# Patient Record
Sex: MALE | Race: WHITE | Employment: OTHER | URBAN - METROPOLITAN AREA
[De-identification: names, ages, dates, MRNs, and addresses within clinical notes are randomized per-mention and may not be internally consistent; named-entity substitution may affect disease eponyms.]

---

## 2017-01-10 ENCOUNTER — TELEPHONE (OUTPATIENT)
Dept: FAMILY MEDICINE CLINIC | Age: 52
End: 2017-01-10

## 2017-01-19 ENCOUNTER — OFFICE VISIT (OUTPATIENT)
Dept: FAMILY MEDICINE CLINIC | Age: 52
End: 2017-01-19

## 2017-01-19 VITALS
DIASTOLIC BLOOD PRESSURE: 66 MMHG | WEIGHT: 290 LBS | SYSTOLIC BLOOD PRESSURE: 118 MMHG | HEART RATE: 104 BPM | BODY MASS INDEX: 40.45 KG/M2

## 2017-01-19 DIAGNOSIS — N13.5 IDIOPATHIC RETROPERITONEAL FIBROSIS: Primary | ICD-10-CM

## 2017-01-19 DIAGNOSIS — F41.9 ANXIETY: ICD-10-CM

## 2017-01-19 DIAGNOSIS — R10.32 LEFT GROIN PAIN: ICD-10-CM

## 2017-01-19 DIAGNOSIS — H92.02 OTALGIA, LEFT: ICD-10-CM

## 2017-01-19 DIAGNOSIS — Z23 NEEDS FLU SHOT: ICD-10-CM

## 2017-01-19 PROCEDURE — G0008 ADMIN INFLUENZA VIRUS VAC: HCPCS | Performed by: FAMILY MEDICINE

## 2017-01-19 PROCEDURE — 99213 OFFICE O/P EST LOW 20 MIN: CPT | Performed by: FAMILY MEDICINE

## 2017-01-19 PROCEDURE — 90686 IIV4 VACC NO PRSV 0.5 ML IM: CPT | Performed by: FAMILY MEDICINE

## 2017-01-19 RX ORDER — OXYCODONE HCL 20 MG/1
40 TABLET, FILM COATED, EXTENDED RELEASE ORAL EVERY 12 HOURS
Qty: 120 TABLET | Refills: 0 | Status: SHIPPED | OUTPATIENT
Start: 2017-01-19 | End: 2017-02-17 | Stop reason: SDUPTHER

## 2017-01-19 RX ORDER — CLONAZEPAM 2 MG/1
1-2 TABLET ORAL 2 TIMES DAILY PRN
Qty: 60 TABLET | Refills: 0 | Status: SHIPPED | OUTPATIENT
Start: 2017-01-19 | End: 2017-02-17 | Stop reason: SDUPTHER

## 2017-01-19 RX ORDER — TRAMADOL HYDROCHLORIDE 50 MG/1
100 TABLET ORAL EVERY 6 HOURS PRN
Qty: 240 TABLET | Refills: 0 | Status: SHIPPED | OUTPATIENT
Start: 2017-01-19 | End: 2017-02-17 | Stop reason: SDUPTHER

## 2017-01-19 RX ORDER — OXYCODONE AND ACETAMINOPHEN 10; 325 MG/1; MG/1
1 TABLET ORAL EVERY 4 HOURS PRN
Qty: 30 TABLET | Refills: 0 | Status: SHIPPED | OUTPATIENT
Start: 2017-01-19 | End: 2017-04-17 | Stop reason: SDUPTHER

## 2017-01-19 RX ORDER — OXYCODONE HCL 40 MG/1
40 TABLET, FILM COATED, EXTENDED RELEASE ORAL EVERY 12 HOURS
Status: CANCELLED | OUTPATIENT
Start: 2017-01-19

## 2017-01-19 RX ORDER — OXYCODONE HCL 40 MG/1
40 TABLET, FILM COATED, EXTENDED RELEASE ORAL EVERY 12 HOURS
COMMUNITY
End: 2017-03-17 | Stop reason: DRUGHIGH

## 2017-01-19 RX ORDER — AMOXICILLIN 500 MG/1
500 CAPSULE ORAL 3 TIMES DAILY
Qty: 30 CAPSULE | Refills: 0 | Status: SHIPPED | OUTPATIENT
Start: 2017-01-19 | End: 2017-03-17 | Stop reason: SDUPTHER

## 2017-01-20 ENCOUNTER — TELEPHONE (OUTPATIENT)
Dept: FAMILY MEDICINE CLINIC | Age: 52
End: 2017-01-20

## 2017-01-20 ASSESSMENT — ENCOUNTER SYMPTOMS
ABDOMINAL PAIN: 1
CONSTIPATION: 1
SHORTNESS OF BREATH: 0

## 2017-02-17 ENCOUNTER — OFFICE VISIT (OUTPATIENT)
Dept: FAMILY MEDICINE CLINIC | Age: 52
End: 2017-02-17

## 2017-02-17 VITALS
BODY MASS INDEX: 40.6 KG/M2 | HEIGHT: 71 IN | WEIGHT: 290 LBS | SYSTOLIC BLOOD PRESSURE: 130 MMHG | DIASTOLIC BLOOD PRESSURE: 74 MMHG

## 2017-02-17 DIAGNOSIS — R10.32 LEFT GROIN PAIN: ICD-10-CM

## 2017-02-17 DIAGNOSIS — F41.9 ANXIETY: ICD-10-CM

## 2017-02-17 DIAGNOSIS — N13.5 IDIOPATHIC RETROPERITONEAL FIBROSIS: ICD-10-CM

## 2017-02-17 PROCEDURE — 99213 OFFICE O/P EST LOW 20 MIN: CPT | Performed by: FAMILY MEDICINE

## 2017-02-17 RX ORDER — CLONAZEPAM 2 MG/1
1-2 TABLET ORAL 2 TIMES DAILY PRN
Qty: 60 TABLET | Refills: 0 | Status: SHIPPED | OUTPATIENT
Start: 2017-02-17 | End: 2017-03-17 | Stop reason: SDUPTHER

## 2017-02-17 RX ORDER — TRAMADOL HYDROCHLORIDE 50 MG/1
100 TABLET ORAL EVERY 6 HOURS PRN
Qty: 240 TABLET | Refills: 0 | Status: SHIPPED | OUTPATIENT
Start: 2017-02-17 | End: 2017-03-17 | Stop reason: SDUPTHER

## 2017-02-17 RX ORDER — OXYCODONE HCL 20 MG/1
40 TABLET, FILM COATED, EXTENDED RELEASE ORAL EVERY 12 HOURS
Qty: 120 TABLET | Refills: 0 | Status: SHIPPED | OUTPATIENT
Start: 2017-02-17 | End: 2017-03-17 | Stop reason: SDUPTHER

## 2017-02-19 ASSESSMENT — ENCOUNTER SYMPTOMS
VOMITING: 0
CONSTIPATION: 1
ABDOMINAL PAIN: 1
SHORTNESS OF BREATH: 0

## 2017-03-17 ENCOUNTER — OFFICE VISIT (OUTPATIENT)
Dept: FAMILY MEDICINE CLINIC | Age: 52
End: 2017-03-17

## 2017-03-17 VITALS
SYSTOLIC BLOOD PRESSURE: 120 MMHG | DIASTOLIC BLOOD PRESSURE: 82 MMHG | WEIGHT: 277 LBS | HEART RATE: 92 BPM | BODY MASS INDEX: 38.63 KG/M2 | TEMPERATURE: 97.6 F

## 2017-03-17 DIAGNOSIS — R10.32 LEFT GROIN PAIN: Primary | ICD-10-CM

## 2017-03-17 DIAGNOSIS — N13.5 IDIOPATHIC RETROPERITONEAL FIBROSIS: ICD-10-CM

## 2017-03-17 DIAGNOSIS — H92.02 OTALGIA, LEFT: ICD-10-CM

## 2017-03-17 DIAGNOSIS — R10.33 PERIUMBILICAL PAIN: ICD-10-CM

## 2017-03-17 DIAGNOSIS — R11.0 NAUSEA: ICD-10-CM

## 2017-03-17 DIAGNOSIS — F41.9 ANXIETY: ICD-10-CM

## 2017-03-17 PROCEDURE — 99213 OFFICE O/P EST LOW 20 MIN: CPT | Performed by: FAMILY MEDICINE

## 2017-03-17 RX ORDER — AMOXICILLIN 500 MG/1
500 CAPSULE ORAL 3 TIMES DAILY
Qty: 30 CAPSULE | Refills: 0 | Status: SHIPPED | OUTPATIENT
Start: 2017-03-17 | End: 2017-03-27

## 2017-03-17 RX ORDER — CLONAZEPAM 2 MG/1
1-2 TABLET ORAL 2 TIMES DAILY PRN
Qty: 60 TABLET | Refills: 0 | Status: SHIPPED | OUTPATIENT
Start: 2017-03-17 | End: 2017-04-17 | Stop reason: SDUPTHER

## 2017-03-17 RX ORDER — TRAMADOL HYDROCHLORIDE 50 MG/1
100 TABLET ORAL EVERY 6 HOURS PRN
Qty: 240 TABLET | Refills: 0 | Status: SHIPPED | OUTPATIENT
Start: 2017-03-17 | End: 2017-04-17 | Stop reason: SDUPTHER

## 2017-03-17 RX ORDER — TIZANIDINE 4 MG/1
TABLET ORAL
Qty: 270 TABLET | Refills: 5 | Status: SHIPPED | OUTPATIENT
Start: 2017-03-17 | End: 2017-08-22 | Stop reason: SDUPTHER

## 2017-03-17 RX ORDER — OXYCODONE HCL 20 MG/1
40 TABLET, FILM COATED, EXTENDED RELEASE ORAL EVERY 12 HOURS
Qty: 120 TABLET | Refills: 0 | Status: SHIPPED | OUTPATIENT
Start: 2017-03-17 | End: 2017-04-17 | Stop reason: SDUPTHER

## 2017-03-18 ASSESSMENT — ENCOUNTER SYMPTOMS
DIARRHEA: 0
ABDOMINAL PAIN: 0
NAUSEA: 0
SHORTNESS OF BREATH: 0

## 2017-04-17 ENCOUNTER — OFFICE VISIT (OUTPATIENT)
Dept: FAMILY MEDICINE CLINIC | Age: 52
End: 2017-04-17

## 2017-04-17 VITALS
BODY MASS INDEX: 39.75 KG/M2 | DIASTOLIC BLOOD PRESSURE: 70 MMHG | HEART RATE: 100 BPM | SYSTOLIC BLOOD PRESSURE: 134 MMHG | WEIGHT: 285 LBS

## 2017-04-17 DIAGNOSIS — F17.200 TOBACCO USE DISORDER: ICD-10-CM

## 2017-04-17 DIAGNOSIS — R10.32 LEFT GROIN PAIN: Primary | ICD-10-CM

## 2017-04-17 DIAGNOSIS — N13.5 IDIOPATHIC RETROPERITONEAL FIBROSIS: ICD-10-CM

## 2017-04-17 DIAGNOSIS — F41.9 ANXIETY: ICD-10-CM

## 2017-04-17 PROCEDURE — 99213 OFFICE O/P EST LOW 20 MIN: CPT | Performed by: FAMILY MEDICINE

## 2017-04-17 RX ORDER — TRAMADOL HYDROCHLORIDE 50 MG/1
100 TABLET ORAL EVERY 6 HOURS PRN
Qty: 240 TABLET | Refills: 0 | Status: SHIPPED | OUTPATIENT
Start: 2017-04-17 | End: 2017-06-13 | Stop reason: SDUPTHER

## 2017-04-17 RX ORDER — OXYCODONE HCL 20 MG/1
40 TABLET, FILM COATED, EXTENDED RELEASE ORAL EVERY 12 HOURS
Qty: 120 TABLET | Refills: 0 | Status: SHIPPED | OUTPATIENT
Start: 2017-04-17 | End: 2017-08-10 | Stop reason: SDUPTHER

## 2017-04-17 RX ORDER — CLONAZEPAM 2 MG/1
1-2 TABLET ORAL 2 TIMES DAILY PRN
Qty: 60 TABLET | Refills: 0 | Status: SHIPPED | OUTPATIENT
Start: 2017-04-17 | End: 2017-05-16 | Stop reason: SDUPTHER

## 2017-04-18 ASSESSMENT — ENCOUNTER SYMPTOMS
ABDOMINAL PAIN: 1
SHORTNESS OF BREATH: 0

## 2017-05-16 ENCOUNTER — OFFICE VISIT (OUTPATIENT)
Dept: FAMILY MEDICINE CLINIC | Age: 52
End: 2017-05-16

## 2017-05-16 VITALS
BODY MASS INDEX: 39.33 KG/M2 | SYSTOLIC BLOOD PRESSURE: 134 MMHG | HEART RATE: 100 BPM | DIASTOLIC BLOOD PRESSURE: 78 MMHG | WEIGHT: 282 LBS

## 2017-05-16 DIAGNOSIS — F41.9 ANXIETY: ICD-10-CM

## 2017-05-16 DIAGNOSIS — F33.41 DEPRESSION, MAJOR, RECURRENT, IN PARTIAL REMISSION (HCC): ICD-10-CM

## 2017-05-16 DIAGNOSIS — N13.5 IDIOPATHIC RETROPERITONEAL FIBROSIS: Primary | ICD-10-CM

## 2017-05-16 DIAGNOSIS — R10.32 LEFT GROIN PAIN: ICD-10-CM

## 2017-05-16 PROCEDURE — 99213 OFFICE O/P EST LOW 20 MIN: CPT | Performed by: FAMILY MEDICINE

## 2017-05-16 RX ORDER — CLONAZEPAM 2 MG/1
1-2 TABLET ORAL 2 TIMES DAILY PRN
Qty: 60 TABLET | Refills: 0 | Status: SHIPPED | OUTPATIENT
Start: 2017-05-16 | End: 2017-06-13 | Stop reason: SDUPTHER

## 2017-05-16 RX ORDER — OXYCODONE AND ACETAMINOPHEN 10; 325 MG/1; MG/1
1 TABLET ORAL EVERY 4 HOURS PRN
Qty: 180 TABLET | Refills: 0 | Status: SHIPPED | OUTPATIENT
Start: 2017-05-16 | End: 2017-06-13 | Stop reason: SDUPTHER

## 2017-05-16 RX ORDER — TRAMADOL HYDROCHLORIDE 50 MG/1
100 TABLET ORAL EVERY 6 HOURS PRN
Qty: 240 TABLET | Refills: 0 | Status: CANCELLED | OUTPATIENT
Start: 2017-05-16

## 2017-06-12 ASSESSMENT — ENCOUNTER SYMPTOMS
SHORTNESS OF BREATH: 0
CONSTIPATION: 1
ABDOMINAL PAIN: 1

## 2017-06-13 ENCOUNTER — OFFICE VISIT (OUTPATIENT)
Dept: FAMILY MEDICINE CLINIC | Age: 52
End: 2017-06-13

## 2017-06-13 VITALS
HEIGHT: 71 IN | BODY MASS INDEX: 40.1 KG/M2 | WEIGHT: 286.4 LBS | SYSTOLIC BLOOD PRESSURE: 134 MMHG | HEART RATE: 80 BPM | DIASTOLIC BLOOD PRESSURE: 80 MMHG

## 2017-06-13 DIAGNOSIS — R10.32 LEFT GROIN PAIN: Primary | ICD-10-CM

## 2017-06-13 DIAGNOSIS — E03.9 HYPOTHYROIDISM, UNSPECIFIED TYPE: ICD-10-CM

## 2017-06-13 DIAGNOSIS — N13.5 IDIOPATHIC RETROPERITONEAL FIBROSIS: ICD-10-CM

## 2017-06-13 DIAGNOSIS — F41.9 ANXIETY: ICD-10-CM

## 2017-06-13 PROCEDURE — 99213 OFFICE O/P EST LOW 20 MIN: CPT | Performed by: FAMILY MEDICINE

## 2017-06-13 PROCEDURE — G0444 DEPRESSION SCREEN ANNUAL: HCPCS | Performed by: FAMILY MEDICINE

## 2017-06-13 RX ORDER — TRAMADOL HYDROCHLORIDE 50 MG/1
100 TABLET ORAL EVERY 6 HOURS PRN
Qty: 240 TABLET | Refills: 0 | Status: SHIPPED | OUTPATIENT
Start: 2017-06-13 | End: 2017-08-10 | Stop reason: SDUPTHER

## 2017-06-13 RX ORDER — OXYCODONE AND ACETAMINOPHEN 10; 325 MG/1; MG/1
1 TABLET ORAL EVERY 4 HOURS PRN
Qty: 180 TABLET | Refills: 0 | Status: SHIPPED | OUTPATIENT
Start: 2017-06-13 | End: 2017-09-21

## 2017-06-13 RX ORDER — LEVOTHYROXINE SODIUM 0.07 MG/1
TABLET ORAL
Qty: 30 TABLET | Refills: 5 | Status: SHIPPED | OUTPATIENT
Start: 2017-06-13 | End: 2017-09-21

## 2017-06-13 RX ORDER — CLONAZEPAM 2 MG/1
1-2 TABLET ORAL 2 TIMES DAILY PRN
Qty: 60 TABLET | Refills: 0 | Status: SHIPPED | OUTPATIENT
Start: 2017-06-13 | End: 2017-08-02 | Stop reason: SDUPTHER

## 2017-06-13 ASSESSMENT — PATIENT HEALTH QUESTIONNAIRE - PHQ9
1. LITTLE INTEREST OR PLEASURE IN DOING THINGS: 0
10. IF YOU CHECKED OFF ANY PROBLEMS, HOW DIFFICULT HAVE THESE PROBLEMS MADE IT FOR YOU TO DO YOUR WORK, TAKE CARE OF THINGS AT HOME, OR GET ALONG WITH OTHER PEOPLE: 0
9. THOUGHTS THAT YOU WOULD BE BETTER OFF DEAD, OR OF HURTING YOURSELF: 0
SUM OF ALL RESPONSES TO PHQ9 QUESTIONS 1 & 2: 3
SUM OF ALL RESPONSES TO PHQ QUESTIONS 1-9: 6
2. FEELING DOWN, DEPRESSED OR HOPELESS: 3
5. POOR APPETITE OR OVEREATING: 0
6. FEELING BAD ABOUT YOURSELF - OR THAT YOU ARE A FAILURE OR HAVE LET YOURSELF OR YOUR FAMILY DOWN: 0
8. MOVING OR SPEAKING SO SLOWLY THAT OTHER PEOPLE COULD HAVE NOTICED. OR THE OPPOSITE, BEING SO FIGETY OR RESTLESS THAT YOU HAVE BEEN MOVING AROUND A LOT MORE THAN USUAL: 0
7. TROUBLE CONCENTRATING ON THINGS, SUCH AS READING THE NEWSPAPER OR WATCHING TELEVISION: 0
4. FEELING TIRED OR HAVING LITTLE ENERGY: 3
3. TROUBLE FALLING OR STAYING ASLEEP: 0

## 2017-07-09 ASSESSMENT — ENCOUNTER SYMPTOMS
SHORTNESS OF BREATH: 0
ABDOMINAL PAIN: 1

## 2017-07-13 ENCOUNTER — OFFICE VISIT (OUTPATIENT)
Dept: FAMILY MEDICINE CLINIC | Age: 52
End: 2017-07-13

## 2017-07-13 DIAGNOSIS — F17.200 TOBACCO USE DISORDER: ICD-10-CM

## 2017-07-13 DIAGNOSIS — N13.5 IDIOPATHIC RETROPERITONEAL FIBROSIS: Primary | ICD-10-CM

## 2017-07-13 DIAGNOSIS — R10.32 LEFT GROIN PAIN: ICD-10-CM

## 2017-07-13 PROCEDURE — 99213 OFFICE O/P EST LOW 20 MIN: CPT | Performed by: FAMILY MEDICINE

## 2017-07-14 VITALS
HEART RATE: 90 BPM | SYSTOLIC BLOOD PRESSURE: 128 MMHG | BODY MASS INDEX: 40.03 KG/M2 | DIASTOLIC BLOOD PRESSURE: 78 MMHG | WEIGHT: 287 LBS

## 2017-07-14 ASSESSMENT — ENCOUNTER SYMPTOMS
SHORTNESS OF BREATH: 0
ABDOMINAL PAIN: 1

## 2017-08-02 ENCOUNTER — TELEPHONE (OUTPATIENT)
Dept: FAMILY MEDICINE CLINIC | Age: 52
End: 2017-08-02

## 2017-08-02 DIAGNOSIS — R10.32 LEFT GROIN PAIN: ICD-10-CM

## 2017-08-02 DIAGNOSIS — F41.9 ANXIETY: ICD-10-CM

## 2017-08-02 DIAGNOSIS — N13.5 IDIOPATHIC RETROPERITONEAL FIBROSIS: ICD-10-CM

## 2017-08-02 RX ORDER — OXYCODONE AND ACETAMINOPHEN 10; 325 MG/1; MG/1
1 TABLET ORAL EVERY 4 HOURS PRN
Qty: 180 TABLET | Refills: 0 | Status: CANCELLED | OUTPATIENT
Start: 2017-08-02

## 2017-08-02 NOTE — TELEPHONE ENCOUNTER
Edwardo Self is requesting refill(s) Percocet  Last OV 07-13-17 (pertaining to medication)  LR 06-13-17 (per medication requested)  Next office visit scheduled or attempted Yes   If no, reason:  08-22-17    Edwardo Self is requesting refill(s) clonazepam  Last OV 07-13-17 (pertaining to medication)  LR 06-13-17 (per medication requested)  Next office visit scheduled or attempted Yes   If no, reason:  08-22-17    Please call when ready.

## 2017-08-10 DIAGNOSIS — R10.32 LEFT GROIN PAIN: ICD-10-CM

## 2017-08-10 DIAGNOSIS — N13.5 IDIOPATHIC RETROPERITONEAL FIBROSIS: ICD-10-CM

## 2017-08-10 RX ORDER — TRAMADOL HYDROCHLORIDE 50 MG/1
100 TABLET ORAL EVERY 6 HOURS PRN
Qty: 240 TABLET | Refills: 0 | Status: SHIPPED | OUTPATIENT
Start: 2017-08-10 | End: 2017-09-21 | Stop reason: SDUPTHER

## 2017-08-10 RX ORDER — OXYCODONE HCL 20 MG/1
40 TABLET, FILM COATED, EXTENDED RELEASE ORAL EVERY 12 HOURS
Qty: 120 TABLET | Refills: 0 | Status: SHIPPED | OUTPATIENT
Start: 2017-08-10 | End: 2017-08-22 | Stop reason: SDUPTHER

## 2017-08-10 RX ORDER — TRAMADOL HYDROCHLORIDE 50 MG/1
100 TABLET ORAL EVERY 6 HOURS PRN
Qty: 120 TABLET | Refills: 0 | Status: SHIPPED | OUTPATIENT
Start: 2017-08-10 | End: 2017-08-10 | Stop reason: SDUPTHER

## 2017-08-10 RX ORDER — CLONAZEPAM 2 MG/1
1-2 TABLET ORAL 2 TIMES DAILY PRN
Qty: 60 TABLET | Refills: 0 | Status: SHIPPED | OUTPATIENT
Start: 2017-08-10 | End: 2017-08-22 | Stop reason: SDUPTHER

## 2017-08-22 ENCOUNTER — OFFICE VISIT (OUTPATIENT)
Dept: FAMILY MEDICINE CLINIC | Age: 52
End: 2017-08-22

## 2017-08-22 VITALS
HEART RATE: 100 BPM | DIASTOLIC BLOOD PRESSURE: 60 MMHG | WEIGHT: 295 LBS | SYSTOLIC BLOOD PRESSURE: 118 MMHG | BODY MASS INDEX: 41.14 KG/M2

## 2017-08-22 DIAGNOSIS — H92.12 EAR DRAINAGE, LEFT: ICD-10-CM

## 2017-08-22 DIAGNOSIS — F17.200 TOBACCO USE DISORDER: ICD-10-CM

## 2017-08-22 DIAGNOSIS — N13.5 IDIOPATHIC RETROPERITONEAL FIBROSIS: ICD-10-CM

## 2017-08-22 DIAGNOSIS — F41.9 ANXIETY: ICD-10-CM

## 2017-08-22 DIAGNOSIS — R10.32 LEFT GROIN PAIN: Primary | ICD-10-CM

## 2017-08-22 PROCEDURE — 99213 OFFICE O/P EST LOW 20 MIN: CPT | Performed by: FAMILY MEDICINE

## 2017-08-22 RX ORDER — OXYCODONE HCL 20 MG/1
40 TABLET, FILM COATED, EXTENDED RELEASE ORAL EVERY 12 HOURS
Qty: 120 TABLET | Refills: 0 | Status: SHIPPED | OUTPATIENT
Start: 2017-08-22 | End: 2017-09-21 | Stop reason: SDUPTHER

## 2017-08-22 RX ORDER — TIZANIDINE 4 MG/1
TABLET ORAL
Qty: 270 TABLET | Refills: 5 | Status: SHIPPED | OUTPATIENT
Start: 2017-08-22 | End: 2018-01-25 | Stop reason: SDUPTHER

## 2017-08-22 RX ORDER — OFLOXACIN 3 MG/ML
4 SOLUTION AURICULAR (OTIC) 3 TIMES DAILY
Qty: 10 ML | Refills: 0 | Status: SHIPPED | OUTPATIENT
Start: 2017-08-22 | End: 2018-04-27 | Stop reason: SDUPTHER

## 2017-08-22 RX ORDER — CLONAZEPAM 2 MG/1
1-2 TABLET ORAL 2 TIMES DAILY PRN
Qty: 60 TABLET | Refills: 0 | Status: SHIPPED | OUTPATIENT
Start: 2017-08-22 | End: 2017-09-21 | Stop reason: SDUPTHER

## 2017-08-24 ASSESSMENT — ENCOUNTER SYMPTOMS
SHORTNESS OF BREATH: 0
ABDOMINAL PAIN: 1
CONSTIPATION: 1

## 2017-09-21 ENCOUNTER — OFFICE VISIT (OUTPATIENT)
Dept: FAMILY MEDICINE CLINIC | Age: 52
End: 2017-09-21

## 2017-09-21 VITALS
SYSTOLIC BLOOD PRESSURE: 128 MMHG | HEART RATE: 96 BPM | WEIGHT: 292 LBS | DIASTOLIC BLOOD PRESSURE: 70 MMHG | BODY MASS INDEX: 40.73 KG/M2

## 2017-09-21 DIAGNOSIS — F41.9 ANXIETY: ICD-10-CM

## 2017-09-21 DIAGNOSIS — R79.89 ELEVATED TSH: ICD-10-CM

## 2017-09-21 DIAGNOSIS — F33.41 DEPRESSION, MAJOR, RECURRENT, IN PARTIAL REMISSION (HCC): ICD-10-CM

## 2017-09-21 DIAGNOSIS — Z13.220 LIPID SCREENING: ICD-10-CM

## 2017-09-21 DIAGNOSIS — R10.32 LEFT GROIN PAIN: ICD-10-CM

## 2017-09-21 DIAGNOSIS — Z51.81 MEDICATION MONITORING ENCOUNTER: ICD-10-CM

## 2017-09-21 DIAGNOSIS — Z23 NEED FOR INFLUENZA VACCINATION: ICD-10-CM

## 2017-09-21 DIAGNOSIS — N13.5 IDIOPATHIC RETROPERITONEAL FIBROSIS: Primary | ICD-10-CM

## 2017-09-21 LAB
ANION GAP SERPL CALCULATED.3IONS-SCNC: 18 MMOL/L (ref 3–16)
BUN BLDV-MCNC: 11 MG/DL (ref 7–20)
CALCIUM SERPL-MCNC: 9.6 MG/DL (ref 8.3–10.6)
CHLORIDE BLD-SCNC: 96 MMOL/L (ref 99–110)
CHOLESTEROL, TOTAL: 197 MG/DL (ref 0–199)
CO2: 27 MMOL/L (ref 21–32)
CREAT SERPL-MCNC: 1.3 MG/DL (ref 0.9–1.3)
GFR AFRICAN AMERICAN: >60
GFR NON-AFRICAN AMERICAN: 58
GLUCOSE BLD-MCNC: 124 MG/DL (ref 70–99)
HDLC SERPL-MCNC: 35 MG/DL (ref 40–60)
LDL CHOLESTEROL CALCULATED: 128 MG/DL
POTASSIUM SERPL-SCNC: 4.2 MMOL/L (ref 3.5–5.1)
SODIUM BLD-SCNC: 141 MMOL/L (ref 136–145)
TRIGL SERPL-MCNC: 171 MG/DL (ref 0–150)
VLDLC SERPL CALC-MCNC: 34 MG/DL

## 2017-09-21 PROCEDURE — 90686 IIV4 VACC NO PRSV 0.5 ML IM: CPT | Performed by: FAMILY MEDICINE

## 2017-09-21 PROCEDURE — G0008 ADMIN INFLUENZA VIRUS VAC: HCPCS | Performed by: FAMILY MEDICINE

## 2017-09-21 PROCEDURE — 99214 OFFICE O/P EST MOD 30 MIN: CPT | Performed by: FAMILY MEDICINE

## 2017-09-21 PROCEDURE — 36415 COLL VENOUS BLD VENIPUNCTURE: CPT | Performed by: FAMILY MEDICINE

## 2017-09-21 RX ORDER — OXYCODONE HCL 20 MG/1
TABLET, FILM COATED, EXTENDED RELEASE ORAL
Qty: 150 TABLET | Refills: 0 | Status: SHIPPED | OUTPATIENT
Start: 2017-09-21 | End: 2018-01-25 | Stop reason: SDUPTHER

## 2017-09-21 RX ORDER — TRAMADOL HYDROCHLORIDE 50 MG/1
100 TABLET ORAL EVERY 6 HOURS PRN
Qty: 240 TABLET | Refills: 0 | Status: SHIPPED | OUTPATIENT
Start: 2017-09-21 | End: 2017-11-28 | Stop reason: SDUPTHER

## 2017-09-21 RX ORDER — QUETIAPINE FUMARATE 400 MG/1
400 TABLET, FILM COATED ORAL 2 TIMES DAILY
Qty: 60 TABLET | Refills: 5 | Status: SHIPPED | OUTPATIENT
Start: 2017-09-21 | End: 2018-04-27 | Stop reason: SDUPTHER

## 2017-09-21 RX ORDER — CLONAZEPAM 2 MG/1
1-2 TABLET ORAL 2 TIMES DAILY PRN
Qty: 60 TABLET | Refills: 0 | Status: SHIPPED | OUTPATIENT
Start: 2017-09-21 | End: 2017-10-26 | Stop reason: SDUPTHER

## 2017-09-21 NOTE — PROGRESS NOTES
Subjective:      Patient ID: Joseph Jordan is a 47 y.o. male. HPI   FU for IRF/left groin pain. Found out 2 days ago house is being sold, so has to move out within the next month, plans to go to an apt (?). No issues with his son, no pills stolen. Is ok to go forward with blood tests (regarding kidney filtering/thyroid tests/repeat CT scan). No other concerns today. Is wanting to go up with the oxycodone dose - he's wanting to go up to 6/day (and told him I am ok for 5/day for one month only, then back to 4/day). Still smoking (cigarettes), no plans to quit in the near future. Based on a Morphine Equivalent Dose of 80 or higher, the following issues were addressed:    Treatment Goal:  Minimize pain    Progress Toward Goal: no    Patient Satisfaction with Analgesia:  fair  Medication Side Effects: constipation    Functional Status:       Overall: Moderately impaired     ADLs:   - Eating:  Independent    - Bathing:  Independent   - Grooming:  Independent   - Dressing:  Independent   - Using the toilet:  Independent   - Walking:  Independent       Potential Aberrant Drug-Related Behavior:  None     Controlled Substances Monitoring:   OARRS     Date Narcotic Agreement Signed:  2013   Date of Last Urine Drug Screenin16     Interpretation:  Acceptable        Review of Systems   Constitutional: Positive for fatigue. Negative for chills. Gastrointestinal: Positive for abdominal pain (left groin primarily, no recent major changes). Psychiatric/Behavioral: Positive for dysphoric mood (mild). Negative for self-injury and suicidal ideas. The patient is nervous/anxious (mild). Objective:   Physical Exam   Constitutional: He appears well-developed and well-nourished. Cardiovascular: Normal rate, regular rhythm and normal heart sounds. Pulmonary/Chest: Effort normal and breath sounds normal. No respiratory distress.    Abdominal: There is tenderness (definite TTP at/near left mid groin area).   Neurological: He is alert. Psychiatric: His speech is normal and behavior is normal. His mood appears anxious (slight). He exhibits a depressed mood (mild). Nursing note and vitals reviewed. Assessment:      Encounter Diagnoses   Name Primary?  Idiopathic retroperitoneal fibrosis Yes    Left groin pain     Elevated TSH     Anxiety     Depression, major, recurrent, in partial remission (HCC)     Lipid screening     Need for influenza vaccination     Medication monitoring encounter          Plan:      Per orders - await results. Idiopathic retroperitoneal fibrosis and associated pain appears overall stable, but with increased activity, some apparent increased pain lately. As above, I agreed to ONE-TIME Rx of [id number] up to 5/24 hours, #150 (instead of two twice daily, #120), due to moving/additional activity this month, then to go back to two twice daily, #120/month. Ordered CT scan as above, to r.o progression of the IRF, especially since involving left ureter so some extent. If all stable, then followup office visit in 4 weeks, sooner prn. Length of visit over 25 minutes, and >50% of time spent counseling and coordinating care.

## 2017-09-21 NOTE — PROGRESS NOTES
Vaccine Information Sheet, \"Influenza - Inactivated\"  given to Evins Pro, or parent/legal guardian of  Evins Pro and verbalized understanding. Patient responses:    Have you ever had a reaction to a flu vaccine? No  Are you able to eat eggs without adverse effects? Yes  Do you have any current illness? No  Have you ever had Guillian [place] Syndrome? No    Flu vaccine given per order. Please see immunization tab.

## 2017-09-22 LAB
T4 FREE: 0.8 NG/DL (ref 0.9–1.8)
TSH SERPL DL<=0.05 MIU/L-ACNC: 7.08 UIU/ML (ref 0.27–4.2)

## 2017-10-02 ASSESSMENT — ENCOUNTER SYMPTOMS: ABDOMINAL PAIN: 1

## 2017-10-02 NOTE — PATIENT INSTRUCTIONS
Continue present medications, though I am agreeable to one month of high-dose oxycodone as prescribed/as discussed. Following month will be back to quantity 120 tablets instead of the quantity 150. Get abdominal/pelvic CAT scan as ordered/as requested, to help ensure stability of the idiopathic retroperitoneal fibrosis. If all stable, then follow-up office visit in 4 weeks, sooner as needed.

## 2017-10-11 ENCOUNTER — TELEPHONE (OUTPATIENT)
Dept: FAMILY MEDICINE CLINIC | Age: 52
End: 2017-10-11

## 2017-10-11 NOTE — TELEPHONE ENCOUNTER
Patient is calling to see if someone can call [facility] to clarify the amount of refills he should have on his tiZANidine (ZANAFLEX) 4 MG tablet. They are saying he has no refills and it shows that we called in 5 refills in August 2017.   Please call the patient with questions at [phone]    Thank you

## 2017-10-26 ENCOUNTER — OFFICE VISIT (OUTPATIENT)
Dept: FAMILY MEDICINE CLINIC | Age: 52
End: 2017-10-26

## 2017-10-26 VITALS
WEIGHT: 292 LBS | DIASTOLIC BLOOD PRESSURE: 74 MMHG | HEART RATE: 84 BPM | SYSTOLIC BLOOD PRESSURE: 130 MMHG | BODY MASS INDEX: 40.73 KG/M2

## 2017-10-26 DIAGNOSIS — R10.32 LEFT GROIN PAIN: ICD-10-CM

## 2017-10-26 DIAGNOSIS — F41.9 ANXIETY: ICD-10-CM

## 2017-10-26 DIAGNOSIS — N13.5 IDIOPATHIC RETROPERITONEAL FIBROSIS: Primary | ICD-10-CM

## 2017-10-26 DIAGNOSIS — F33.41 DEPRESSION, MAJOR, RECURRENT, IN PARTIAL REMISSION (HCC): ICD-10-CM

## 2017-10-26 PROCEDURE — 99213 OFFICE O/P EST LOW 20 MIN: CPT | Performed by: FAMILY MEDICINE

## 2017-10-26 RX ORDER — OXYCODONE AND ACETAMINOPHEN 10; 325 MG/1; MG/1
1 TABLET ORAL EVERY 4 HOURS PRN
Qty: 180 TABLET | Refills: 0 | Status: SHIPPED | OUTPATIENT
Start: 2017-10-26 | End: 2018-02-22 | Stop reason: ALTCHOICE

## 2017-10-26 RX ORDER — CLONAZEPAM 2 MG/1
1-2 TABLET ORAL 2 TIMES DAILY PRN
Qty: 60 TABLET | Refills: 0 | Status: SHIPPED | OUTPATIENT
Start: 2017-10-26 | End: 2017-11-28 | Stop reason: SDUPTHER

## 2017-10-26 RX ORDER — TRAMADOL HYDROCHLORIDE 50 MG/1
100 TABLET ORAL EVERY 6 HOURS PRN
Qty: 120 TABLET | Refills: 0 | Status: SHIPPED | OUTPATIENT
Start: 2017-10-26 | End: 2018-01-25 | Stop reason: SDUPTHER

## 2017-10-26 RX ORDER — OXYCODONE AND ACETAMINOPHEN 10; 325 MG/1; MG/1
1 TABLET ORAL EVERY 4 HOURS PRN
Status: CANCELLED | OUTPATIENT
Start: 2017-10-26 | End: 2017-11-02

## 2017-10-26 NOTE — PROGRESS NOTES
partial remission ([facility] [address] Yes    Anxiety     Idiopathic retroperitoneal fibrosis     Left groin pain            Plan:       Idiopathic retroperitoneal fibrosis probably stable, and depression/anxiety overall appears stable also. Due to cost, patient requested to be switched back to Percocet, and told him I and not wanting to go above 6 Percocet 10/325 spring day. Also mention to patient that I advised he follow up with [facility] pain doctor, partially see if they can provide better pain control with less narcotics. Will discuss again within the next month or 2. Repeat office visit in one month, sooner as needed.

## 2017-10-27 ASSESSMENT — ENCOUNTER SYMPTOMS
BACK PAIN: 0
COUGH: 0
CONSTIPATION: 1
ABDOMINAL PAIN: 1
SHORTNESS OF BREATH: 0

## 2017-11-28 ENCOUNTER — OFFICE VISIT (OUTPATIENT)
Dept: FAMILY MEDICINE CLINIC | Age: 52
End: 2017-11-28

## 2017-11-28 VITALS
BODY MASS INDEX: 41 KG/M2 | HEART RATE: 88 BPM | SYSTOLIC BLOOD PRESSURE: 116 MMHG | WEIGHT: 294 LBS | DIASTOLIC BLOOD PRESSURE: 74 MMHG

## 2017-11-28 DIAGNOSIS — F41.9 ANXIETY: Primary | ICD-10-CM

## 2017-11-28 DIAGNOSIS — R10.32 LEFT GROIN PAIN: ICD-10-CM

## 2017-11-28 DIAGNOSIS — N13.5 IDIOPATHIC RETROPERITONEAL FIBROSIS: ICD-10-CM

## 2017-11-28 DIAGNOSIS — Z79.899 LONG TERM USE OF DRUG: ICD-10-CM

## 2017-11-28 PROCEDURE — 99213 OFFICE O/P EST LOW 20 MIN: CPT | Performed by: FAMILY MEDICINE

## 2017-11-28 RX ORDER — CLONAZEPAM 2 MG/1
1-2 TABLET ORAL 2 TIMES DAILY PRN
Qty: 60 TABLET | Refills: 0 | Status: SHIPPED | OUTPATIENT
Start: 2017-11-28 | End: 2017-12-28 | Stop reason: SDUPTHER

## 2017-11-28 RX ORDER — OXYCODONE HCL 20 MG/1
TABLET, FILM COATED, EXTENDED RELEASE ORAL
Qty: 150 TABLET | Refills: 0 | Status: CANCELLED | OUTPATIENT
Start: 2017-11-28

## 2017-11-28 RX ORDER — OXYCODONE HYDROCHLORIDE 20 MG/1
20 TABLET ORAL EVERY 6 HOURS PRN
Qty: 120 TABLET | Refills: 0 | Status: SHIPPED | OUTPATIENT
Start: 2017-11-28 | End: 2017-12-28 | Stop reason: SDUPTHER

## 2017-11-28 NOTE — PROGRESS NOTES
Subjective:      Patient ID: Devan Zuniga is a 47 y.o. male. HPI     Fu for chronic left groin pain/anxiety/depression. Limited money . Wants to switch from OxyContin to IM oxycodon. Son still not working, not sure why not. Still 1 PPD, work ok presently. No other concerns. Based on a Morphine Equivalent Dose of 80 or higher, the following issues were addressed:     Treatment Goal:  Minimize pain    Progress Toward Goal: no    Patient Satisfaction with Analgesia:  fair  Medication Side Effects: constipation    Functional Status:       Overall: Moderately impaired     ADLs:   - Eating:  Independent    - Bathing:  Independent   - Grooming:  Independent   - Dressing:  Independent   - Using the toilet:  Independent   - Walking:  Independent       Potential Aberrant Drug-Related Behavior: None     Controlled Substances Monitoring:   OARRS     Date Narcotic Agreement Signed:  2013   Date of Last Urine Drug Screenin2016     Interpretation:  Acceptable        Review of Systems   Constitutional: Positive for fatigue. Negative for chills and fever. Respiratory: Negative for shortness of breath. Cardiovascular: Negative for chest pain. Gastrointestinal: Positive for abdominal pain (L groin area, unchanged). Objective:   Physical Exam   Constitutional: He appears well-developed and well-nourished. Cardiovascular: Normal rate, regular rhythm and normal heart sounds. Pulmonary/Chest: Effort normal and breath sounds normal. No respiratory distress. Neurological: He is alert. Nursing note and vitals reviewed. Assessment:      Encounter Diagnoses   Name Primary?  Anxiety     Left groin pain     Idiopathic retroperitoneal fibrosis     Left groin pain     Long term use of drug Yes           Plan:      Per orders - await results. Consider followup appt with pain doc/clinic, Dr. Gudelia Yo, especially since seems like pain is worsening. Get urine drug screen.   FU 4 weeks,

## 2017-12-02 LAB
6-ACETYLMORPHINE: NOT DETECTED
7-AMINOCLONAZEPAM: PRESENT
ALPHA-OH-ALPRAZOLAM: NOT DETECTED
ALPRAZOLAM: NOT DETECTED
AMPHETAMINE: NOT DETECTED
BARBITURATES: NOT DETECTED
BENZOYLECGONINE: NOT DETECTED
BUPRENORPHINE: NOT DETECTED
CARISOPRODOL: NOT DETECTED
CLONAZEPAM: PRESENT
CODEINE: NOT DETECTED
CREATININE URINE: 137 MG/DL (ref 20–400)
DIAZEPAM: NOT DETECTED
DRUGS EXPECTED: NORMAL
EER PAIN MGT DRUG PANEL, HIGH RES/EMIT U: NORMAL
ETHYL GLUCURONIDE: NOT DETECTED
FENTANYL: NOT DETECTED
HYDROCODONE: NOT DETECTED
HYDROMORPHONE: NOT DETECTED
LORAZEPAM: NOT DETECTED
MARIJUANA METABOLITE: NOT DETECTED
MDA: NOT DETECTED
MDEA: NOT DETECTED
MDMA URINE: NOT DETECTED
MEPERIDINE: NOT DETECTED
METHADONE: NOT DETECTED
METHAMPHETAMINE: NOT DETECTED
METHYLPHENIDATE: NOT DETECTED
MIDAZOLAM: NOT DETECTED
MORPHINE: NOT DETECTED
NORBUPRENORPHINE, FREE: NOT DETECTED
NORDIAZEPAM: NOT DETECTED
NORFENTANYL: NOT DETECTED
NORHYDROCODONE, URINE: NOT DETECTED
NOROXYCODONE: PRESENT
NOROXYMORPHONE, URINE: PRESENT
OXAZEPAM: NOT DETECTED
OXYCODONE: PRESENT
OXYMORPHONE: PRESENT
PAIN MANAGEMENT DRUG PANEL: NORMAL
PAIN MANAGEMENT DRUG PANEL: NORMAL
PCP: NOT DETECTED
PHENTERMINE: NOT DETECTED
PROPOXYPHENE: NOT DETECTED
TAPENTADOL, URINE: NOT DETECTED
TAPENTADOL-O-SULFATE, URINE: NOT DETECTED
TEMAZEPAM: NOT DETECTED
TRAMADOL: NOT DETECTED
ZOLPIDEM: NOT DETECTED

## 2017-12-18 ASSESSMENT — ENCOUNTER SYMPTOMS
ABDOMINAL PAIN: 1
SHORTNESS OF BREATH: 0

## 2017-12-18 NOTE — PATIENT INSTRUCTIONS
Continue present medications, overall appears stable. Consider followup with pain doctor, Dr. Alan Blake, as discussed/as referred. Repeat office visit in 4 weeks, sooner as needed.

## 2017-12-28 ENCOUNTER — OFFICE VISIT (OUTPATIENT)
Dept: FAMILY MEDICINE CLINIC | Age: 52
End: 2017-12-28

## 2017-12-28 VITALS
DIASTOLIC BLOOD PRESSURE: 80 MMHG | BODY MASS INDEX: 41.16 KG/M2 | HEIGHT: 71 IN | WEIGHT: 294 LBS | SYSTOLIC BLOOD PRESSURE: 112 MMHG

## 2017-12-28 DIAGNOSIS — F41.9 ANXIETY: ICD-10-CM

## 2017-12-28 DIAGNOSIS — D72.829 LEUKOCYTOSIS, UNSPECIFIED TYPE: ICD-10-CM

## 2017-12-28 DIAGNOSIS — N13.5 IDIOPATHIC RETROPERITONEAL FIBROSIS: Primary | ICD-10-CM

## 2017-12-28 DIAGNOSIS — R10.32 LEFT GROIN PAIN: ICD-10-CM

## 2017-12-28 DIAGNOSIS — R79.89 ELEVATED SERUM CREATININE: ICD-10-CM

## 2017-12-28 DIAGNOSIS — E55.9 VITAMIN D DEFICIENCY: ICD-10-CM

## 2017-12-28 DIAGNOSIS — R79.89 ABNORMAL LFTS: ICD-10-CM

## 2017-12-28 DIAGNOSIS — R79.89 ELEVATED TSH: ICD-10-CM

## 2017-12-28 PROCEDURE — 99213 OFFICE O/P EST LOW 20 MIN: CPT | Performed by: FAMILY MEDICINE

## 2017-12-28 RX ORDER — CLONAZEPAM 2 MG/1
1-2 TABLET ORAL 2 TIMES DAILY PRN
Qty: 60 TABLET | Refills: 0 | Status: SHIPPED | OUTPATIENT
Start: 2017-12-28 | End: 2018-01-25 | Stop reason: SDUPTHER

## 2017-12-28 RX ORDER — OXYCODONE HYDROCHLORIDE 20 MG/1
20 TABLET ORAL EVERY 6 HOURS PRN
Qty: 120 TABLET | Refills: 0 | Status: SHIPPED | OUTPATIENT
Start: 2017-12-28 | End: 2018-03-23 | Stop reason: SDUPTHER

## 2017-12-28 NOTE — PROGRESS NOTES
Subjective:      Patient ID: Freddie Joseph is a 47 y.o. male. HPI   FU for anxiety/IPF/L groin pain. Son in rehab through [facility], inpatient, for 3-6 months, and then might have to go to [facility] for child support (failing to pay). Pain control not as good as when on Oxycontin at same dose. ANxiety fair - states feels lost, like has no life - contracts for safety. Still 1 PPD. Still wanting to get dentures, not in near future. (OARRS was run and reviewed personally by me, Yesenia Hayward, on 17, and no signs or suspicion of diversion or other abuse.)  Pt asking to increase dose of the oxycodone immediate release, since states doesn't have as much pain relief as the same number of mg's/day as the Oxycontin (and told him I do NOT feel comfortable to do this). Some pain at R groin this past month too, present maybe 40% of time - monitor. [OK with patient to draw blood next month!]      The 10-year ASCVD risk score (Francisca Prado, et al., 2013) is: 10.1%    Values used to calculate the score:      Age: 47 years      Sex: Male      Is Non- : No      Diabetic: No      Tobacco smoker: Yes      Systolic Blood Pressure: 227 mmHg      Is BP treated: No      HDL Cholesterol: 35 mg/dL      Total Cholesterol: 197 mg/dL    Based on a Morphine Equivalent Dose of 80 or higher, the following issues were addressed:    Treatment Goal:  Minimize pain    Progress Toward Goal: no    Patient Satisfaction with Analgesia:  fair  Medication Side Effects: none    Functional Status:       Overall:   Moderately impaired     ADLs:   - Eating:  Independent    - Bathing:  Independent   - Grooming:  Independent   - Dressing:  Independent   - Using the toilet:  Independent   - Walking:  Independent       Potential Aberrant Drug-Related Behavior:  None     Controlled Substances Monitoring:   OARRS (last check today, 17)     Date Narcotic Agreement Signed:  2013   Date of Last Urine Drug Screenin2016

## 2017-12-29 ASSESSMENT — ENCOUNTER SYMPTOMS
ABDOMINAL PAIN: 1
SHORTNESS OF BREATH: 0
CONSTIPATION: 1

## 2017-12-29 NOTE — PATIENT INSTRUCTIONS
Idiopathic retroperitoneal fibrosis/left groin pain essentially stable, little change. Continue present medications. Labs ordered for repeat office visit in approximate 4 weeks, sooner as needed. Stop smoking when ready.

## 2018-01-25 ENCOUNTER — OFFICE VISIT (OUTPATIENT)
Dept: FAMILY MEDICINE CLINIC | Age: 53
End: 2018-01-25

## 2018-01-25 VITALS
HEART RATE: 100 BPM | WEIGHT: 297.6 LBS | DIASTOLIC BLOOD PRESSURE: 70 MMHG | BODY MASS INDEX: 41.51 KG/M2 | SYSTOLIC BLOOD PRESSURE: 136 MMHG

## 2018-01-25 DIAGNOSIS — E55.9 VITAMIN D DEFICIENCY: ICD-10-CM

## 2018-01-25 DIAGNOSIS — R73.09 ELEVATED GLUCOSE: ICD-10-CM

## 2018-01-25 DIAGNOSIS — R79.89 ELEVATED TSH: ICD-10-CM

## 2018-01-25 DIAGNOSIS — N13.5 IDIOPATHIC RETROPERITONEAL FIBROSIS: Primary | ICD-10-CM

## 2018-01-25 DIAGNOSIS — E78.2 MIXED HYPERLIPIDEMIA: ICD-10-CM

## 2018-01-25 DIAGNOSIS — F41.9 ANXIETY: ICD-10-CM

## 2018-01-25 DIAGNOSIS — R94.4 DECREASED GLOMERULAR FILTRATION RATE (GFR): ICD-10-CM

## 2018-01-25 DIAGNOSIS — D72.829 LEUKOCYTOSIS, UNSPECIFIED TYPE: ICD-10-CM

## 2018-01-25 DIAGNOSIS — R10.32 LEFT GROIN PAIN: ICD-10-CM

## 2018-01-25 LAB
A/G RATIO: 1.2 (ref 1.1–2.2)
ALBUMIN SERPL-MCNC: 4.4 G/DL (ref 3.4–5)
ALP BLD-CCNC: 115 U/L (ref 40–129)
ALT SERPL-CCNC: 20 U/L (ref 10–40)
ANION GAP SERPL CALCULATED.3IONS-SCNC: 13 MMOL/L (ref 3–16)
AST SERPL-CCNC: 18 U/L (ref 15–37)
BASOPHILS ABSOLUTE: 0.1 K/UL (ref 0–0.2)
BASOPHILS RELATIVE PERCENT: 1.1 %
BILIRUB SERPL-MCNC: <0.2 MG/DL (ref 0–1)
BUN BLDV-MCNC: 13 MG/DL (ref 7–20)
CALCIUM SERPL-MCNC: 9.7 MG/DL (ref 8.3–10.6)
CHLORIDE BLD-SCNC: 98 MMOL/L (ref 99–110)
CHOLESTEROL, TOTAL: 190 MG/DL (ref 0–199)
CO2: 29 MMOL/L (ref 21–32)
CREAT SERPL-MCNC: 1 MG/DL (ref 0.9–1.3)
EOSINOPHILS ABSOLUTE: 0.2 K/UL (ref 0–0.6)
EOSINOPHILS RELATIVE PERCENT: 2.5 %
GFR AFRICAN AMERICAN: >60
GFR NON-AFRICAN AMERICAN: >60
GLOBULIN: 3.6 G/DL
GLUCOSE BLD-MCNC: 106 MG/DL (ref 70–99)
HCT VFR BLD CALC: 48.3 % (ref 40.5–52.5)
HDLC SERPL-MCNC: 33 MG/DL (ref 40–60)
HEMOGLOBIN: 16 G/DL (ref 13.5–17.5)
LDL CHOLESTEROL CALCULATED: ABNORMAL MG/DL
LDL CHOLESTEROL DIRECT: 101 MG/DL
LYMPHOCYTES ABSOLUTE: 1.7 K/UL (ref 1–5.1)
LYMPHOCYTES RELATIVE PERCENT: 17.8 %
MCH RBC QN AUTO: 28.9 PG (ref 26–34)
MCHC RBC AUTO-ENTMCNC: 33 G/DL (ref 31–36)
MCV RBC AUTO: 87.6 FL (ref 80–100)
MONOCYTES ABSOLUTE: 0.5 K/UL (ref 0–1.3)
MONOCYTES RELATIVE PERCENT: 5.3 %
NEUTROPHILS ABSOLUTE: 6.9 K/UL (ref 1.7–7.7)
NEUTROPHILS RELATIVE PERCENT: 73.3 %
PDW BLD-RTO: 16 % (ref 12.4–15.4)
PLATELET # BLD: 238 K/UL (ref 135–450)
PMV BLD AUTO: 8.9 FL (ref 5–10.5)
POTASSIUM SERPL-SCNC: 4.5 MMOL/L (ref 3.5–5.1)
RBC # BLD: 5.51 M/UL (ref 4.2–5.9)
SODIUM BLD-SCNC: 140 MMOL/L (ref 136–145)
TOTAL PROTEIN: 8 G/DL (ref 6.4–8.2)
TRIGL SERPL-MCNC: 365 MG/DL (ref 0–150)
VLDLC SERPL CALC-MCNC: ABNORMAL MG/DL
WBC # BLD: 9.4 K/UL (ref 4–11)

## 2018-01-25 PROCEDURE — 99213 OFFICE O/P EST LOW 20 MIN: CPT | Performed by: FAMILY MEDICINE

## 2018-01-25 PROCEDURE — 36415 COLL VENOUS BLD VENIPUNCTURE: CPT | Performed by: FAMILY MEDICINE

## 2018-01-25 RX ORDER — OXYCODONE HCL 20 MG/1
TABLET, FILM COATED, EXTENDED RELEASE ORAL
Qty: 120 TABLET | Refills: 0 | Status: SHIPPED | OUTPATIENT
Start: 2018-01-25 | End: 2018-02-22 | Stop reason: SDUPTHER

## 2018-01-25 RX ORDER — TRAMADOL HYDROCHLORIDE 50 MG/1
100 TABLET ORAL EVERY 6 HOURS PRN
Qty: 120 TABLET | Refills: 0 | Status: SHIPPED | OUTPATIENT
Start: 2018-01-25 | End: 2018-01-25 | Stop reason: CLARIF

## 2018-01-25 RX ORDER — TIZANIDINE 4 MG/1
TABLET ORAL
Qty: 270 TABLET | Refills: 5 | Status: SHIPPED | OUTPATIENT
Start: 2018-01-25 | End: 2018-08-23 | Stop reason: SDUPTHER

## 2018-01-25 RX ORDER — CLONAZEPAM 2 MG/1
1-2 TABLET ORAL 2 TIMES DAILY PRN
Qty: 60 TABLET | Refills: 0 | Status: SHIPPED | OUTPATIENT
Start: 2018-01-25 | End: 2018-02-22 | Stop reason: SDUPTHER

## 2018-01-25 RX ORDER — OXYCODONE HCL 20 MG/1
TABLET, FILM COATED, EXTENDED RELEASE ORAL
Qty: 150 TABLET | Refills: 0 | Status: SHIPPED | OUTPATIENT
Start: 2018-01-25 | End: 2018-01-25 | Stop reason: CLARIF

## 2018-01-25 RX ORDER — TRAMADOL HYDROCHLORIDE 50 MG/1
100 TABLET ORAL EVERY 6 HOURS PRN
Qty: 240 TABLET | Refills: 0 | Status: SHIPPED | OUTPATIENT
Start: 2018-01-25 | End: 2018-02-22 | Stop reason: SDUPTHER

## 2018-01-26 LAB
ESTIMATED AVERAGE GLUCOSE: 131.2 MG/DL
HBA1C MFR BLD: 6.2 %
T4 FREE: 0.7 NG/DL (ref 0.9–1.8)
TSH SERPL DL<=0.05 MIU/L-ACNC: 3.92 UIU/ML (ref 0.27–4.2)
VITAMIN D 25-HYDROXY: 13.1 NG/ML

## 2018-01-29 ASSESSMENT — ENCOUNTER SYMPTOMS
SHORTNESS OF BREATH: 0
VOMITING: 0
CONSTIPATION: 1
ABDOMINAL PAIN: 1
COUGH: 0

## 2018-01-29 NOTE — PATIENT INSTRUCTIONS
Pain overall stable, continue present medications. We'll advise follow-up with pain clinic/pain physician - call if you change your mind. Stop smoking when ready, though realize present stress is not conducive to stopping at this point. Call or return as needed. If doing okay overall, advise repeat office visit in 28-30 days, sooner as needed.

## 2018-02-22 ENCOUNTER — OFFICE VISIT (OUTPATIENT)
Dept: FAMILY MEDICINE CLINIC | Age: 53
End: 2018-02-22

## 2018-02-22 VITALS
HEART RATE: 96 BPM | BODY MASS INDEX: 41.28 KG/M2 | DIASTOLIC BLOOD PRESSURE: 70 MMHG | WEIGHT: 296 LBS | SYSTOLIC BLOOD PRESSURE: 120 MMHG

## 2018-02-22 DIAGNOSIS — N13.5 IDIOPATHIC RETROPERITONEAL FIBROSIS: Primary | ICD-10-CM

## 2018-02-22 DIAGNOSIS — H92.02 OTALGIA, LEFT: ICD-10-CM

## 2018-02-22 DIAGNOSIS — R10.32 LEFT GROIN PAIN: ICD-10-CM

## 2018-02-22 DIAGNOSIS — F41.9 ANXIETY: ICD-10-CM

## 2018-02-22 DIAGNOSIS — F33.41 DEPRESSION, MAJOR, RECURRENT, IN PARTIAL REMISSION (HCC): ICD-10-CM

## 2018-02-22 PROCEDURE — 99213 OFFICE O/P EST LOW 20 MIN: CPT | Performed by: FAMILY MEDICINE

## 2018-02-22 RX ORDER — AMOXICILLIN 500 MG/1
500 CAPSULE ORAL 3 TIMES DAILY
Qty: 30 CAPSULE | Refills: 0 | Status: SHIPPED | OUTPATIENT
Start: 2018-02-22 | End: 2018-03-04

## 2018-02-22 RX ORDER — CLONAZEPAM 2 MG/1
1-2 TABLET ORAL 2 TIMES DAILY PRN
Qty: 60 TABLET | Refills: 0 | Status: SHIPPED | OUTPATIENT
Start: 2018-02-22 | End: 2018-03-27 | Stop reason: SDUPTHER

## 2018-02-22 RX ORDER — TRAMADOL HYDROCHLORIDE 50 MG/1
100 TABLET ORAL EVERY 6 HOURS PRN
Qty: 240 TABLET | Refills: 0 | Status: SHIPPED | OUTPATIENT
Start: 2018-02-22 | End: 2018-04-27 | Stop reason: SDUPTHER

## 2018-02-22 RX ORDER — OXYCODONE HCL 20 MG/1
TABLET, FILM COATED, EXTENDED RELEASE ORAL
Qty: 120 TABLET | Refills: 0 | Status: SHIPPED | OUTPATIENT
Start: 2018-02-22 | End: 2018-03-23 | Stop reason: SDUPTHER

## 2018-02-22 NOTE — PROGRESS NOTES
Subjective:      Patient ID: Fidelia Trevino is a 47 y.o. male. HPI   FU for IFP/left groin pain. States presently decent pain control. Son to be at [facility] for 6 more months, smiling more, not in pain (since teeth pulled). Otherwise, mother ok - may go to rehab due to worsening rehab. Won't like it, tried to talk to her about it - supposedly wondering what the best situation is. Patient afraid what might happen. Pain eased up some this months!!  Stress level is pretty high. Klonopin doesn't do much. Marybel Brown son Daylin Brown) - hasn't seem him for 2 years (!!)  Still getting black stuff out, still smells bad (left ear). Based on a Morphine Equivalent Dose of 80 or higher, the following issues were addressed:    Treatment Goal:  Minimize pain    Progress Toward Goal: yes - mild per patient    Patient Satisfaction with Analgesia:  fair  Medication Side Effects: constipation    Functional Status:       Overall: Moderately impaired     ADLs:   - Eating:  Independent    - Bathing:  Independent   - Grooming:  Independent   - Dressing:  Independent   - Using the toilet:  Independent   - Walking:  Independent       Potential Aberrant Drug-Related Behavior:  None     Controlled Substances Monitoring:   OARRS     Date Narcotic Agreement Signed:  2013   Date of Last Urine Drug Screenin17     Interpretation:  Acceptable      Review of Systems   Constitutional: Positive for fatigue. Negative for chills and fever. Respiratory: Negative for cough. Cardiovascular: Negative for chest pain. Gastrointestinal: Positive for abdominal pain (L groin, chronic). Negative for abdominal distention. Psychiatric/Behavioral: Positive for dysphoric mood. Negative for self-injury and suicidal ideas. The patient is nervous/anxious. Objective:   Physical Exam   Constitutional: He appears well-developed and well-nourished.    HENT:   L EAC with mild erythema, slight swelling, chronic perforation with distorted

## 2018-02-23 ASSESSMENT — ENCOUNTER SYMPTOMS
ABDOMINAL DISTENTION: 0
ABDOMINAL PAIN: 1
COUGH: 0

## 2018-03-23 ENCOUNTER — TELEPHONE (OUTPATIENT)
Dept: FAMILY MEDICINE CLINIC | Age: 53
End: 2018-03-23

## 2018-03-23 DIAGNOSIS — R10.32 LEFT GROIN PAIN: ICD-10-CM

## 2018-03-23 DIAGNOSIS — N13.5 IDIOPATHIC RETROPERITONEAL FIBROSIS: ICD-10-CM

## 2018-03-23 RX ORDER — OXYCODONE HCL 20 MG/1
TABLET, FILM COATED, EXTENDED RELEASE ORAL
Qty: 120 TABLET | Refills: 0 | Status: SHIPPED | OUTPATIENT
Start: 2018-03-23 | End: 2018-03-23 | Stop reason: CLARIF

## 2018-03-23 RX ORDER — OXYCODONE HYDROCHLORIDE 20 MG/1
20 TABLET ORAL EVERY 6 HOURS PRN
Qty: 120 TABLET | Refills: 0 | Status: SHIPPED | OUTPATIENT
Start: 2018-03-23 | End: 2018-03-23 | Stop reason: CLARIF

## 2018-03-23 RX ORDER — OXYCODONE HCL 20 MG/1
TABLET, FILM COATED, EXTENDED RELEASE ORAL
Qty: 120 TABLET | Refills: 0 | Status: SHIPPED | OUTPATIENT
Start: 2018-03-23 | End: 2018-12-18 | Stop reason: SDUPTHER

## 2018-03-23 NOTE — TELEPHONE ENCOUNTER
Rx(s) written and taken up front. (Farnaz Boone destroyed the previous oxycodone Rx already printed out, and I destroyed first OxyContin Rx I printed out, my mistake, reprinted now.)    Nurse - Please let patient know the OxyContin Rx is ready for pickup (at ).            (OARRS was run and reviewed personally by me, Armando Hayawrd, on 03/23/18, and no signs or suspicion of diversion or other abuse.)

## 2018-03-26 ENCOUNTER — TELEPHONE (OUTPATIENT)
Dept: FAMILY MEDICINE CLINIC | Age: 53
End: 2018-03-26

## 2018-03-26 NOTE — TELEPHONE ENCOUNTER
OK with me to fill the tramadol now, or we can just give them verbal for new Rx, same Qty (and they destroy the written Rx), whichever is easier for them.

## 2018-03-26 NOTE — TELEPHONE ENCOUNTER
[facility] called to verify that its okay with the physician to fill the patients tramadol prescription. The prescription was written on 2/22/18, however the patient is just now asking to have it filled. Please verify with [facility] if the physician is okay with refilling this medication.

## 2018-03-27 ENCOUNTER — OFFICE VISIT (OUTPATIENT)
Dept: FAMILY MEDICINE CLINIC | Age: 53
End: 2018-03-27

## 2018-03-27 VITALS
WEIGHT: 301 LBS | HEART RATE: 104 BPM | BODY MASS INDEX: 41.98 KG/M2 | DIASTOLIC BLOOD PRESSURE: 66 MMHG | SYSTOLIC BLOOD PRESSURE: 134 MMHG

## 2018-03-27 DIAGNOSIS — F41.9 ANXIETY: ICD-10-CM

## 2018-03-27 DIAGNOSIS — R10.32 LEFT GROIN PAIN: ICD-10-CM

## 2018-03-27 DIAGNOSIS — N13.5 IDIOPATHIC RETROPERITONEAL FIBROSIS: Primary | ICD-10-CM

## 2018-03-27 PROCEDURE — 99213 OFFICE O/P EST LOW 20 MIN: CPT | Performed by: FAMILY MEDICINE

## 2018-03-27 RX ORDER — CLONAZEPAM 2 MG/1
1-2 TABLET ORAL 2 TIMES DAILY PRN
Qty: 60 TABLET | Refills: 0 | Status: SHIPPED | OUTPATIENT
Start: 2018-03-27 | End: 2018-04-27 | Stop reason: SDUPTHER

## 2018-03-29 ASSESSMENT — ENCOUNTER SYMPTOMS
ABDOMINAL PAIN: 1
CONSTIPATION: 1
SHORTNESS OF BREATH: 0

## 2018-03-29 NOTE — PATIENT INSTRUCTIONS
Advise increase physical activity/exercise as able. Stop smoking when ready. If overall stable, then repeat office visit in 4 weeks, sooner as needed.

## 2018-04-27 ENCOUNTER — OFFICE VISIT (OUTPATIENT)
Dept: FAMILY MEDICINE CLINIC | Age: 53
End: 2018-04-27

## 2018-04-27 VITALS
WEIGHT: 294 LBS | OXYGEN SATURATION: 98 % | HEART RATE: 88 BPM | BODY MASS INDEX: 41 KG/M2 | SYSTOLIC BLOOD PRESSURE: 128 MMHG | DIASTOLIC BLOOD PRESSURE: 84 MMHG | TEMPERATURE: 98.7 F

## 2018-04-27 DIAGNOSIS — F33.41 DEPRESSION, MAJOR, RECURRENT, IN PARTIAL REMISSION (HCC): ICD-10-CM

## 2018-04-27 DIAGNOSIS — R10.32 LEFT GROIN PAIN: Primary | ICD-10-CM

## 2018-04-27 DIAGNOSIS — F41.9 ANXIETY: ICD-10-CM

## 2018-04-27 DIAGNOSIS — N13.5 IDIOPATHIC RETROPERITONEAL FIBROSIS: ICD-10-CM

## 2018-04-27 DIAGNOSIS — H92.02 ACUTE OTALGIA, LEFT: ICD-10-CM

## 2018-04-27 PROCEDURE — 99214 OFFICE O/P EST MOD 30 MIN: CPT | Performed by: FAMILY MEDICINE

## 2018-04-27 RX ORDER — OFLOXACIN 3 MG/ML
4 SOLUTION AURICULAR (OTIC) 3 TIMES DAILY
Qty: 10 ML | Refills: 0 | Status: SHIPPED | OUTPATIENT
Start: 2018-04-27 | End: 2018-12-18 | Stop reason: SDUPTHER

## 2018-04-27 RX ORDER — CLONAZEPAM 2 MG/1
1-2 TABLET ORAL 2 TIMES DAILY PRN
Qty: 60 TABLET | Refills: 0 | Status: SHIPPED | OUTPATIENT
Start: 2018-04-27 | End: 2018-05-04 | Stop reason: SDUPTHER

## 2018-04-27 RX ORDER — TRAMADOL HYDROCHLORIDE 50 MG/1
100 TABLET ORAL EVERY 6 HOURS PRN
Qty: 240 TABLET | Refills: 0 | Status: SHIPPED | OUTPATIENT
Start: 2018-04-27 | End: 2018-05-25 | Stop reason: SDUPTHER

## 2018-04-27 RX ORDER — OXYCODONE HYDROCHLORIDE 20 MG/1
20 TABLET ORAL EVERY 6 HOURS PRN
Qty: 120 TABLET | Refills: 0 | Status: SHIPPED | OUTPATIENT
Start: 2018-04-27 | End: 2018-05-25 | Stop reason: SDUPTHER

## 2018-04-27 RX ORDER — TIZANIDINE 4 MG/1
TABLET ORAL
Qty: 270 TABLET | Refills: 5 | Status: CANCELLED | OUTPATIENT
Start: 2018-04-27

## 2018-04-27 RX ORDER — QUETIAPINE FUMARATE 400 MG/1
400 TABLET, FILM COATED ORAL 2 TIMES DAILY
Qty: 60 TABLET | Refills: 5 | Status: SHIPPED | OUTPATIENT
Start: 2018-04-27 | End: 2018-11-06 | Stop reason: SDUPTHER

## 2018-04-29 ASSESSMENT — ENCOUNTER SYMPTOMS
VOMITING: 0
SHORTNESS OF BREATH: 0
EYE PAIN: 0
NAUSEA: 0
CONSTIPATION: 1
ABDOMINAL PAIN: 0
COUGH: 0

## 2018-05-01 ENCOUNTER — TELEPHONE (OUTPATIENT)
Dept: PAIN MANAGEMENT | Age: 53
End: 2018-05-01

## 2018-05-02 ENCOUNTER — TELEPHONE (OUTPATIENT)
Dept: FAMILY MEDICINE CLINIC | Age: 53
End: 2018-05-02

## 2018-05-02 DIAGNOSIS — F41.9 ANXIETY: ICD-10-CM

## 2018-05-04 RX ORDER — CLONAZEPAM 2 MG/1
1-2 TABLET ORAL 2 TIMES DAILY PRN
Qty: 60 TABLET | Refills: 0 | OUTPATIENT
Start: 2018-05-04 | End: 2018-05-25 | Stop reason: SDUPTHER

## 2018-05-25 ENCOUNTER — OFFICE VISIT (OUTPATIENT)
Dept: FAMILY MEDICINE CLINIC | Age: 53
End: 2018-05-25

## 2018-05-25 VITALS
WEIGHT: 300 LBS | BODY MASS INDEX: 42 KG/M2 | SYSTOLIC BLOOD PRESSURE: 130 MMHG | HEIGHT: 71 IN | HEART RATE: 98 BPM | DIASTOLIC BLOOD PRESSURE: 68 MMHG | OXYGEN SATURATION: 100 %

## 2018-05-25 DIAGNOSIS — F41.9 ANXIETY: ICD-10-CM

## 2018-05-25 DIAGNOSIS — N13.5 IDIOPATHIC RETROPERITONEAL FIBROSIS: Primary | ICD-10-CM

## 2018-05-25 DIAGNOSIS — R10.32 LEFT GROIN PAIN: ICD-10-CM

## 2018-05-25 DIAGNOSIS — H91.93 DECREASED HEARING OF BOTH EARS: ICD-10-CM

## 2018-05-25 DIAGNOSIS — F17.200 TOBACCO USE DISORDER: ICD-10-CM

## 2018-05-25 PROCEDURE — 99213 OFFICE O/P EST LOW 20 MIN: CPT | Performed by: FAMILY MEDICINE

## 2018-05-25 RX ORDER — TRAMADOL HYDROCHLORIDE 50 MG/1
100 TABLET ORAL EVERY 6 HOURS PRN
Qty: 240 TABLET | Refills: 0 | Status: SHIPPED | OUTPATIENT
Start: 2018-05-25 | End: 2018-06-25 | Stop reason: SDUPTHER

## 2018-05-25 RX ORDER — OXYCODONE HYDROCHLORIDE 20 MG/1
20 TABLET ORAL EVERY 6 HOURS PRN
Qty: 120 TABLET | Refills: 0 | Status: SHIPPED | OUTPATIENT
Start: 2018-05-25 | End: 2018-06-24

## 2018-05-25 RX ORDER — CLONAZEPAM 2 MG/1
1-2 TABLET ORAL 2 TIMES DAILY PRN
Qty: 60 TABLET | Refills: 0 | Status: SHIPPED | OUTPATIENT
Start: 2018-05-25 | End: 2018-06-25 | Stop reason: SDUPTHER

## 2018-05-28 ASSESSMENT — ENCOUNTER SYMPTOMS
ABDOMINAL PAIN: 1
CONSTIPATION: 1

## 2018-06-25 ENCOUNTER — OFFICE VISIT (OUTPATIENT)
Dept: FAMILY MEDICINE CLINIC | Age: 53
End: 2018-06-25

## 2018-06-25 VITALS
BODY MASS INDEX: 41.7 KG/M2 | WEIGHT: 299 LBS | HEART RATE: 92 BPM | DIASTOLIC BLOOD PRESSURE: 74 MMHG | SYSTOLIC BLOOD PRESSURE: 118 MMHG

## 2018-06-25 DIAGNOSIS — F17.200 TOBACCO USE DISORDER: ICD-10-CM

## 2018-06-25 DIAGNOSIS — F41.9 ANXIETY: ICD-10-CM

## 2018-06-25 DIAGNOSIS — R10.32 LEFT GROIN PAIN: ICD-10-CM

## 2018-06-25 DIAGNOSIS — N13.5 IDIOPATHIC RETROPERITONEAL FIBROSIS: Primary | ICD-10-CM

## 2018-06-25 PROCEDURE — 99213 OFFICE O/P EST LOW 20 MIN: CPT | Performed by: FAMILY MEDICINE

## 2018-06-25 RX ORDER — TRAMADOL HYDROCHLORIDE 50 MG/1
100 TABLET ORAL EVERY 6 HOURS PRN
Qty: 240 TABLET | Refills: 0 | Status: SHIPPED | OUTPATIENT
Start: 2018-06-25 | End: 2018-07-23 | Stop reason: SDUPTHER

## 2018-06-25 RX ORDER — OXYCODONE HYDROCHLORIDE 20 MG/1
20 TABLET ORAL EVERY 6 HOURS PRN
Qty: 120 TABLET | Refills: 0 | Status: SHIPPED | OUTPATIENT
Start: 2018-06-25 | End: 2018-07-23 | Stop reason: SDUPTHER

## 2018-06-25 RX ORDER — CLONAZEPAM 2 MG/1
1-2 TABLET ORAL 2 TIMES DAILY PRN
Qty: 60 TABLET | Refills: 0 | Status: SHIPPED | OUTPATIENT
Start: 2018-06-25 | End: 2018-07-23 | Stop reason: SDUPTHER

## 2018-06-25 RX ORDER — OXYCODONE HYDROCHLORIDE 20 MG/1
20 TABLET ORAL EVERY 6 HOURS PRN
COMMUNITY
End: 2018-06-25 | Stop reason: SDUPTHER

## 2018-06-25 NOTE — PROGRESS NOTES
Effort normal and breath sounds normal. No respiratory distress. Abdominal: Tenderness: moderate TTP at left groin. Neurological: He is alert. Nursing note and vitals reviewed. Assessment:      Encounter Diagnoses   Name Primary?  Idiopathic retroperitoneal fibrosis Yes    Left groin pain     Anxiety     Tobacco use disorder          Plan:      Per orders - await results. Idiopathic retroperitoneal fibrosis essentially stable, at least per overall pain level. Stop smoking when able, though realize no plans to stop an immediate future. Try to gradually increase activity, also try to slowly her gradually lose some weight. If overall feeling well, then repeat office visit in 4 weeks, sooner as needed.

## 2018-07-23 ENCOUNTER — OFFICE VISIT (OUTPATIENT)
Dept: FAMILY MEDICINE CLINIC | Age: 53
End: 2018-07-23

## 2018-07-23 VITALS
SYSTOLIC BLOOD PRESSURE: 142 MMHG | RESPIRATION RATE: 20 BRPM | HEIGHT: 71 IN | OXYGEN SATURATION: 98 % | HEART RATE: 90 BPM | DIASTOLIC BLOOD PRESSURE: 84 MMHG | BODY MASS INDEX: 42.56 KG/M2 | WEIGHT: 304 LBS

## 2018-07-23 DIAGNOSIS — Z51.81 MEDICATION MONITORING ENCOUNTER: ICD-10-CM

## 2018-07-23 DIAGNOSIS — F17.200 TOBACCO USE DISORDER: ICD-10-CM

## 2018-07-23 DIAGNOSIS — E55.9 VITAMIN D DEFICIENCY: ICD-10-CM

## 2018-07-23 DIAGNOSIS — N13.5 IDIOPATHIC RETROPERITONEAL FIBROSIS: Primary | ICD-10-CM

## 2018-07-23 DIAGNOSIS — E03.9 HYPOTHYROIDISM, UNSPECIFIED TYPE: ICD-10-CM

## 2018-07-23 DIAGNOSIS — R10.32 LEFT GROIN PAIN: ICD-10-CM

## 2018-07-23 DIAGNOSIS — F41.9 ANXIETY: ICD-10-CM

## 2018-07-23 DIAGNOSIS — N13.30 HYDRONEPHROSIS OF RIGHT KIDNEY: ICD-10-CM

## 2018-07-23 DIAGNOSIS — H91.93 DECREASED HEARING OF BOTH EARS: ICD-10-CM

## 2018-07-23 DIAGNOSIS — R73.09 ELEVATED GLUCOSE: ICD-10-CM

## 2018-07-23 DIAGNOSIS — E78.5 HYPERLIPIDEMIA, UNSPECIFIED HYPERLIPIDEMIA TYPE: ICD-10-CM

## 2018-07-23 LAB
BASOPHILS ABSOLUTE: 0.1 K/UL (ref 0–0.2)
BASOPHILS RELATIVE PERCENT: 0.8 %
EOSINOPHILS ABSOLUTE: 0.2 K/UL (ref 0–0.6)
EOSINOPHILS RELATIVE PERCENT: 2.2 %
HCT VFR BLD CALC: 44.7 % (ref 40.5–52.5)
HEMOGLOBIN: 15 G/DL (ref 13.5–17.5)
LYMPHOCYTES ABSOLUTE: 2.1 K/UL (ref 1–5.1)
LYMPHOCYTES RELATIVE PERCENT: 27.1 %
MCH RBC QN AUTO: 30.1 PG (ref 26–34)
MCHC RBC AUTO-ENTMCNC: 33.6 G/DL (ref 31–36)
MCV RBC AUTO: 89.5 FL (ref 80–100)
MONOCYTES ABSOLUTE: 0.5 K/UL (ref 0–1.3)
MONOCYTES RELATIVE PERCENT: 6.6 %
NEUTROPHILS ABSOLUTE: 4.9 K/UL (ref 1.7–7.7)
NEUTROPHILS RELATIVE PERCENT: 63.3 %
PDW BLD-RTO: 16.8 % (ref 12.4–15.4)
PLATELET # BLD: 256 K/UL (ref 135–450)
PMV BLD AUTO: 8.3 FL (ref 5–10.5)
RBC # BLD: 5 M/UL (ref 4.2–5.9)
WBC # BLD: 7.8 K/UL (ref 4–11)

## 2018-07-23 PROCEDURE — 36415 COLL VENOUS BLD VENIPUNCTURE: CPT | Performed by: FAMILY MEDICINE

## 2018-07-23 PROCEDURE — 99214 OFFICE O/P EST MOD 30 MIN: CPT | Performed by: FAMILY MEDICINE

## 2018-07-23 RX ORDER — TRAMADOL HYDROCHLORIDE 50 MG/1
100 TABLET ORAL EVERY 6 HOURS PRN
Qty: 240 TABLET | Refills: 0 | Status: SHIPPED | OUTPATIENT
Start: 2018-07-23 | End: 2018-08-23 | Stop reason: SDUPTHER

## 2018-07-23 RX ORDER — CLONAZEPAM 2 MG/1
1-2 TABLET ORAL 2 TIMES DAILY PRN
Qty: 60 TABLET | Refills: 0 | Status: SHIPPED | OUTPATIENT
Start: 2018-07-23 | End: 2018-08-23 | Stop reason: SDUPTHER

## 2018-07-23 RX ORDER — OXYCODONE HYDROCHLORIDE 20 MG/1
20 TABLET ORAL EVERY 6 HOURS PRN
Qty: 120 TABLET | Refills: 0 | Status: SHIPPED | OUTPATIENT
Start: 2018-07-23 | End: 2018-08-23 | Stop reason: SDUPTHER

## 2018-07-23 NOTE — PROGRESS NOTES
Subjective:      Patient ID: Jose Rocha is a 46 y.o. male. HPI  FU for chronic L groin pain (with know IFP) and anxiety. Edouard Beltran (son) - tried one of his, seems to work better. Last month better than previous month, not sure why. Tries to stay in middle (regarding temps) - mother moved out, he moved some stuff in for her. She got her own apt, on [address], wanted to do that for a while. Son - doing better, states just needs to get a job. States he put in application in at [facility] - is staying 1/2 time with patient's mother, and 1/2 time with patient. Only thing he admitted to is taking sleeping pills. Protein shakes today. Last ate 2 hrs ago, couple ham sandwiches. Vit D - not taking any. No thyroid medication - white pills, round, small, tiny. 126/70,R, 4:19PM.3 MD/day, diet up and down. Diet - mainly frozen - ham and egg criossants, premade, or ham sandwich, PB&J, cookies, , potato chips. Based on a Morphine Equivalent Dose of 80 or higher, the following issues were addressed:    Treatment Goal:  Minimize pain    Progress Toward Goal: yes - some    Patient Satisfaction with Analgesia:  fair  Medication Side Effects: constipation    Functional Status:       Overall:  Severely impaired     ADLs:   - Eating:  Independent    - Bathing:  Independent   - Grooming:  Independent   - Dressing:  Independent   - Using the toilet:  Independent   - Walking:  Independent       Potential Aberrant Drug-Related Behavior:  None    Controlled Substances Monitoring:   OARRS (()      Date Narcotic Agreement Signed:  1973  Date of Last Urine Drug Screenin17     Interpretation:  Acceptable          Review of Systems   Constitutional: Negative for chills and fever. HENT: Negative for ear pain. Eyes: Negative for pain. Respiratory: Negative for cough. Cardiovascular: Negative for chest pain. Gastrointestinal: Positive for abdominal pain (see HPI).    Genitourinary: Negative for difficulty urinating and dysuria. Psychiatric/Behavioral: Positive for dysphoric mood (slight). The patient is nervous/anxious (mild). Objective:   Physical Exam   Constitutional: He appears well-developed and well-nourished. HENT:   Head: Normocephalic. Cardiovascular: Normal rate, regular rhythm, normal heart sounds and intact distal pulses. Pulmonary/Chest: Effort normal and breath sounds normal. No respiratory distress. Neurological: He is alert. Nursing note and vitals reviewed. Assessment:      Encounter Diagnoses   Name Primary?  Idiopathic retroperitoneal fibrosis Yes    Left groin pain     Anxiety     Decreased hearing of both ears     Hydronephrosis of right kidney     Hypothyroidism, unspecified type     Tobacco use disorder     Vitamin D deficiency     Hyperlipidemia, unspecified hyperlipidemia type     Elevated glucose     Medication monitoring encounter          Plan:      Per orders - await results. Blood draw done today as planned, as patient wants to get repeat CAT scan done within the next few weeks. Stop tobacco when ready, though realize no plans to quit in the immediate future. If labs stable, and overall doing well, then repeat office visit in 28-30 days,, sooner as needed. Length of visit over 25 minutes, and >50% of time spent counseling and coordinating care.

## 2018-07-24 LAB
A/G RATIO: 1.3 (ref 1.1–2.2)
ALBUMIN SERPL-MCNC: 4.4 G/DL (ref 3.4–5)
ALP BLD-CCNC: 109 U/L (ref 40–129)
ALT SERPL-CCNC: 20 U/L (ref 10–40)
ANION GAP SERPL CALCULATED.3IONS-SCNC: 17 MMOL/L (ref 3–16)
AST SERPL-CCNC: 20 U/L (ref 15–37)
BILIRUB SERPL-MCNC: 0.3 MG/DL (ref 0–1)
BUN BLDV-MCNC: 14 MG/DL (ref 7–20)
CALCIUM SERPL-MCNC: 10.1 MG/DL (ref 8.3–10.6)
CHLORIDE BLD-SCNC: 100 MMOL/L (ref 99–110)
CO2: 26 MMOL/L (ref 21–32)
CREAT SERPL-MCNC: 1 MG/DL (ref 0.9–1.3)
ESTIMATED AVERAGE GLUCOSE: 128.4 MG/DL
GFR AFRICAN AMERICAN: >60
GFR NON-AFRICAN AMERICAN: >60
GLOBULIN: 3.3 G/DL
GLUCOSE BLD-MCNC: 118 MG/DL (ref 70–99)
HBA1C MFR BLD: 6.1 %
POTASSIUM SERPL-SCNC: 4.5 MMOL/L (ref 3.5–5.1)
SODIUM BLD-SCNC: 143 MMOL/L (ref 136–145)
T4 FREE: 0.6 NG/DL (ref 0.9–1.8)
TOTAL PROTEIN: 7.7 G/DL (ref 6.4–8.2)
TSH SERPL DL<=0.05 MIU/L-ACNC: 4.35 UIU/ML (ref 0.27–4.2)
VITAMIN D 25-HYDROXY: 12.1 NG/ML

## 2018-07-24 ASSESSMENT — ENCOUNTER SYMPTOMS
ABDOMINAL PAIN: 1
EYE PAIN: 0
COUGH: 0

## 2018-07-25 ASSESSMENT — ENCOUNTER SYMPTOMS
SHORTNESS OF BREATH: 0
ABDOMINAL PAIN: 1
CONSTIPATION: 1

## 2018-07-31 DIAGNOSIS — E03.9 HYPOTHYROIDISM, UNSPECIFIED TYPE: ICD-10-CM

## 2018-07-31 RX ORDER — LEVOTHYROXINE SODIUM 0.07 MG/1
TABLET ORAL
Qty: 30 TABLET | Refills: 12 | Status: SHIPPED | OUTPATIENT
Start: 2018-07-31 | End: 2019-08-21 | Stop reason: SDUPTHER

## 2018-08-23 ENCOUNTER — OFFICE VISIT (OUTPATIENT)
Dept: FAMILY MEDICINE CLINIC | Age: 53
End: 2018-08-23

## 2018-08-23 VITALS
WEIGHT: 299 LBS | SYSTOLIC BLOOD PRESSURE: 130 MMHG | DIASTOLIC BLOOD PRESSURE: 66 MMHG | HEART RATE: 84 BPM | BODY MASS INDEX: 41.7 KG/M2

## 2018-08-23 DIAGNOSIS — N13.5 IDIOPATHIC RETROPERITONEAL FIBROSIS: Primary | ICD-10-CM

## 2018-08-23 DIAGNOSIS — R10.32 LEFT GROIN PAIN: ICD-10-CM

## 2018-08-23 DIAGNOSIS — F41.9 ANXIETY: ICD-10-CM

## 2018-08-23 PROCEDURE — 99213 OFFICE O/P EST LOW 20 MIN: CPT | Performed by: FAMILY MEDICINE

## 2018-08-23 RX ORDER — TIZANIDINE 4 MG/1
TABLET ORAL
Qty: 270 TABLET | Refills: 5 | Status: SHIPPED | OUTPATIENT
Start: 2018-08-23 | End: 2018-10-19 | Stop reason: SDUPTHER

## 2018-08-23 RX ORDER — TRAMADOL HYDROCHLORIDE 50 MG/1
100 TABLET ORAL EVERY 6 HOURS PRN
Qty: 240 TABLET | Refills: 0 | Status: SHIPPED | OUTPATIENT
Start: 2018-08-23 | End: 2018-09-21 | Stop reason: SDUPTHER

## 2018-08-23 RX ORDER — CLONAZEPAM 2 MG/1
1-2 TABLET ORAL 2 TIMES DAILY PRN
Qty: 60 TABLET | Refills: 0 | Status: SHIPPED | OUTPATIENT
Start: 2018-08-23 | End: 2018-09-21 | Stop reason: SDUPTHER

## 2018-08-23 RX ORDER — OXYCODONE HYDROCHLORIDE 20 MG/1
20 TABLET ORAL EVERY 6 HOURS PRN
Qty: 120 TABLET | Refills: 0 | Status: SHIPPED | OUTPATIENT
Start: 2018-08-23 | End: 2018-09-21 | Stop reason: SDUPTHER

## 2018-08-23 NOTE — PROGRESS NOTES
Subjective:      Patient ID: Jose Rocha is a 46 y.o. male. HPI   FU for retroperitoneal fibrosis. Still 1 PPD, no plans to stop smoking. Will try to move in with mother (her new apt) next month, Oct ast the latest.  His lease up in Oct.  Done to 2-3 cans MD per day, regular. Restarted smoking this past early , early spring - quit 3-4 yrs, previously up to  Yrs. Based on a Morphine Equivalent Dose of 80 or higher, the following issues were addressed:    Treatment Goal:  Minimize pain    Progress Toward Goal: no    Patient Satisfaction with Analgesia:  fair  Medication Side Effects: constipation    Functional Status:       Overall: Moderately impaired     ADLs:   - Eating:  Independent    - Bathing:  Independent   - Grooming:  Independent   - Dressing:  Independent   - Using the toilet:  Independent   - Walking:  Independent       Potential Aberrant Drug-Related Behavior: None      Controlled Substances Monitoring:   OARRS     Date Narcotic Agreement Signed:  1973   Date of Last Urine Drug Screenin1973     Interpretation:  Acceptable       Review of Systems   Constitutional: Negative for chills and fatigue. Respiratory: Negative for shortness of breath. Cardiovascular: Negative for chest pain. Gastrointestinal: Positive for abdominal pain (L groin, ongoing). Objective:   Physical Exam   Constitutional: He appears well-developed and well-nourished. Cardiovascular: Normal rate, regular rhythm and normal heart sounds. Pulmonary/Chest: Effort normal and breath sounds normal. No respiratory distress. Abdominal: There is tenderness (significant TTP at left groin, unchanged). Neurological: He is alert. Nursing note and vitals reviewed. Assessment:      Encounter Diagnoses   Name Primary?     Idiopathic retroperitoneal fibrosis Yes    Left groin pain     Anxiety          Plan:      Per orders - CAT scan has not yet been ordered, patient to check with the front desk to find out why. Overall pain from the idiopathic retroperitoneal fibrosis is essentially unchanged. Continue taking daily thyroid medicine and daily vitamin D - plan to recheck thyroid tests and vitamin D within the next 2 months. Follow-up office visit in 4 weeks, sooner as needed. Advise low-fat and low sweets diet, also try to stay as active physically as possible.           Howard Walls MD

## 2018-08-24 ASSESSMENT — ENCOUNTER SYMPTOMS
ABDOMINAL PAIN: 1
SHORTNESS OF BREATH: 0

## 2018-09-21 DIAGNOSIS — N13.5 IDIOPATHIC RETROPERITONEAL FIBROSIS: ICD-10-CM

## 2018-09-21 DIAGNOSIS — R10.32 LEFT GROIN PAIN: ICD-10-CM

## 2018-09-21 DIAGNOSIS — F41.9 ANXIETY: ICD-10-CM

## 2018-09-21 RX ORDER — OXYCODONE HYDROCHLORIDE 20 MG/1
20 TABLET ORAL EVERY 6 HOURS PRN
Qty: 120 TABLET | Refills: 0 | Status: SHIPPED | OUTPATIENT
Start: 2018-09-21 | End: 2018-10-19 | Stop reason: SDUPTHER

## 2018-09-21 RX ORDER — TRAMADOL HYDROCHLORIDE 50 MG/1
100 TABLET ORAL EVERY 6 HOURS PRN
Qty: 240 TABLET | Refills: 0 | Status: SHIPPED | OUTPATIENT
Start: 2018-09-21 | End: 2018-10-19 | Stop reason: SDUPTHER

## 2018-09-21 RX ORDER — CLONAZEPAM 2 MG/1
1-2 TABLET ORAL 2 TIMES DAILY PRN
Qty: 60 TABLET | Refills: 0 | Status: SHIPPED | OUTPATIENT
Start: 2018-09-21 | End: 2018-10-19 | Stop reason: SDUPTHER

## 2018-09-21 NOTE — TELEPHONE ENCOUNTER
Dolph Cooks [phone] (home)    is requesting refill(s) of medication oxycodone to preferred pharmacy [place] [phone] 08-23-18 (pertaining to medication)   Last refill 08-23-18 (per medication requested)  Next office visit scheduled or attempted Yes  Date 09-24-18  If No, reason MADE    Dolph Cooks [phone] (home)    is requesting refill(s) of medication clonazepam to preferred pharmacy [place] [phone] 08-23-18 (pertaining to medication)   Last refill 08-23-18 (per medication requested)  Next office visit scheduled or attempted Yes  Date 09-24-18  If No, reason MADE      Dolph Cooks [phone] (home)    is requesting refill(s) of medication TRAMADOL to preferred pharmacy [place] [phone] 08-23-18 (pertaining to medication)   Last refill 08-23-18 (per medication requested)  Next office visit scheduled or attempted Yes  Date 09-24-18  If No, reason MADE

## 2018-09-24 ENCOUNTER — OFFICE VISIT (OUTPATIENT)
Dept: FAMILY MEDICINE CLINIC | Age: 53
End: 2018-09-24
Payer: MEDICARE

## 2018-09-24 VITALS
WEIGHT: 292 LBS | DIASTOLIC BLOOD PRESSURE: 66 MMHG | HEART RATE: 84 BPM | SYSTOLIC BLOOD PRESSURE: 130 MMHG | BODY MASS INDEX: 40.73 KG/M2

## 2018-09-24 DIAGNOSIS — Z23 NEED FOR INFLUENZA VACCINATION: ICD-10-CM

## 2018-09-24 DIAGNOSIS — F33.41 DEPRESSION, MAJOR, RECURRENT, IN PARTIAL REMISSION (HCC): ICD-10-CM

## 2018-09-24 DIAGNOSIS — N13.5 IDIOPATHIC RETROPERITONEAL FIBROSIS: Primary | ICD-10-CM

## 2018-09-24 PROCEDURE — G0008 ADMIN INFLUENZA VIRUS VAC: HCPCS | Performed by: FAMILY MEDICINE

## 2018-09-24 PROCEDURE — 90686 IIV4 VACC NO PRSV 0.5 ML IM: CPT | Performed by: FAMILY MEDICINE

## 2018-09-24 PROCEDURE — 99213 OFFICE O/P EST LOW 20 MIN: CPT | Performed by: FAMILY MEDICINE

## 2018-09-24 NOTE — PROGRESS NOTES
Subjective:      Patient ID: Ashutosh Yancey is a 46 y.o. male. HPI   FU for L groin pain. Was vomiting for nearly 1.5 weeks, off and on, no diarrhea, and the CT scan was scheduled right in the middle. Did lose some weight. Some liquids would stay down (water and some [facility]), solids did not stay down - almost went to ER, but didn't. Finally did stop, and no F/C, thinks was maybe stomach virus, no unusual food, no known exposures. He wants me to start an appeal - he is to call [phone]. Brown Liquid - almost clear, never green or yellow. CT was scheduled for 18, at [facility]. Last year, had some vomiting and diarrhea, lasted only about 3 days, Oct/2017. Still some ear drops, takes on occasion. Based on a Morphine Equivalent Dose of 80 or higher, the following issues were addressed:    Treatment Goal:  Minimize pain    Progress Toward Goal: no    Patient Satisfaction with Analgesia:  fair  Medication Side Effects: constipation    Functional Status:       Overall:  Severely impaired     ADLs:   - Eating:  Independent    - Bathing:  Independent   - Grooming:  Independent   - Dressing:  Independent   - Using the toilet:  Independent   - Walking:  Independent       Potential Aberrant Drug-Related Behavior:  None. Controlled Substances Monitoring:   OARRS     Date Narcotic Agreement Signed:  2013  Date of Last Urine Drug Screenin17     Interpretation:  Acceptable      Review of Systems   Constitutional: Negative for chills and fever. Gastrointestinal: Positive for abdominal pain. Objective:   Physical Exam   Constitutional: He appears well-developed and well-nourished. Cardiovascular: Normal rate, regular rhythm and normal heart sounds. Pulmonary/Chest: Effort normal and breath sounds normal. No respiratory distress. Abdominal: There is tenderness (mild to moderate tenderness to palpation left groin, chronic). Neurological: He is alert.    Nursing note and

## 2018-09-24 NOTE — PROGRESS NOTES
Vaccine Information Sheet, \"Influenza - Inactivated\"  given to Jersey Delatorre, or parent/legal guardian of  Jersey Delatorre and verbalized understanding. Patient responses:    Have you ever had a reaction to a flu vaccine? No  Are you able to eat eggs without adverse effects? Yes  Do you have any current illness? No  Have you ever had Guillian [place] Syndrome? No    Flu vaccine given per order. Please see immunization tab.

## 2018-09-25 ASSESSMENT — ENCOUNTER SYMPTOMS: ABDOMINAL PAIN: 1

## 2018-10-19 ENCOUNTER — OFFICE VISIT (OUTPATIENT)
Dept: FAMILY MEDICINE CLINIC | Age: 53
End: 2018-10-19
Payer: MEDICARE

## 2018-10-19 VITALS
HEART RATE: 93 BPM | WEIGHT: 298 LBS | OXYGEN SATURATION: 99 % | DIASTOLIC BLOOD PRESSURE: 62 MMHG | HEIGHT: 71 IN | BODY MASS INDEX: 41.72 KG/M2 | SYSTOLIC BLOOD PRESSURE: 127 MMHG

## 2018-10-19 DIAGNOSIS — F33.41 DEPRESSION, MAJOR, RECURRENT, IN PARTIAL REMISSION (HCC): ICD-10-CM

## 2018-10-19 DIAGNOSIS — F17.200 TOBACCO USE DISORDER: ICD-10-CM

## 2018-10-19 DIAGNOSIS — N13.5 IDIOPATHIC RETROPERITONEAL FIBROSIS: Primary | ICD-10-CM

## 2018-10-19 DIAGNOSIS — F41.9 ANXIETY: ICD-10-CM

## 2018-10-19 DIAGNOSIS — R10.32 LEFT GROIN PAIN: ICD-10-CM

## 2018-10-19 PROCEDURE — 99213 OFFICE O/P EST LOW 20 MIN: CPT | Performed by: FAMILY MEDICINE

## 2018-10-19 RX ORDER — TIZANIDINE 4 MG/1
TABLET ORAL
Qty: 270 TABLET | Refills: 5 | Status: SHIPPED | OUTPATIENT
Start: 2018-10-19 | End: 2019-01-23 | Stop reason: SDUPTHER

## 2018-10-19 RX ORDER — TRAMADOL HYDROCHLORIDE 50 MG/1
100 TABLET ORAL EVERY 6 HOURS PRN
Qty: 240 TABLET | Refills: 0 | Status: SHIPPED | OUTPATIENT
Start: 2018-10-19 | End: 2018-11-19 | Stop reason: SDUPTHER

## 2018-10-19 RX ORDER — CLONAZEPAM 2 MG/1
1-2 TABLET ORAL 2 TIMES DAILY PRN
Qty: 60 TABLET | Refills: 0 | Status: SHIPPED | OUTPATIENT
Start: 2018-10-19 | End: 2018-11-19 | Stop reason: SDUPTHER

## 2018-10-19 RX ORDER — OXYCODONE HYDROCHLORIDE 20 MG/1
20 TABLET ORAL EVERY 6 HOURS PRN
Qty: 120 TABLET | Refills: 0 | Status: SHIPPED | OUTPATIENT
Start: 2018-10-19 | End: 2018-11-18

## 2018-10-19 NOTE — PROGRESS NOTES
Normal rate, regular rhythm and normal heart sounds. Pulmonary/Chest: Effort normal and breath sounds normal. No respiratory distress. Abdominal: There is tenderness (still definite tenderness to palpation left groin, essentially unchanged). Neurological: He is alert. Psychiatric: His speech is normal and behavior is normal. His mood appears anxious (mild). He exhibits a depressed mood (mild). Nursing note and vitals reviewed. Assessment:      Encounter Diagnoses   Name Primary?  Idiopathic retroperitoneal fibrosis Yes    Left groin pain     Anxiety     Depression, major, recurrent, in partial remission ([facility] [address]     Tobacco use disorder          Plan:      Per orders - await results. Left groin pain essentially unchanged, anxiety and depression essentially stable, continue present medications. Stop smoking when ready, though no plans to stop an immediate future. If doing decent, then repeat office visit in 4 weeks, sooner as needed.           Holley Macario MD

## 2018-10-22 ASSESSMENT — ENCOUNTER SYMPTOMS
SHORTNESS OF BREATH: 0
CONSTIPATION: 1
ABDOMINAL PAIN: 1

## 2018-11-19 ENCOUNTER — OFFICE VISIT (OUTPATIENT)
Dept: FAMILY MEDICINE CLINIC | Age: 53
End: 2018-11-19
Payer: MEDICARE

## 2018-11-19 VITALS
OXYGEN SATURATION: 98 % | BODY MASS INDEX: 41.22 KG/M2 | WEIGHT: 294.4 LBS | DIASTOLIC BLOOD PRESSURE: 82 MMHG | HEART RATE: 105 BPM | SYSTOLIC BLOOD PRESSURE: 140 MMHG | HEIGHT: 71 IN

## 2018-11-19 DIAGNOSIS — F41.9 ANXIETY: ICD-10-CM

## 2018-11-19 DIAGNOSIS — R10.32 LEFT GROIN PAIN: ICD-10-CM

## 2018-11-19 DIAGNOSIS — N13.5 IDIOPATHIC RETROPERITONEAL FIBROSIS: ICD-10-CM

## 2018-11-19 PROCEDURE — 99213 OFFICE O/P EST LOW 20 MIN: CPT | Performed by: PHYSICIAN ASSISTANT

## 2018-11-19 RX ORDER — TRAMADOL HYDROCHLORIDE 50 MG/1
100 TABLET ORAL EVERY 6 HOURS PRN
Qty: 240 TABLET | Refills: 0 | Status: SHIPPED | OUTPATIENT
Start: 2018-11-19 | End: 2019-01-04 | Stop reason: SDUPTHER

## 2018-11-19 RX ORDER — CLONAZEPAM 2 MG/1
1-2 TABLET ORAL 2 TIMES DAILY PRN
Qty: 60 TABLET | Refills: 0 | Status: SHIPPED | OUTPATIENT
Start: 2018-11-19 | End: 2019-01-04 | Stop reason: SDUPTHER

## 2018-11-19 RX ORDER — OXYCODONE HYDROCHLORIDE 20 MG/1
20 TABLET ORAL EVERY 6 HOURS PRN
Qty: 120 TABLET | Refills: 0 | Status: SHIPPED | OUTPATIENT
Start: 2018-11-19 | End: 2019-01-15 | Stop reason: SDUPTHER

## 2018-11-19 ASSESSMENT — ENCOUNTER SYMPTOMS
CONSTIPATION: 1
ABDOMINAL PAIN: 1

## 2018-12-18 ENCOUNTER — OFFICE VISIT (OUTPATIENT)
Dept: FAMILY MEDICINE CLINIC | Age: 53
End: 2018-12-18
Payer: MEDICARE

## 2018-12-18 VITALS
HEART RATE: 96 BPM | BODY MASS INDEX: 41.41 KG/M2 | SYSTOLIC BLOOD PRESSURE: 126 MMHG | WEIGHT: 295.8 LBS | OXYGEN SATURATION: 97 % | HEIGHT: 71 IN | DIASTOLIC BLOOD PRESSURE: 78 MMHG

## 2018-12-18 DIAGNOSIS — H92.03 CHRONIC PAIN OF BOTH EARS: ICD-10-CM

## 2018-12-18 DIAGNOSIS — R10.32 LEFT GROIN PAIN: ICD-10-CM

## 2018-12-18 DIAGNOSIS — G89.29 CHRONIC PAIN OF BOTH EARS: ICD-10-CM

## 2018-12-18 DIAGNOSIS — N13.5 IDIOPATHIC RETROPERITONEAL FIBROSIS: Primary | ICD-10-CM

## 2018-12-18 PROCEDURE — 99213 OFFICE O/P EST LOW 20 MIN: CPT | Performed by: PHYSICIAN ASSISTANT

## 2018-12-18 RX ORDER — OFLOXACIN 3 MG/ML
4 SOLUTION AURICULAR (OTIC) 3 TIMES DAILY
Qty: 10 ML | Refills: 0 | Status: SHIPPED | OUTPATIENT
Start: 2018-12-18 | End: 2019-01-15 | Stop reason: ALTCHOICE

## 2018-12-18 RX ORDER — OXYCODONE HCL 20 MG/1
40 TABLET, FILM COATED, EXTENDED RELEASE ORAL EVERY 12 HOURS
Qty: 120 TABLET | Refills: 0 | Status: SHIPPED | OUTPATIENT
Start: 2018-12-18 | End: 2019-01-17

## 2019-01-04 ENCOUNTER — TELEPHONE (OUTPATIENT)
Dept: FAMILY MEDICINE CLINIC | Age: 54
End: 2019-01-04

## 2019-01-04 DIAGNOSIS — F41.9 ANXIETY: ICD-10-CM

## 2019-01-04 DIAGNOSIS — R10.32 LEFT GROIN PAIN: ICD-10-CM

## 2019-01-04 DIAGNOSIS — N13.5 IDIOPATHIC RETROPERITONEAL FIBROSIS: ICD-10-CM

## 2019-01-04 RX ORDER — CLONAZEPAM 2 MG/1
1-2 TABLET ORAL 2 TIMES DAILY PRN
Qty: 60 TABLET | Refills: 0 | OUTPATIENT
Start: 2019-01-04 | End: 2019-02-04 | Stop reason: SDUPTHER

## 2019-01-04 RX ORDER — TRAMADOL HYDROCHLORIDE 50 MG/1
100 TABLET ORAL EVERY 6 HOURS PRN
Qty: 240 TABLET | Refills: 0 | OUTPATIENT
Start: 2019-01-04 | End: 2019-02-04 | Stop reason: SDUPTHER

## 2019-01-15 ENCOUNTER — OFFICE VISIT (OUTPATIENT)
Dept: FAMILY MEDICINE CLINIC | Age: 54
End: 2019-01-15
Payer: MEDICARE

## 2019-01-15 VITALS
HEIGHT: 71 IN | WEIGHT: 298.2 LBS | HEART RATE: 96 BPM | DIASTOLIC BLOOD PRESSURE: 60 MMHG | SYSTOLIC BLOOD PRESSURE: 124 MMHG | BODY MASS INDEX: 41.75 KG/M2 | OXYGEN SATURATION: 99 %

## 2019-01-15 DIAGNOSIS — N13.5 IDIOPATHIC RETROPERITONEAL FIBROSIS: ICD-10-CM

## 2019-01-15 DIAGNOSIS — Z79.899 LONG TERM USE OF DRUG: ICD-10-CM

## 2019-01-15 DIAGNOSIS — F41.9 ANXIETY: ICD-10-CM

## 2019-01-15 DIAGNOSIS — R10.32 LEFT GROIN PAIN: Primary | ICD-10-CM

## 2019-01-15 PROCEDURE — 99213 OFFICE O/P EST LOW 20 MIN: CPT | Performed by: FAMILY MEDICINE

## 2019-01-15 RX ORDER — OXYCODONE HCL 20 MG/1
40 TABLET, FILM COATED, EXTENDED RELEASE ORAL EVERY 12 HOURS
Qty: 120 TABLET | Refills: 0 | Status: CANCELLED | OUTPATIENT
Start: 2019-01-15 | End: 2019-02-14

## 2019-01-15 RX ORDER — OXYCODONE HYDROCHLORIDE 20 MG/1
20 TABLET ORAL EVERY 6 HOURS PRN
Qty: 120 TABLET | Refills: 0 | Status: SHIPPED | OUTPATIENT
Start: 2019-01-15 | End: 2019-02-15 | Stop reason: SDUPTHER

## 2019-01-21 LAB
6-ACETYLMORPHINE: NOT DETECTED
7-AMINOCLONAZEPAM: PRESENT
ALPHA-OH-ALPRAZOLAM: NOT DETECTED
ALPRAZOLAM: NOT DETECTED
AMPHETAMINE: NOT DETECTED
BARBITURATES: NOT DETECTED
BENZOYLECGONINE: NOT DETECTED
BUPRENORPHINE: NOT DETECTED
CARISOPRODOL: NOT DETECTED
CLONAZEPAM: NOT DETECTED
CODEINE: NOT DETECTED
CREATININE URINE: 215.1 MG/DL (ref 20–400)
DIAZEPAM: NOT DETECTED
DRUGS EXPECTED: NORMAL
EER PAIN MGT DRUG PANEL, HIGH RES/EMIT U: NORMAL
ETHYL GLUCURONIDE: NOT DETECTED
FENTANYL: NOT DETECTED
HYDROCODONE: NOT DETECTED
HYDROMORPHONE: NOT DETECTED
LORAZEPAM: NOT DETECTED
MARIJUANA METABOLITE: NOT DETECTED
MDA: NOT DETECTED
MDEA: NOT DETECTED
MDMA URINE: NOT DETECTED
MEPERIDINE: NOT DETECTED
METHADONE: NOT DETECTED
METHAMPHETAMINE: NOT DETECTED
METHYLPHENIDATE: NOT DETECTED
MIDAZOLAM: NOT DETECTED
MORPHINE: NOT DETECTED
NORBUPRENORPHINE, FREE: NOT DETECTED
NORDIAZEPAM: NOT DETECTED
NORFENTANYL: NOT DETECTED
NORHYDROCODONE, URINE: NOT DETECTED
NOROXYCODONE: PRESENT
NOROXYMORPHONE, URINE: NOT DETECTED
OXAZEPAM: NOT DETECTED
OXYCODONE: PRESENT
OXYMORPHONE: NOT DETECTED
PAIN MANAGEMENT DRUG PANEL: NORMAL
PAIN MANAGEMENT DRUG PANEL: NORMAL
PCP: NOT DETECTED
PHENTERMINE: NOT DETECTED
PROPOXYPHENE: NOT DETECTED
TAPENTADOL, URINE: NOT DETECTED
TAPENTADOL-O-SULFATE, URINE: NOT DETECTED
TEMAZEPAM: NOT DETECTED
TRAMADOL: PRESENT
ZOLPIDEM: NOT DETECTED

## 2019-01-23 DIAGNOSIS — R10.32 LEFT GROIN PAIN: ICD-10-CM

## 2019-01-25 RX ORDER — TIZANIDINE 4 MG/1
TABLET ORAL
Qty: 270 TABLET | Refills: 5 | Status: SHIPPED | OUTPATIENT
Start: 2019-01-25 | End: 2019-06-13 | Stop reason: SDUPTHER

## 2019-02-15 ENCOUNTER — OFFICE VISIT (OUTPATIENT)
Dept: FAMILY MEDICINE CLINIC | Age: 54
End: 2019-02-15
Payer: MEDICARE

## 2019-02-15 VITALS
BODY MASS INDEX: 41.27 KG/M2 | HEART RATE: 99 BPM | WEIGHT: 294.8 LBS | DIASTOLIC BLOOD PRESSURE: 82 MMHG | OXYGEN SATURATION: 98 % | SYSTOLIC BLOOD PRESSURE: 160 MMHG | HEIGHT: 71 IN

## 2019-02-15 DIAGNOSIS — N13.5 IDIOPATHIC RETROPERITONEAL FIBROSIS: Primary | ICD-10-CM

## 2019-02-15 DIAGNOSIS — R10.32 LEFT GROIN PAIN: ICD-10-CM

## 2019-02-15 DIAGNOSIS — F41.9 ANXIETY: ICD-10-CM

## 2019-02-15 DIAGNOSIS — F33.41 DEPRESSION, MAJOR, RECURRENT, IN PARTIAL REMISSION (HCC): ICD-10-CM

## 2019-02-15 PROCEDURE — 99213 OFFICE O/P EST LOW 20 MIN: CPT | Performed by: FAMILY MEDICINE

## 2019-02-15 RX ORDER — CLONAZEPAM 2 MG/1
1-2 TABLET ORAL 2 TIMES DAILY PRN
Qty: 60 TABLET | Refills: 0 | Status: SHIPPED | OUTPATIENT
Start: 2019-02-15 | End: 2019-03-15 | Stop reason: SDUPTHER

## 2019-02-15 RX ORDER — CLONAZEPAM 2 MG/1
TABLET ORAL
Qty: 60 TABLET | Refills: 0 | Status: CANCELLED | OUTPATIENT
Start: 2019-02-15 | End: 2019-03-17

## 2019-02-15 RX ORDER — OXYCODONE HYDROCHLORIDE 20 MG/1
20 TABLET ORAL EVERY 6 HOURS PRN
Qty: 120 TABLET | Refills: 0 | Status: SHIPPED | OUTPATIENT
Start: 2019-02-15 | End: 2019-03-15 | Stop reason: SDUPTHER

## 2019-02-15 RX ORDER — TRAMADOL HYDROCHLORIDE 50 MG/1
TABLET ORAL
Qty: 240 TABLET | Refills: 0 | Status: CANCELLED | OUTPATIENT
Start: 2019-02-15 | End: 2019-03-17

## 2019-02-15 RX ORDER — TRAMADOL HYDROCHLORIDE 50 MG/1
100 TABLET ORAL EVERY 6 HOURS PRN
Qty: 240 TABLET | Refills: 0 | Status: SHIPPED | OUTPATIENT
Start: 2019-02-15 | End: 2019-03-15 | Stop reason: ALTCHOICE

## 2019-02-18 ASSESSMENT — ENCOUNTER SYMPTOMS
CONSTIPATION: 1
SHORTNESS OF BREATH: 0
ABDOMINAL PAIN: 1

## 2019-02-26 ASSESSMENT — ENCOUNTER SYMPTOMS
EYE PAIN: 0
CONSTIPATION: 1
ABDOMINAL PAIN: 1
COUGH: 1

## 2019-03-15 ENCOUNTER — OFFICE VISIT (OUTPATIENT)
Dept: FAMILY MEDICINE CLINIC | Age: 54
End: 2019-03-15
Payer: MEDICARE

## 2019-03-15 VITALS
SYSTOLIC BLOOD PRESSURE: 126 MMHG | OXYGEN SATURATION: 98 % | HEART RATE: 98 BPM | DIASTOLIC BLOOD PRESSURE: 62 MMHG | HEIGHT: 71 IN | WEIGHT: 292 LBS | BODY MASS INDEX: 40.88 KG/M2

## 2019-03-15 DIAGNOSIS — F43.21 GRIEF REACTION: ICD-10-CM

## 2019-03-15 DIAGNOSIS — F33.41 DEPRESSION, MAJOR, RECURRENT, IN PARTIAL REMISSION (HCC): ICD-10-CM

## 2019-03-15 DIAGNOSIS — N13.5 IDIOPATHIC RETROPERITONEAL FIBROSIS: Primary | ICD-10-CM

## 2019-03-15 DIAGNOSIS — R10.32 LEFT GROIN PAIN: ICD-10-CM

## 2019-03-15 DIAGNOSIS — F41.9 ANXIETY: ICD-10-CM

## 2019-03-15 PROCEDURE — 99213 OFFICE O/P EST LOW 20 MIN: CPT | Performed by: FAMILY MEDICINE

## 2019-03-15 RX ORDER — NALOXONE HYDROCHLORIDE 4 MG/.1ML
1 SPRAY NASAL PRN
Qty: 1 EACH | Refills: 5 | Status: SHIPPED | OUTPATIENT
Start: 2019-03-15

## 2019-03-15 RX ORDER — OXYCODONE HYDROCHLORIDE 20 MG/1
20 TABLET ORAL EVERY 6 HOURS PRN
Qty: 120 TABLET | Refills: 0 | Status: CANCELLED | OUTPATIENT
Start: 2019-03-15 | End: 2019-04-14

## 2019-03-15 RX ORDER — CLONAZEPAM 2 MG/1
1-2 TABLET ORAL 2 TIMES DAILY PRN
Qty: 60 TABLET | Refills: 0 | Status: SHIPPED | OUTPATIENT
Start: 2019-03-15 | End: 2019-04-15 | Stop reason: SDUPTHER

## 2019-03-15 RX ORDER — OXYCODONE HYDROCHLORIDE 20 MG/1
20 TABLET ORAL EVERY 6 HOURS PRN
Qty: 120 TABLET | Refills: 0 | Status: SHIPPED | OUTPATIENT
Start: 2019-03-15 | End: 2019-04-15 | Stop reason: SDUPTHER

## 2019-03-17 ASSESSMENT — ENCOUNTER SYMPTOMS
SHORTNESS OF BREATH: 0
ABDOMINAL PAIN: 1

## 2019-04-15 ENCOUNTER — OFFICE VISIT (OUTPATIENT)
Dept: FAMILY MEDICINE CLINIC | Age: 54
End: 2019-04-15
Payer: MEDICARE

## 2019-04-15 VITALS
HEIGHT: 71 IN | BODY MASS INDEX: 41.24 KG/M2 | DIASTOLIC BLOOD PRESSURE: 76 MMHG | HEART RATE: 94 BPM | WEIGHT: 294.6 LBS | SYSTOLIC BLOOD PRESSURE: 130 MMHG | OXYGEN SATURATION: 97 %

## 2019-04-15 DIAGNOSIS — E55.9 VITAMIN D DEFICIENCY: ICD-10-CM

## 2019-04-15 DIAGNOSIS — R10.32 LEFT GROIN PAIN: ICD-10-CM

## 2019-04-15 DIAGNOSIS — F41.9 ANXIETY: ICD-10-CM

## 2019-04-15 DIAGNOSIS — N13.5 IDIOPATHIC RETROPERITONEAL FIBROSIS: Primary | ICD-10-CM

## 2019-04-15 DIAGNOSIS — E03.9 HYPOTHYROIDISM, UNSPECIFIED TYPE: ICD-10-CM

## 2019-04-15 PROCEDURE — 99213 OFFICE O/P EST LOW 20 MIN: CPT | Performed by: FAMILY MEDICINE

## 2019-04-15 RX ORDER — OXYCODONE HYDROCHLORIDE 20 MG/1
20 TABLET ORAL EVERY 6 HOURS PRN
Qty: 120 TABLET | Refills: 0 | Status: SHIPPED | OUTPATIENT
Start: 2019-04-15 | End: 2019-05-14 | Stop reason: SDUPTHER

## 2019-04-15 RX ORDER — ERGOCALCIFEROL (VITAMIN D2) 1250 MCG
50000 CAPSULE ORAL
Qty: 8 CAPSULE | Refills: 5 | Status: SHIPPED | OUTPATIENT
Start: 2019-04-15 | End: 2020-01-03 | Stop reason: SDUPTHER

## 2019-04-15 RX ORDER — CLONAZEPAM 2 MG/1
1-2 TABLET ORAL 2 TIMES DAILY PRN
Qty: 60 TABLET | Refills: 0 | Status: SHIPPED | OUTPATIENT
Start: 2019-04-15 | End: 2019-06-13 | Stop reason: SDUPTHER

## 2019-04-15 NOTE — PROGRESS NOTES
Subjective:      Patient ID: Yong Mendez is a 46 y.o. male. HPI   Almost went to hospital 2-3 luciano past month, due to worsened L groing to mid belly pain, and L ear was throbbing too. Kept putting it off, and then eased, then would get worse again. Worse pain he's had from l ear for quite a while, no change to hearing (deaf in left ear), and no liquid either. No fever/chills either. BM's - about the same    Based on a Morphine Equivalent Dose of 80 or higher, the following issues were addressed:    Treatment Goal:  Minimize pain    Progress Toward Goal: no    Patient Satisfaction with Analgesia:  fair  Medication Side Effects: constipation    Functional Status:       Overall: Moderately impaired     ADLs:   - Eating:  Independent    - Bathing:  Independent   - Grooming:  Independent   - Dressing:  Independent   - Using the toilet:  Independent   - Walking:  Independent       Potential Aberrant Drug-Related Behavior:  None    Controlled Substances Monitoring:   OARRS     Date Narcotic Agreement Signed:  2013  Date of Last Urine Drug Screenin/15/19     Interpretation:  Stable/as expected      Review of Systems   Constitutional: Negative for chills and fatigue. HENT: Positive for ear pain (Recently at L ear, now resolved). Respiratory: Negative for shortness of breath. Cardiovascular: Negative for chest pain. Gastrointestinal: Positive for abdominal pain (L groin). Objective:   Physical Exam   Constitutional: He appears well-developed and well-nourished. Cardiovascular: Normal rate, regular rhythm and normal heart sounds. Pulmonary/Chest: Effort normal and breath sounds normal.   Abdominal: There is tenderness (definite at LLQ, chronic). Neurological: He is alert. Nursing note and vitals reviewed. Assessment:      Encounter Diagnoses   Name Primary?     Idiopathic retroperitoneal fibrosis Yes    Left groin pain     Anxiety     Hypothyroidism, unspecified type     Vitamin D deficiency          Plan:      Per orders - await results. Per patient somewhat rough month due to the amount of pain, though overall presently manageable. Reviewed low vitamin D, and since not taking any over-the-counter vitamin D, and level has been quite low, will try twice weekly prescription vitamin D as discussed. Plan repeat thyroid and vitamin D levels within the next 3 months or so. Repeat office visit in one month, sooner as needed.           Chelsy Gonzales MD

## 2019-04-16 ASSESSMENT — ENCOUNTER SYMPTOMS
ABDOMINAL PAIN: 1
SHORTNESS OF BREATH: 0

## 2019-05-14 ENCOUNTER — OFFICE VISIT (OUTPATIENT)
Dept: FAMILY MEDICINE CLINIC | Age: 54
End: 2019-05-14
Payer: MEDICARE

## 2019-05-14 VITALS
WEIGHT: 291.2 LBS | HEIGHT: 71 IN | OXYGEN SATURATION: 98 % | DIASTOLIC BLOOD PRESSURE: 62 MMHG | BODY MASS INDEX: 40.77 KG/M2 | SYSTOLIC BLOOD PRESSURE: 120 MMHG | HEART RATE: 102 BPM

## 2019-05-14 DIAGNOSIS — N13.5 IDIOPATHIC RETROPERITONEAL FIBROSIS: ICD-10-CM

## 2019-05-14 DIAGNOSIS — F41.9 ANXIETY: ICD-10-CM

## 2019-05-14 DIAGNOSIS — R10.32 LEFT GROIN PAIN: ICD-10-CM

## 2019-05-14 PROCEDURE — 99213 OFFICE O/P EST LOW 20 MIN: CPT | Performed by: FAMILY MEDICINE

## 2019-05-14 RX ORDER — CLONAZEPAM 2 MG/1
TABLET ORAL
Qty: 60 TABLET | Refills: 0 | Status: SHIPPED | OUTPATIENT
Start: 2019-05-14 | End: 2019-12-06 | Stop reason: SDUPTHER

## 2019-05-14 RX ORDER — OXYCODONE HYDROCHLORIDE 20 MG/1
20 TABLET ORAL EVERY 6 HOURS PRN
Qty: 120 TABLET | Refills: 0 | Status: SHIPPED | OUTPATIENT
Start: 2019-05-14 | End: 2019-06-13 | Stop reason: SDUPTHER

## 2019-05-14 NOTE — PROGRESS NOTES
Subjective:      Patient ID: Bc Blank is a 46 y.o. male. HPI   FU for pain - decent day today. No major changes to his constipation. Son works at a [employer]out/[employer] through. Based on a Morphine Equivalent Dose of 80 or higher, the following issues were addressed:    Treatment Goal:  Minimize pain    Progress Toward Goal: no    Patient Satisfaction with Analgesia:  fair  Medication Side Effects: constipation    Functional Status:       Overall: Moderately impaired     ADLs:   - Eating:  Independent    - Bathing:  Independent   - Grooming:  Independent   - Dressing:  Independent   - Using the toilet:  Independent   - Walking:  Independent       Potential Aberrant Drug-Related Behavior: None    Controlled Substances Monitoring:   OARRS     Date Narcotic Agreement Signed:  2013  Date of Last Urine Drug Screenin/15/19     Interpretation:  Stable/as expected      Review of Systems   Constitutional: Positive for fatigue. Negative for chills and fever. Respiratory: Negative for shortness of breath. Cardiovascular: Negative for chest pain. Gastrointestinal: Positive for abdominal pain (L groin, chronic). Musculoskeletal: Positive for gait problem. Psychiatric/Behavioral: Positive for dysphoric mood. Negative for self-injury and suicidal ideas. The patient is nervous/anxious. Objective:   Physical Exam   Constitutional: He appears well-developed and well-nourished. HENT:   Head: Normocephalic and atraumatic. Neck: Neck supple. Cardiovascular: Normal rate, regular rhythm and normal heart sounds. Pulmonary/Chest: Effort normal and breath sounds normal.   Lymphadenopathy:     He has no cervical adenopathy. Neurological: He is alert. Nursing note and vitals reviewed. Assessment:      Encounter Diagnoses   Name Primary?  Idiopathic retroperitoneal fibrosis     Left groin pain     Anxiety            Plan:      Per orders - await results.   Idiopathic retroperitoneal fibrosis and left groin pain both essentially unchanged, continue present medication. Still ideally want to slowly decrease the dose, since still definitely high.   Anxiety today overall stable, return today          Danya Bhakta MD

## 2019-06-12 ASSESSMENT — ENCOUNTER SYMPTOMS
ABDOMINAL PAIN: 1
SHORTNESS OF BREATH: 0

## 2019-06-13 ENCOUNTER — OFFICE VISIT (OUTPATIENT)
Dept: FAMILY MEDICINE CLINIC | Age: 54
End: 2019-06-13
Payer: MEDICARE

## 2019-06-13 VITALS
SYSTOLIC BLOOD PRESSURE: 110 MMHG | DIASTOLIC BLOOD PRESSURE: 64 MMHG | HEIGHT: 71 IN | OXYGEN SATURATION: 98 % | BODY MASS INDEX: 41.13 KG/M2 | HEART RATE: 96 BPM | WEIGHT: 293.8 LBS

## 2019-06-13 DIAGNOSIS — R10.32 LEFT GROIN PAIN: ICD-10-CM

## 2019-06-13 DIAGNOSIS — F41.9 ANXIETY: ICD-10-CM

## 2019-06-13 DIAGNOSIS — N13.5 IDIOPATHIC RETROPERITONEAL FIBROSIS: Primary | ICD-10-CM

## 2019-06-13 PROCEDURE — 99213 OFFICE O/P EST LOW 20 MIN: CPT | Performed by: FAMILY MEDICINE

## 2019-06-13 RX ORDER — TIZANIDINE 4 MG/1
TABLET ORAL
Qty: 270 TABLET | Refills: 5 | Status: SHIPPED | OUTPATIENT
Start: 2019-06-13 | End: 2020-01-03 | Stop reason: SDUPTHER

## 2019-06-13 RX ORDER — OXYCODONE HYDROCHLORIDE 20 MG/1
20 TABLET ORAL EVERY 6 HOURS PRN
Qty: 120 TABLET | Refills: 0 | Status: SHIPPED | OUTPATIENT
Start: 2019-06-13 | End: 2019-07-08 | Stop reason: SDUPTHER

## 2019-06-13 RX ORDER — CLONAZEPAM 2 MG/1
1-2 TABLET ORAL 2 TIMES DAILY PRN
Qty: 60 TABLET | Refills: 0 | Status: SHIPPED | OUTPATIENT
Start: 2019-06-13 | End: 2019-07-15 | Stop reason: SDUPTHER

## 2019-06-13 NOTE — PROGRESS NOTES
Idiopathic retroperitoneal fibrosis probably stable, though pain somewhat worse recently. Anxiety overall stable, continue present medication. If overall doing okay, then repeat office visit in 4 weeks, sooner as needed. Defer labs today, consider check labs next month.           Luther aMrtin MD

## 2019-06-25 ASSESSMENT — ENCOUNTER SYMPTOMS
ABDOMINAL PAIN: 1
SHORTNESS OF BREATH: 0

## 2019-06-25 NOTE — PATIENT INSTRUCTIONS
Idiopathic retroperitoneal fibrosis probably stable, though pain somewhat worse recently due to taking last few pills a little bit sooner than recommended. Anxiety overall stable, continue present medication. If overall doing okay, then repeat office visit in 4 weeks, sooner as needed. Defer labs today, consider check labs next month.

## 2019-07-08 DIAGNOSIS — F33.41 DEPRESSION, MAJOR, RECURRENT, IN PARTIAL REMISSION (HCC): ICD-10-CM

## 2019-07-08 DIAGNOSIS — N13.5 IDIOPATHIC RETROPERITONEAL FIBROSIS: ICD-10-CM

## 2019-07-08 DIAGNOSIS — R10.32 LEFT GROIN PAIN: ICD-10-CM

## 2019-07-08 DIAGNOSIS — F41.9 ANXIETY: ICD-10-CM

## 2019-07-10 RX ORDER — OXYCODONE HYDROCHLORIDE 20 MG/1
20 TABLET ORAL EVERY 6 HOURS PRN
Qty: 120 TABLET | Refills: 0 | Status: SHIPPED | OUTPATIENT
Start: 2019-07-10 | End: 2019-08-09 | Stop reason: SDUPTHER

## 2019-07-10 RX ORDER — QUETIAPINE FUMARATE 400 MG/1
TABLET, FILM COATED ORAL
Qty: 60 TABLET | Refills: 5 | Status: SHIPPED | OUTPATIENT
Start: 2019-07-10 | End: 2020-01-03 | Stop reason: SDUPTHER

## 2019-07-10 NOTE — TELEPHONE ENCOUNTER
Rx(s) written and taken up front. Nurse - Per patient request, please let him know his prescriptions are ready for pickup.

## 2019-07-15 ENCOUNTER — OFFICE VISIT (OUTPATIENT)
Dept: FAMILY MEDICINE CLINIC | Age: 54
End: 2019-07-15
Payer: MEDICARE

## 2019-07-15 VITALS
BODY MASS INDEX: 41.38 KG/M2 | OXYGEN SATURATION: 95 % | HEART RATE: 94 BPM | WEIGHT: 295.6 LBS | HEIGHT: 71 IN | DIASTOLIC BLOOD PRESSURE: 76 MMHG | SYSTOLIC BLOOD PRESSURE: 122 MMHG

## 2019-07-15 DIAGNOSIS — F41.9 ANXIETY: ICD-10-CM

## 2019-07-15 DIAGNOSIS — F17.200 TOBACCO USE DISORDER: ICD-10-CM

## 2019-07-15 DIAGNOSIS — F33.41 DEPRESSION, MAJOR, RECURRENT, IN PARTIAL REMISSION (HCC): ICD-10-CM

## 2019-07-15 DIAGNOSIS — N13.5 IDIOPATHIC RETROPERITONEAL FIBROSIS: Primary | ICD-10-CM

## 2019-07-15 PROCEDURE — 99213 OFFICE O/P EST LOW 20 MIN: CPT | Performed by: FAMILY MEDICINE

## 2019-07-15 RX ORDER — CLONAZEPAM 2 MG/1
1-2 TABLET ORAL 2 TIMES DAILY PRN
Qty: 60 TABLET | Refills: 0 | Status: SHIPPED | OUTPATIENT
Start: 2019-07-15 | End: 2019-08-09 | Stop reason: SDUPTHER

## 2019-07-15 NOTE — PROGRESS NOTES
Subjective:      Patient ID: Renee Singh is a 46 y.o. male. HPI   FU for IPF, fair amount of pain lately (L groin area). Deidra Singh son to urgent care for conjuncitivits and cough, ? Pneumonia. Milld diarrhea at times. Tobacco unchanged. Based on a Morphine Equivalent Dose of 80 or higher, the following issues were addressed:    Treatment Goal: Minimize pain    Progress Toward Goal: no    Patient Satisfaction with Analgesia:  fair  Medication Side Effects: constipation    Functional Status:       Overall: Moderately impaired     ADLs:   - Eating:  Independent    - Bathing:  Independent   - Grooming:  Independent   - Dressing:  Independent   - Using the toilet:  Independent   - Walking:  Independent       Potential Aberrant Drug-Related Behavior:  None    Controlled Substances Monitoring:   OARRS     Date Narcotic Agreement Signed:  2013  Date of Last Urine Drug Screenin/15/19     Interpretation:  Stable as expected. Review of Systems   Constitutional: Negative for chills, fatigue and fever. Respiratory: Negative for shortness of breath. Cardiovascular: Negative for palpitations and leg swelling. Gastrointestinal: Positive for abdominal pain (L groin, persisting). Negative for diarrhea. Psychiatric/Behavioral: Positive for dysphoric mood (mild). The patient is nervous/anxious (mild). Objective:   Physical Exam   Constitutional: He appears well-developed and well-nourished. HENT:   Head: Normocephalic and atraumatic. Mouth/Throat: Oropharynx is clear and moist.   Cardiovascular: Normal rate, regular rhythm and normal heart sounds. Pulmonary/Chest: Effort normal and breath sounds normal.   Neurological: He is alert. Nursing note and vitals reviewed. Assessment:      Encounter Diagnoses   Name Primary?     Idiopathic retroperitoneal fibrosis Yes    Depression, major, recurrent, in partial remission ([facility] [address]     Anxiety     Tobacco use disorder            Plan: Per orders - await results. Idiopathic retroperitoneal fibrosis apparently stable, continue present medications. Stop tobacco when ready, though no plans to stop in the immediate future. Depression and anxiety overall stable, continue present medications. If overall doing okay, then repeat office visit in 1 month, sooner as needed.           Neeta Contreras MD

## 2019-07-16 ASSESSMENT — ENCOUNTER SYMPTOMS
ABDOMINAL PAIN: 1
DIARRHEA: 0
SHORTNESS OF BREATH: 0

## 2019-07-16 NOTE — PATIENT INSTRUCTIONS
Idiopathic retroperitoneal fibrosis apparently stable, continue present medications. Stop tobacco when ready, though no plan to stop in the immediate future. Depression and anxiety overall stable, continue present medications. If overall doing okay, then repeat office visit in 1 month, sooner as needed.

## 2019-07-29 NOTE — PROGRESS NOTES
normal. No respiratory distress. Abdominal: Soft. He exhibits no distension. There is tenderness (at least mild - moderate TTP at/over left groin). Neurological: He is alert. Psychiatric: His speech is normal and behavior is normal. Thought content normal. His mood appears anxious (mils). He exhibits a depressed mood (mild). Nursing note and vitals reviewed. Assessment:      Encounter Diagnoses   Name Primary?  Idiopathic retroperitoneal fibrosis Yes    Left groin pain     Anxiety     Decreased glomerular filtration rate (GFR)     Elevated TSH     Elevated glucose     Vitamin D deficiency     Mixed hyperlipidemia     Leukocytosis, unspecified type          Plan:      Per orders - await results. Pain overall stable, continue present meds. Still suggest followup with pain doc/clinic, patient defers at present. To fill out papers as requested. Stop smoking when ready, though realize present stress not conducive to stopping in near future. FU OV in one month, sooner as needed.
patient representative/patient

## 2019-08-09 ENCOUNTER — OFFICE VISIT (OUTPATIENT)
Dept: FAMILY MEDICINE CLINIC | Age: 54
End: 2019-08-09
Payer: MEDICARE

## 2019-08-09 VITALS
SYSTOLIC BLOOD PRESSURE: 138 MMHG | OXYGEN SATURATION: 97 % | WEIGHT: 293.2 LBS | HEIGHT: 71 IN | DIASTOLIC BLOOD PRESSURE: 82 MMHG | HEART RATE: 94 BPM | BODY MASS INDEX: 41.05 KG/M2

## 2019-08-09 DIAGNOSIS — N13.30 HYDRONEPHROSIS OF RIGHT KIDNEY: ICD-10-CM

## 2019-08-09 DIAGNOSIS — R10.32 LEFT GROIN PAIN: ICD-10-CM

## 2019-08-09 DIAGNOSIS — F41.9 ANXIETY: ICD-10-CM

## 2019-08-09 DIAGNOSIS — R73.09 ELEVATED GLUCOSE: ICD-10-CM

## 2019-08-09 DIAGNOSIS — E78.2 MIXED HYPERLIPIDEMIA: ICD-10-CM

## 2019-08-09 DIAGNOSIS — N13.5 IDIOPATHIC RETROPERITONEAL FIBROSIS: Primary | ICD-10-CM

## 2019-08-09 DIAGNOSIS — E03.9 HYPOTHYROIDISM, UNSPECIFIED TYPE: ICD-10-CM

## 2019-08-09 DIAGNOSIS — Z12.5 PROSTATE CANCER SCREENING: ICD-10-CM

## 2019-08-09 DIAGNOSIS — E55.9 VITAMIN D DEFICIENCY: ICD-10-CM

## 2019-08-09 LAB
BILIRUBIN, POC: 0
BLOOD URINE, POC: 0
CLARITY, POC: CLEAR
COLOR, POC: NORMAL
GLUCOSE URINE, POC: 0
KETONES, POC: 0
LEUKOCYTE EST, POC: 0
NITRITE, POC: 0
PH, POC: 5.5
PROTEIN, POC: 0
SPECIFIC GRAVITY, POC: 1.02
UROBILINOGEN, POC: 1

## 2019-08-09 PROCEDURE — 36415 COLL VENOUS BLD VENIPUNCTURE: CPT | Performed by: FAMILY MEDICINE

## 2019-08-09 PROCEDURE — 99213 OFFICE O/P EST LOW 20 MIN: CPT | Performed by: FAMILY MEDICINE

## 2019-08-09 PROCEDURE — 81002 URINALYSIS NONAUTO W/O SCOPE: CPT | Performed by: FAMILY MEDICINE

## 2019-08-09 RX ORDER — OXYCODONE HYDROCHLORIDE 20 MG/1
20 TABLET ORAL EVERY 6 HOURS PRN
Qty: 120 TABLET | Refills: 0 | Status: SHIPPED | OUTPATIENT
Start: 2019-08-09 | End: 2019-08-09 | Stop reason: SDUPTHER

## 2019-08-09 RX ORDER — OXYCODONE HYDROCHLORIDE 20 MG/1
20 TABLET ORAL EVERY 6 HOURS PRN
Qty: 120 TABLET | Refills: 0 | Status: SHIPPED | OUTPATIENT
Start: 2019-08-09 | End: 2019-09-09 | Stop reason: SDUPTHER

## 2019-08-09 RX ORDER — CLONAZEPAM 2 MG/1
1-2 TABLET ORAL 2 TIMES DAILY PRN
Qty: 60 TABLET | Refills: 0 | Status: SHIPPED | OUTPATIENT
Start: 2019-08-09 | End: 2019-09-09 | Stop reason: SDUPTHER

## 2019-08-09 NOTE — PROGRESS NOTES
Subjective:      Patient ID: Vick Riley is a 53 y.o. male. HPI   FU for ideopathic retroperitoneal fibrosis (IRPF) - quite a bit of pain today, about 8-9/10 pain, out of meds y-day. Oneta Ducking got better, seeing out of both eyes. Son Jay Del Real) needs to get another job, smoking and buying pop for him. Patient still smoking 1 PPD, no recent change, and no plans to stop in the near future. No meds missing. Depression about the same, no significant changes. OK to check PSA too - no recent changes to urination. Review of Systems   Constitutional: Negative for chills and fatigue. Respiratory: Negative for shortness of breath. Cardiovascular: Negative for chest pain. Gastrointestinal: Positive for abdominal pain (L groin, chronic, usual location, though severity somewhat worse, see HPI). Objective:   Physical Exam   Constitutional: He appears well-developed and well-nourished. Cardiovascular: Normal rate, regular rhythm and normal heart sounds. Pulmonary/Chest: Effort normal and breath sounds normal.   Neurological: He is alert. Psychiatric: His speech is normal and behavior is normal. He exhibits a depressed mood (mild). Nursing note and vitals reviewed. Assessment:      Encounter Diagnoses   Name Primary?  Idiopathic retroperitoneal fibrosis Yes    Left groin pain     Anxiety     Hypothyroidism, unspecified type     Mixed hyperlipidemia     Elevated glucose     Vitamin D deficiency     Hydronephrosis of right kidney     Prostate cancer screening            Plan:      Per orders - await results. IRPF probably stable, but to be cautious, will check labs. Pain somewhat worse lately, but apparently due to discrepancy between when patient thought could get pain meds refilled and pharmacy's dates for refill - per OARRS, looks like he has run out a little early. If labs stable, and overall doing ok, then repeat OV in one month, sooner as needed.  (OARRS was run and reviewed personally by me, Jama Olivarez Mainor, on 08/11/19, and no signs or suspicion of diversion or other abuse.)  Monitor BP's - likely high due to increased pain today.              Huy Louis MD

## 2019-08-10 LAB
A/G RATIO: 1.3 (ref 1.1–2.2)
ALBUMIN SERPL-MCNC: 4.8 G/DL (ref 3.4–5)
ALP BLD-CCNC: 113 U/L (ref 40–129)
ALT SERPL-CCNC: 13 U/L (ref 10–40)
ANION GAP SERPL CALCULATED.3IONS-SCNC: 17 MMOL/L (ref 3–16)
AST SERPL-CCNC: 16 U/L (ref 15–37)
BILIRUB SERPL-MCNC: 0.3 MG/DL (ref 0–1)
BUN BLDV-MCNC: 13 MG/DL (ref 7–20)
CALCIUM SERPL-MCNC: 10.2 MG/DL (ref 8.3–10.6)
CHLORIDE BLD-SCNC: 99 MMOL/L (ref 99–110)
CHOLESTEROL, TOTAL: 211 MG/DL (ref 0–199)
CO2: 24 MMOL/L (ref 21–32)
CREAT SERPL-MCNC: 1 MG/DL (ref 0.9–1.3)
ESTIMATED AVERAGE GLUCOSE: 134.1 MG/DL
GFR AFRICAN AMERICAN: >60
GFR NON-AFRICAN AMERICAN: >60
GLOBULIN: 3.6 G/DL
GLUCOSE BLD-MCNC: 117 MG/DL (ref 70–99)
HBA1C MFR BLD: 6.3 %
HDLC SERPL-MCNC: 35 MG/DL (ref 40–60)
LDL CHOLESTEROL CALCULATED: ABNORMAL MG/DL
LDL CHOLESTEROL DIRECT: 109 MG/DL
POTASSIUM SERPL-SCNC: 4.5 MMOL/L (ref 3.5–5.1)
PROSTATE SPECIFIC ANTIGEN: 1.28 NG/ML (ref 0–4)
SODIUM BLD-SCNC: 140 MMOL/L (ref 136–145)
T4 FREE: 0.7 NG/DL (ref 0.9–1.8)
TOTAL PROTEIN: 8.4 G/DL (ref 6.4–8.2)
TRIGL SERPL-MCNC: 418 MG/DL (ref 0–150)
TSH SERPL DL<=0.05 MIU/L-ACNC: 1.71 UIU/ML (ref 0.27–4.2)
VITAMIN D 25-HYDROXY: 26.8 NG/ML
VLDLC SERPL CALC-MCNC: ABNORMAL MG/DL

## 2019-08-11 ASSESSMENT — ENCOUNTER SYMPTOMS
SHORTNESS OF BREATH: 0
ABDOMINAL PAIN: 1

## 2019-08-21 DIAGNOSIS — E03.9 HYPOTHYROIDISM, UNSPECIFIED TYPE: ICD-10-CM

## 2019-08-22 RX ORDER — LEVOTHYROXINE SODIUM 88 UG/1
TABLET ORAL
Qty: 30 TABLET | Refills: 12 | Status: SHIPPED | OUTPATIENT
Start: 2019-08-22 | End: 2020-05-06 | Stop reason: SDUPTHER

## 2019-08-22 NOTE — TELEPHONE ENCOUNTER
Done.    Nurse - please let patient know that we increased his dose of the thyroid medication slightly, from 75 mcg daily to 88 mcg daily, so if the color looks somewhat different, there is a reason why. Call if he has any questions.

## 2019-09-09 ENCOUNTER — OFFICE VISIT (OUTPATIENT)
Dept: FAMILY MEDICINE CLINIC | Age: 54
End: 2019-09-09
Payer: MEDICARE

## 2019-09-09 VITALS
SYSTOLIC BLOOD PRESSURE: 122 MMHG | DIASTOLIC BLOOD PRESSURE: 70 MMHG | OXYGEN SATURATION: 98 % | HEIGHT: 71 IN | WEIGHT: 294 LBS | BODY MASS INDEX: 41.16 KG/M2 | HEART RATE: 96 BPM

## 2019-09-09 DIAGNOSIS — R10.32 LEFT GROIN PAIN: ICD-10-CM

## 2019-09-09 DIAGNOSIS — F41.9 ANXIETY: ICD-10-CM

## 2019-09-09 DIAGNOSIS — F17.200 TOBACCO USE DISORDER: ICD-10-CM

## 2019-09-09 DIAGNOSIS — N13.5 IDIOPATHIC RETROPERITONEAL FIBROSIS: Primary | ICD-10-CM

## 2019-09-09 PROCEDURE — 99213 OFFICE O/P EST LOW 20 MIN: CPT | Performed by: FAMILY MEDICINE

## 2019-09-09 RX ORDER — OXYCODONE HYDROCHLORIDE 20 MG/1
20 TABLET ORAL EVERY 6 HOURS PRN
Qty: 120 TABLET | Refills: 0 | Status: SHIPPED | OUTPATIENT
Start: 2019-09-09 | End: 2019-10-08 | Stop reason: SDUPTHER

## 2019-09-09 RX ORDER — CLONAZEPAM 2 MG/1
1-2 TABLET ORAL 2 TIMES DAILY PRN
Qty: 60 TABLET | Refills: 0 | Status: SHIPPED | OUTPATIENT
Start: 2019-09-09 | End: 2019-10-08 | Stop reason: SDUPTHER

## 2019-09-26 ASSESSMENT — ENCOUNTER SYMPTOMS
CONSTIPATION: 1
SHORTNESS OF BREATH: 0
ABDOMINAL PAIN: 1

## 2019-10-01 RX ORDER — FENOFIBRATE 160 MG/1
160 TABLET ORAL DAILY
Qty: 90 TABLET | Refills: 1 | Status: SHIPPED | OUTPATIENT
Start: 2019-10-01 | End: 2020-05-06 | Stop reason: SDUPTHER

## 2019-10-08 ENCOUNTER — OFFICE VISIT (OUTPATIENT)
Dept: FAMILY MEDICINE CLINIC | Age: 54
End: 2019-10-08
Payer: MEDICARE

## 2019-10-08 VITALS
HEIGHT: 71 IN | HEART RATE: 86 BPM | OXYGEN SATURATION: 98 % | WEIGHT: 293 LBS | SYSTOLIC BLOOD PRESSURE: 100 MMHG | DIASTOLIC BLOOD PRESSURE: 60 MMHG | BODY MASS INDEX: 41.02 KG/M2

## 2019-10-08 DIAGNOSIS — N13.5 IDIOPATHIC RETROPERITONEAL FIBROSIS: Primary | ICD-10-CM

## 2019-10-08 DIAGNOSIS — F17.200 TOBACCO USE DISORDER: ICD-10-CM

## 2019-10-08 DIAGNOSIS — E55.9 VITAMIN D DEFICIENCY: ICD-10-CM

## 2019-10-08 DIAGNOSIS — R10.32 LEFT GROIN PAIN: ICD-10-CM

## 2019-10-08 DIAGNOSIS — F41.9 ANXIETY: ICD-10-CM

## 2019-10-08 PROCEDURE — 99213 OFFICE O/P EST LOW 20 MIN: CPT | Performed by: FAMILY MEDICINE

## 2019-10-08 RX ORDER — CLONAZEPAM 2 MG/1
1-2 TABLET ORAL 2 TIMES DAILY PRN
Qty: 60 TABLET | Refills: 0 | Status: SHIPPED | OUTPATIENT
Start: 2019-10-08 | End: 2019-11-07 | Stop reason: SDUPTHER

## 2019-10-08 RX ORDER — OXYCODONE HYDROCHLORIDE 20 MG/1
20 TABLET ORAL EVERY 6 HOURS PRN
Qty: 120 TABLET | Refills: 0 | Status: SHIPPED | OUTPATIENT
Start: 2019-10-08 | End: 2019-11-07

## 2019-10-08 RX ORDER — OXYCODONE AND ACETAMINOPHEN 10; 325 MG/1; MG/1
1-2 TABLET ORAL EVERY 6 HOURS PRN
Qty: 240 TABLET | Refills: 0 | Status: SHIPPED | OUTPATIENT
Start: 2019-10-08 | End: 2019-11-07 | Stop reason: SDUPTHER

## 2019-10-10 ASSESSMENT — ENCOUNTER SYMPTOMS
ABDOMINAL PAIN: 1
SHORTNESS OF BREATH: 0

## 2019-11-07 ENCOUNTER — OFFICE VISIT (OUTPATIENT)
Dept: FAMILY MEDICINE CLINIC | Age: 54
End: 2019-11-07
Payer: MEDICARE

## 2019-11-07 VITALS
DIASTOLIC BLOOD PRESSURE: 80 MMHG | HEIGHT: 71 IN | BODY MASS INDEX: 41.22 KG/M2 | WEIGHT: 294.4 LBS | HEART RATE: 86 BPM | OXYGEN SATURATION: 99 % | SYSTOLIC BLOOD PRESSURE: 140 MMHG

## 2019-11-07 DIAGNOSIS — Z23 NEEDS FLU SHOT: ICD-10-CM

## 2019-11-07 DIAGNOSIS — R10.32 LEFT GROIN PAIN: ICD-10-CM

## 2019-11-07 DIAGNOSIS — F41.9 ANXIETY: ICD-10-CM

## 2019-11-07 DIAGNOSIS — N13.5 IDIOPATHIC RETROPERITONEAL FIBROSIS: Primary | ICD-10-CM

## 2019-11-07 PROCEDURE — 99213 OFFICE O/P EST LOW 20 MIN: CPT | Performed by: FAMILY MEDICINE

## 2019-11-07 PROCEDURE — G0008 ADMIN INFLUENZA VIRUS VAC: HCPCS | Performed by: FAMILY MEDICINE

## 2019-11-07 PROCEDURE — 90686 IIV4 VACC NO PRSV 0.5 ML IM: CPT | Performed by: FAMILY MEDICINE

## 2019-11-07 RX ORDER — OXYCODONE AND ACETAMINOPHEN 10; 325 MG/1; MG/1
1-2 TABLET ORAL EVERY 6 HOURS PRN
Qty: 240 TABLET | Refills: 0 | Status: SHIPPED | OUTPATIENT
Start: 2019-11-07 | End: 2019-12-06 | Stop reason: SDUPTHER

## 2019-11-07 RX ORDER — CLONAZEPAM 2 MG/1
1-2 TABLET ORAL 2 TIMES DAILY PRN
Qty: 60 TABLET | Refills: 0 | Status: SHIPPED | OUTPATIENT
Start: 2019-11-07 | End: 2019-12-06 | Stop reason: SDUPTHER

## 2019-11-07 RX ORDER — OXYCODONE HYDROCHLORIDE 20 MG/1
20 TABLET ORAL EVERY 6 HOURS PRN
Qty: 120 TABLET | Refills: 0 | Status: CANCELLED | OUTPATIENT
Start: 2019-11-07 | End: 2019-12-07

## 2019-12-06 ENCOUNTER — OFFICE VISIT (OUTPATIENT)
Dept: FAMILY MEDICINE CLINIC | Age: 54
End: 2019-12-06
Payer: MEDICARE

## 2019-12-06 DIAGNOSIS — F41.9 ANXIETY: ICD-10-CM

## 2019-12-06 DIAGNOSIS — N13.5 IDIOPATHIC RETROPERITONEAL FIBROSIS: Primary | ICD-10-CM

## 2019-12-06 DIAGNOSIS — F17.200 TOBACCO USE DISORDER: ICD-10-CM

## 2019-12-06 DIAGNOSIS — R10.32 LEFT GROIN PAIN: ICD-10-CM

## 2019-12-06 PROCEDURE — 99213 OFFICE O/P EST LOW 20 MIN: CPT | Performed by: FAMILY MEDICINE

## 2019-12-06 RX ORDER — CLONAZEPAM 2 MG/1
1-2 TABLET ORAL 2 TIMES DAILY PRN
Qty: 60 TABLET | Refills: 0 | Status: SHIPPED | OUTPATIENT
Start: 2019-12-06 | End: 2020-01-03 | Stop reason: SDUPTHER

## 2019-12-06 RX ORDER — OXYCODONE AND ACETAMINOPHEN 10; 325 MG/1; MG/1
1-2 TABLET ORAL EVERY 6 HOURS PRN
Qty: 240 TABLET | Refills: 0 | Status: SHIPPED | OUTPATIENT
Start: 2019-12-06 | End: 2020-01-03 | Stop reason: SDUPTHER

## 2019-12-08 VITALS
HEART RATE: 98 BPM | SYSTOLIC BLOOD PRESSURE: 142 MMHG | BODY MASS INDEX: 41.02 KG/M2 | DIASTOLIC BLOOD PRESSURE: 70 MMHG | WEIGHT: 293 LBS | HEIGHT: 71 IN | OXYGEN SATURATION: 100 %

## 2019-12-09 ASSESSMENT — ENCOUNTER SYMPTOMS
DIARRHEA: 0
SHORTNESS OF BREATH: 0
ABDOMINAL PAIN: 1
BACK PAIN: 0
ABDOMINAL PAIN: 1
CONSTIPATION: 1
SHORTNESS OF BREATH: 0

## 2020-01-03 ENCOUNTER — OFFICE VISIT (OUTPATIENT)
Dept: FAMILY MEDICINE CLINIC | Age: 55
End: 2020-01-03
Payer: MEDICARE

## 2020-01-03 VITALS
WEIGHT: 293.2 LBS | SYSTOLIC BLOOD PRESSURE: 120 MMHG | HEIGHT: 71 IN | OXYGEN SATURATION: 98 % | HEART RATE: 112 BPM | BODY MASS INDEX: 41.05 KG/M2 | DIASTOLIC BLOOD PRESSURE: 68 MMHG

## 2020-01-03 PROCEDURE — 99213 OFFICE O/P EST LOW 20 MIN: CPT | Performed by: FAMILY MEDICINE

## 2020-01-03 RX ORDER — TIZANIDINE 4 MG/1
TABLET ORAL
Qty: 270 TABLET | Refills: 5 | Status: SHIPPED | OUTPATIENT
Start: 2020-01-03 | End: 2020-05-06 | Stop reason: SDUPTHER

## 2020-01-03 RX ORDER — QUETIAPINE FUMARATE 400 MG/1
TABLET, FILM COATED ORAL
Qty: 60 TABLET | Refills: 5 | Status: SHIPPED | OUTPATIENT
Start: 2020-01-03 | End: 2020-05-06 | Stop reason: SDUPTHER

## 2020-01-03 RX ORDER — OXYCODONE AND ACETAMINOPHEN 10; 325 MG/1; MG/1
1-2 TABLET ORAL EVERY 6 HOURS PRN
Qty: 240 TABLET | Refills: 0 | Status: SHIPPED | OUTPATIENT
Start: 2020-01-03 | End: 2020-01-22 | Stop reason: SDUPTHER

## 2020-01-03 RX ORDER — CLONAZEPAM 2 MG/1
1-2 TABLET ORAL 2 TIMES DAILY PRN
Qty: 60 TABLET | Refills: 0 | Status: SHIPPED | OUTPATIENT
Start: 2020-01-03 | End: 2020-01-22 | Stop reason: SDUPTHER

## 2020-01-03 RX ORDER — ERGOCALCIFEROL (VITAMIN D2) 1250 MCG
50000 CAPSULE ORAL
Qty: 8 CAPSULE | Refills: 5 | Status: SHIPPED | OUTPATIENT
Start: 2020-01-06 | End: 2020-08-24 | Stop reason: SDUPTHER

## 2020-01-03 NOTE — PROGRESS NOTES
Breath sounds: Normal breath sounds. Abdominal:      Tenderness: There is tenderness (definite TTP L groin, no obvious change from usual). Musculoskeletal:         General: Swelling (essentially entire L leg is mild-moderately swollen, minimal pitting,) present. Neurological:      Mental Status: He is alert. Psychiatric:         Mood and Affect: Mood is anxious (mild) and depressed (mild). Speech: Speech normal.         Behavior: Behavior normal.         Thought Content: Thought content normal.         Assessment:      Encounter Diagnoses   Name Primary?  Left leg swelling Yes    Idiopathic retroperitoneal fibrosis     Left groin pain     Anxiety     Depression, major, recurrent, in partial remission (HCC)     Vitamin D deficiency            Plan:      Per orders. As mentioned above, safest way to proceed is to go to ER, though patient presently defers - is definitely by medical advise to take him to the ER/ED, patient presently refuses, and is aware delaying ER evaluation is possibly life-threatening. Call 911 if any sudden/significant SOB out of the blue.                Itzel Lee MD

## 2020-01-06 ASSESSMENT — ENCOUNTER SYMPTOMS
SHORTNESS OF BREATH: 0
ABDOMINAL PAIN: 1
COUGH: 0

## 2020-01-06 NOTE — PATIENT INSTRUCTIONS
Strongly advise you go the the emergency room/department due to your significant (entire) left leg swelling, since high suspicion of deep blood clot, which can be life-threatening if it is not treated appropriately/in a timely manner. I am aware you are choosing NOT to be evaluated (at an ER) at this time - strongly advise you consider changing your mind! Call 911 if any sudden shortness of breath or similar symptoms. If leg is evaluated/treated appropriately, and overall swelling/pain eased, and otherwise doing ok, then repeat office visit in 1 month, sooner as needed. (Follow up with pain physician as recommended and per several names you were given.)  Also will need new primary care physician, since I am leaving [facility] in about 2 weeks.

## 2020-01-22 ENCOUNTER — TELEPHONE (OUTPATIENT)
Dept: FAMILY MEDICINE CLINIC | Age: 55
End: 2020-01-22

## 2020-01-22 NOTE — TELEPHONE ENCOUNTER
Patient request refill due to us canceling his appointment he going to  Call [place] to see if he can establish care there.  Please advise

## 2020-01-22 NOTE — TELEPHONE ENCOUNTER
Patient would like to establish care with [facility], is need of provider that would prescribe his oxycodone and clonazepam .

## 2020-01-31 RX ORDER — CLONAZEPAM 2 MG/1
1-2 TABLET ORAL 2 TIMES DAILY PRN
Qty: 60 TABLET | Refills: 0 | Status: SHIPPED | OUTPATIENT
Start: 2020-01-31 | End: 2020-05-06

## 2020-01-31 RX ORDER — OXYCODONE AND ACETAMINOPHEN 10; 325 MG/1; MG/1
1-2 TABLET ORAL EVERY 6 HOURS PRN
Qty: 240 TABLET | Refills: 0 | Status: CANCELLED | OUTPATIENT
Start: 2020-01-31 | End: 2020-03-01

## 2020-01-31 RX ORDER — OXYCODONE AND ACETAMINOPHEN 10; 325 MG/1; MG/1
1-2 TABLET ORAL EVERY 6 HOURS PRN
Qty: 240 TABLET | Refills: 0 | Status: SHIPPED | OUTPATIENT
Start: 2020-01-31 | End: 2020-03-01

## 2020-01-31 NOTE — TELEPHONE ENCOUNTER
JCARLOS at 5:45pm - let him know that I sent both prescriptions, the Percocet and the Klonopin, to the [facility] as requested. Also strongly advised he contact a pain management physician in regards to his Percocet, because he is on a high dose, and I doubt that any primary care physicians would be willing to prescribe it - will still need to see if one of the primary care physicians would be willing to prescribe the Klonopin.       He stated he did recently find out that the primary care physicians will not prescribe the Percocet, so he stated he will be trying to contact 1 or more the pain management physicians which I mentioned to him at his last office visit here (and I wrote out multiple names and some phone numbers) - told him that he will likely need a referral for wherever he goes, and to contact us once he has a tentative appointment, so we can forward whatever information they request.          (OARRS was run and reviewed personally by me, Colton Hayward, on 01/31/20, and no signs or suspicion of diversion or other abuse.)

## 2020-02-13 ENCOUNTER — TELEPHONE (OUTPATIENT)
Dept: FAMILY MEDICINE CLINIC | Age: 55
End: 2020-02-13

## 2020-02-13 NOTE — TELEPHONE ENCOUNTER
Dr's office received office notes on this patient with no other documentation. Is he being referred here? If so, they will need a referral sent.

## 2020-03-10 ENCOUNTER — TELEPHONE (OUTPATIENT)
Dept: FAMILY MEDICINE CLINIC | Age: 55
End: 2020-03-10

## 2020-05-06 ENCOUNTER — VIRTUAL VISIT (OUTPATIENT)
Dept: FAMILY MEDICINE CLINIC | Age: 55
End: 2020-05-06
Payer: MEDICARE

## 2020-05-06 ENCOUNTER — TELEPHONE (OUTPATIENT)
Dept: FAMILY MEDICINE CLINIC | Age: 55
End: 2020-05-06

## 2020-05-06 VITALS — HEIGHT: 71 IN | WEIGHT: 305 LBS | BODY MASS INDEX: 42.7 KG/M2

## 2020-05-06 PROCEDURE — 99214 OFFICE O/P EST MOD 30 MIN: CPT | Performed by: PHYSICIAN ASSISTANT

## 2020-05-06 RX ORDER — QUETIAPINE FUMARATE 400 MG/1
TABLET, FILM COATED ORAL
Qty: 60 TABLET | Refills: 5 | Status: SHIPPED | OUTPATIENT
Start: 2020-05-06 | End: 2021-02-17

## 2020-05-06 RX ORDER — LEVOTHYROXINE SODIUM 88 UG/1
TABLET ORAL
Qty: 30 TABLET | Refills: 12 | Status: SHIPPED | OUTPATIENT
Start: 2020-05-06 | End: 2021-05-13

## 2020-05-06 RX ORDER — FENOFIBRATE 160 MG/1
160 TABLET ORAL DAILY
Qty: 90 TABLET | Refills: 1 | Status: SHIPPED | OUTPATIENT
Start: 2020-05-06 | End: 2022-09-25

## 2020-05-06 RX ORDER — CLONAZEPAM 1 MG/1
1 TABLET ORAL 2 TIMES DAILY PRN
Qty: 60 TABLET | Refills: 0
Start: 2020-05-06 | End: 2020-05-15 | Stop reason: SDUPTHER

## 2020-05-06 RX ORDER — CLONAZEPAM 2 MG/1
1-2 TABLET ORAL 2 TIMES DAILY PRN
Qty: 60 TABLET | Refills: 0 | Status: CANCELLED | OUTPATIENT
Start: 2020-05-06 | End: 2020-06-05

## 2020-05-06 RX ORDER — TIZANIDINE 4 MG/1
TABLET ORAL
Qty: 90 TABLET | Refills: 2 | Status: SHIPPED | OUTPATIENT
Start: 2020-05-06 | End: 2020-05-07 | Stop reason: SDUPTHER

## 2020-05-06 ASSESSMENT — ENCOUNTER SYMPTOMS
RHINORRHEA: 0
ABDOMINAL PAIN: 1
COUGH: 0
VOMITING: 0
SORE THROAT: 0
CONSTIPATION: 0
NAUSEA: 0
DIARRHEA: 0
SHORTNESS OF BREATH: 0

## 2020-05-06 NOTE — PROGRESS NOTES
No    Drug use: No        Allergies   Allergen Reactions    Citalopram Hydrobromide Nausea Only    Cymbalta [Duloxetine Hcl] Other (See Comments)     headache    Darvocet [Propoxyphene N-Acetaminophen] Nausea Only    Escitalopram Oxalate Nausea Only    Lyrica [Pregabalin] Other (See Comments)     Depression suicidal    Morphine Other (See Comments)     Felt strange/\"loopy\"    Neurontin [Gabapentin] Nausea Only    Oxycodone     Protonix [Pantoprazole Sodium Sesquihydrate] Other (See Comments)     headache   ,   Past Medical History:   Diagnosis Date    Anxiety     Chronic back pain     Depression     Depression, major, recurrent, severe with psychosis 5/4/2012    Hydronephrosis of right kidney 7/12/2015    Hypothyroidism 9/24/2015    Retroperitoneal fibrosis 2006    TMJ dysfunction     Wears hearing aid    ,   Past Surgical History:   Procedure Laterality Date    APPENDECTOMY      INNER EAR SURGERY      bilaterally - at least 5-6 surgeries with each one   ,   Social History     Tobacco Use    Smoking status: Current Every Day Smoker     Packs/day: 1.00     Years: 30.00     Pack years: 30.00     Last attempt to quit: 4/1/2010     Years since quitting: 10.1    Smokeless tobacco: Never Used    Tobacco comment: quit for 5 years about 2012   Substance Use Topics    Alcohol use: No    Drug use: No   ,   Family History   Problem Relation Age of Onset    Diabetes Mother     High Blood Pressure Mother     High Cholesterol Mother     Cancer Father         Kidney and thyroid    Cancer Maternal Grandmother     Diabetes Paternal Aunt     Cancer Maternal Grandfather 61        colon   ,   Immunization History   Administered Date(s) Administered    Influenza Virus Vaccine 10/09/2015    Influenza, Intradermal, Preservative free 10/11/2013, 01/20/2015    Influenza, Quadv, IM, PF (6 mo and older Fluzone, Flulaval, Fluarix, and 3 yrs and older Afluria) 01/19/2017, 09/21/2017, 09/24/2018, 11/07/2019 ,   Health Maintenance   Topic Date Due    Hepatitis C screen  1965    Pneumococcal 0-64 years Vaccine (1 of 1 - PPSV23) 07/23/1970    HIV screen  07/23/1979    DTaP/Tdap/Td vaccine (1 - Tdap) 07/23/1983    Shingles Vaccine (1 of 2) 07/23/2014    Colon cancer screen colonoscopy  07/23/2014    Annual Wellness Visit (AWV)  05/29/2019    Low dose CT lung screening  07/23/2019    A1C test (Diabetic or Prediabetic)  08/09/2020    TSH testing  08/09/2020    Lipid screen  08/09/2024    Flu vaccine  Completed    Hepatitis A vaccine  Aged Out    Hepatitis B vaccine  Aged Out    Hib vaccine  Aged Out    Meningococcal (ACWY) vaccine  Aged Out       PHYSICAL EXAMINATION:  [ INSTRUCTIONS:  \"[x]\" Indicates a positive item  \"[]\" Indicates a negative item  -- DELETE ALL ITEMS NOT EXAMINED]  Vital Signs: (As obtained by patient/caregiver or practitioner observation)    Blood pressure-  Heart rate-    Respiratory rate-    Temperature-  Pulse oximetry-     Constitutional: [x] Appears well-developed and well-nourished [x] No apparent distress      [] Abnormal-   Mental status  [x] Alert and awake  [x] Oriented to person/place/time [x]Able to follow commands      Eyes:  EOM    [x]  Normal  [] Abnormal-  Sclera  [x]  Normal  [] Abnormal -         Discharge []  None visible  [] Abnormal -    HENT:   [x] Normocephalic, atraumatic.   [x] Abnormal decreased hearing    [x] Mouth/Throat: Mucous membranes are moist.     External Ears [x] Normal  [] Abnormal-     Neck: [x] No visualized mass     Pulmonary/Chest: [x] Respiratory effort normal.  [] No visualized signs of difficulty breathing or respiratory distress        [] Abnormal-      Musculoskeletal:   [] Normal gait with no signs of ataxia         [x] Normal range of motion of neck        [] Abnormal- left sided limp      Neurological:        [x] No Facial Asymmetry (Cranial nerve 7 motor function) (limited exam to video visit)          [x] No gaze palsy        [] Abnormal-         Skin:        [x] No significant exanthematous lesions or discoloration noted on facial skin         [] Abnormal-            Psychiatric:       [x] Normal Affect [] No Hallucinations        [] Abnormal-     Other pertinent observable physical exam findings-     ASSESSMENT/PLAN:  1. Anxiety  - Stable, decrease klonopin from 2 mg twice nightly to 1 mg twice nightly. Continue seroquel at current dose. Discussed with patient that he may need psychiatry referral in the future. - clonazePAM (KLONOPIN) 1 MG tablet; Take 1 tablet by mouth 2 times daily as needed for Anxiety for up to 30 days. MUST LAST 30 DAYS, NO EXCEPTIONS!!!  Dispense: 60 tablet; Refill: 0  - QUEtiapine (SEROQUEL) 400 MG tablet; TAKE ONE TABLET BY MOUTH TWICE A DAY AS DIRECTED  Dispense: 60 tablet; Refill: 5    2. Left groin pain  3. Idiopathic retroperitoneal fibrosis  - Lengthy discussion with patient regarding request for pain medication. Previous PCP left abruptly and patient has tapered himself with resulting withdrawal symptoms. Admits to smoking marijuana to help with the pain. Discussed with patient that he will need to establish with pain management. That amount of pain medication at that frequency is unsafe and will not be prescribed. Pending a urine drug screen, I will send in a prescription for a much lower dose, this will be temporary until he can be seen by pain management. Referral placed and scheduling instructions given. - tiZANidine (ZANAFLEX) 4 MG tablet; MUST LAST 30 DAYS**  Dispense: 90 tablet; Refill: 2  - Cheyenne Isabel MD, Pain Management, [facility]  - Drug Panel-PM-HI Res-UR Interp-A; Future  - Will need UDS results and pt to sign contract prior to prescribing. 4. Insomnia, unspecified type  - Currently on klonopin 2 mg 1-2 tablets nightly. Will continue at a tapered dose given he is also taking pain medication. Discussed the risks of taking narcotics and benzos together.      5. Vitamin D

## 2020-05-06 NOTE — TELEPHONE ENCOUNTER
Pharmacy needs clarification of rx. No directions included only legible directions state\"must last 30 days,\" pharmacy needs to know how its taken.     Medication:    tiZANidine (ZANAFLEX) 4 MG tablet    Pharmacy:    [address], [address] [place] [address] [phone] - F [phone]

## 2020-05-07 RX ORDER — TIZANIDINE 4 MG/1
4 TABLET ORAL EVERY 8 HOURS PRN
Qty: 90 TABLET | Refills: 2 | Status: SHIPPED | OUTPATIENT
Start: 2020-05-07 | End: 2021-07-15 | Stop reason: SDUPTHER

## 2020-05-08 DIAGNOSIS — R10.32 LEFT GROIN PAIN: ICD-10-CM

## 2020-05-08 DIAGNOSIS — N13.5 IDIOPATHIC RETROPERITONEAL FIBROSIS: ICD-10-CM

## 2020-05-11 NOTE — TELEPHONE ENCOUNTER
Last Seen: Visit date not found    Last Writen: Klonopin 5-6-2020 but did not transmit to the pharmacy                        Percocet  1- #240 by Dr. Sean Solis    Last UDS: 5-8-2020 PENDING    Med Agreement Signed On: 5-8-2020    Next Appointment: 8/24/2020    Requested Prescriptions     Pending Prescriptions Disp Refills    clonazePAM (KLONOPIN) 1 MG tablet 60 tablet 0     Sig: Take 1 tablet by mouth 2 times daily as needed for Anxiety for up to 30 days. MUST LAST 30 DAYS, NO EXCEPTIONS!!!    oxyCODONE-acetaminophen (PERCOCET)  MG per tablet 240 tablet 0     Sig: Take 1-2 tablets by mouth every 6 hours as needed for Pain for up to 30 days. (Instead of the oxycodone 20mg tablets).

## 2020-05-14 NOTE — TELEPHONE ENCOUNTER
Spoke to the lab said that for protocol they have to do repeat testing in the lab the pt does not have to come back in they do it all in the lab . she said that the completion date is estimated for may 16th pt was called and informed of this.

## 2020-05-15 LAB
6-ACETYLMORPHINE: NOT DETECTED
7-AMINOCLONAZEPAM: NOT DETECTED
ALPHA-OH-ALPRAZOLAM: NOT DETECTED
ALPRAZOLAM: NOT DETECTED
AMPHETAMINE: NOT DETECTED
BARBITURATES: NOT DETECTED
BENZOYLECGONINE: NOT DETECTED
BUPRENORPHINE: NOT DETECTED
CARISOPRODOL: NOT DETECTED
CLONAZEPAM: NOT DETECTED
CODEINE: NOT DETECTED
CREATININE URINE: 140.2 MG/DL (ref 20–400)
DIAZEPAM: NOT DETECTED
DRUGS EXPECTED: NORMAL
EER PAIN MGT DRUG PANEL, HIGH RES/EMIT U: NORMAL
ETHYL GLUCURONIDE: NOT DETECTED
FENTANYL: NOT DETECTED
HYDROCODONE: NOT DETECTED
HYDROMORPHONE: NOT DETECTED
LORAZEPAM: NOT DETECTED
MARIJUANA METABOLITE: PRESENT
MDA: NOT DETECTED
MDEA: NOT DETECTED
MDMA URINE: NOT DETECTED
MEPERIDINE: NOT DETECTED
METHADONE: NOT DETECTED
METHAMPHETAMINE: NOT DETECTED
METHYLPHENIDATE: NOT DETECTED
MIDAZOLAM: NOT DETECTED
MORPHINE: NOT DETECTED
NORBUPRENORPHINE, FREE: NOT DETECTED
NORDIAZEPAM: NOT DETECTED
NORFENTANYL: NOT DETECTED
NORHYDROCODONE, URINE: NOT DETECTED
NOROXYCODONE: NOT DETECTED
NOROXYMORPHONE, URINE: NOT DETECTED
OXAZEPAM: NOT DETECTED
OXYCODONE: NOT DETECTED
OXYMORPHONE: NOT DETECTED
PAIN MANAGEMENT DRUG PANEL: NORMAL
PAIN MANAGEMENT DRUG PANEL: NORMAL
PCP: NOT DETECTED
PHENTERMINE: NOT DETECTED
PROPOXYPHENE: NOT DETECTED
TAPENTADOL, URINE: NOT DETECTED
TAPENTADOL-O-SULFATE, URINE: NOT DETECTED
TEMAZEPAM: NOT DETECTED
TRAMADOL: NOT DETECTED
ZOLPIDEM: NOT DETECTED

## 2020-05-15 RX ORDER — MELOXICAM 15 MG/1
15 TABLET ORAL DAILY
Qty: 90 TABLET | Refills: 1 | Status: SHIPPED | OUTPATIENT
Start: 2020-05-15 | End: 2021-03-25 | Stop reason: ALTCHOICE

## 2020-05-15 RX ORDER — OXYCODONE AND ACETAMINOPHEN 10; 325 MG/1; MG/1
1-2 TABLET ORAL EVERY 6 HOURS PRN
Qty: 240 TABLET | Refills: 0 | OUTPATIENT
Start: 2020-05-15 | End: 2020-06-14

## 2020-05-15 RX ORDER — CLONAZEPAM 1 MG/1
1 TABLET ORAL 2 TIMES DAILY PRN
Qty: 60 TABLET | Refills: 0 | Status: SHIPPED | OUTPATIENT
Start: 2020-05-15 | End: 2020-07-21

## 2020-07-21 RX ORDER — CLONAZEPAM 1 MG/1
TABLET ORAL
Qty: 60 TABLET | Refills: 0 | Status: SHIPPED | OUTPATIENT
Start: 2020-07-21 | End: 2020-09-23

## 2020-08-06 ENCOUNTER — TELEPHONE (OUTPATIENT)
Dept: FAMILY MEDICINE CLINIC | Age: 55
End: 2020-08-06

## 2020-08-06 NOTE — TELEPHONE ENCOUNTER
----- Message from Rejidrewzenobia Freogso sent at 8/5/2020  4:56 PM EDT -----  Subject: Appointment Request    Reason for Call: Routine Existing Condition Follow Up    QUESTIONS  Type of Appointment? Established Patient  Reason for appointment request? Available appointments did not meet   patient need  Additional Information for Provider? pt stated he would like to get blood   work and get his liver checked   ---------------------------------------------------------------------------  --------------  CALL BACK INFO  What is the best way for the office to contact you? OK to leave message on   voicemail  Preferred Call Back Phone Number? [phone]  ---------------------------------------------------------------------------  --------------  SCRIPT ANSWERS  Relationship to Patient? Self  Appointment reason? Well Care/Follow Ups  Select a Well Care/Follow Ups appointment reason? Adult Existing Condition   Follow Up [Diabetes   CHF   COPD   Hypertension/Blood Pressure Check]  (Is the patient requesting to be seen urgently for their symptoms?)? No  Is this follow up request related to routine Diabetes Management? No  Are you having any new concerns about your existing condition? No  Have you been diagnosed with   tested for   or told that you are suspected of having COVID-19 (Coronavirus)? No  Have you had a fever or taken medication to treat a fever within the past   3 days? No  Have you had a cough   shortness of breath or flu-like symptoms within the past 3 days? No  Do you currently have flu-like symptoms including fever or chills   cough   shortness of breath   or difficulty breathing   or new loss of taste or smell? No  (Service Expert  click yes below to proceed with [facility] As Usual   Scheduling)?  Yes

## 2020-08-06 NOTE — TELEPHONE ENCOUNTER
Pt is scheduled for 8/24/20 with [facility]. Will add to notes that he would like to be tested. Not sure if he wants tests done before appt. LVM for pt to return call.

## 2020-08-24 ENCOUNTER — OFFICE VISIT (OUTPATIENT)
Dept: FAMILY MEDICINE CLINIC | Age: 55
End: 2020-08-24
Payer: MEDICARE

## 2020-08-24 VITALS
DIASTOLIC BLOOD PRESSURE: 74 MMHG | HEART RATE: 90 BPM | OXYGEN SATURATION: 96 % | SYSTOLIC BLOOD PRESSURE: 134 MMHG | WEIGHT: 307 LBS | BODY MASS INDEX: 42.82 KG/M2 | TEMPERATURE: 98.4 F

## 2020-08-24 DIAGNOSIS — E78.5 HYPERLIPIDEMIA, UNSPECIFIED HYPERLIPIDEMIA TYPE: ICD-10-CM

## 2020-08-24 DIAGNOSIS — R73.03 PREDIABETES: ICD-10-CM

## 2020-08-24 DIAGNOSIS — E03.9 ACQUIRED HYPOTHYROIDISM: ICD-10-CM

## 2020-08-24 LAB
HCT VFR BLD CALC: 46.8 % (ref 40.5–52.5)
HEMOGLOBIN: 15.5 G/DL (ref 13.5–17.5)
MCH RBC QN AUTO: 29.8 PG (ref 26–34)
MCHC RBC AUTO-ENTMCNC: 33.1 G/DL (ref 31–36)
MCV RBC AUTO: 90 FL (ref 80–100)
PDW BLD-RTO: 15.6 % (ref 12.4–15.4)
PLATELET # BLD: 273 K/UL (ref 135–450)
PMV BLD AUTO: 8.6 FL (ref 5–10.5)
RBC # BLD: 5.2 M/UL (ref 4.2–5.9)
WBC # BLD: 10.4 K/UL (ref 4–11)

## 2020-08-24 PROCEDURE — 99214 OFFICE O/P EST MOD 30 MIN: CPT | Performed by: PHYSICIAN ASSISTANT

## 2020-08-24 RX ORDER — ERGOCALCIFEROL (VITAMIN D2) 1250 MCG
50000 CAPSULE ORAL
Qty: 8 CAPSULE | Refills: 5 | Status: SHIPPED | OUTPATIENT
Start: 2020-08-24

## 2020-08-24 ASSESSMENT — ENCOUNTER SYMPTOMS
DIARRHEA: 0
RHINORRHEA: 0
NAUSEA: 0
SHORTNESS OF BREATH: 0
ABDOMINAL PAIN: 1
SORE THROAT: 0
CONSTIPATION: 0
VOMITING: 0
COUGH: 0

## 2020-08-24 NOTE — PROGRESS NOTES
2020  Ashu Freeman (: 1965)  63 y.o. HPI    Patient's main concern continues to be chronic pain. Has history of idiopathic retroperitoneal fibrosis that radiates into left groin pain. Saw rheum many years ago and trial of steroids and methotrexate ineffective. Previous PCP (long standing relationship) was prescribing percocet 10 mg q3 hours for pain management. Has since followed up with pain management and has resumed percocet. Pt does not feel it is adequate dose.      Insomnia/panic disorder: pt reports stems from the pain. Takes klonopin 2mg 1-2 times nightly to assist with sleep in addition to seroquel. Decreased to 1 mg twice nightly. .      Hypothyroidism: stable on levothyroxine 88 mcg daily. Labs last checked 2019 and wnl. Denies fatigue, weight gain, heat/cold intolerance.      HLD: , , , HLD 35. Currently on fenofibrate. Denies family history of cva/mi. NO current exercise regimen 2/2 to pain. Not fasting today.        IFG: last a1c 6.3. Not currently following low carbohydrate diet. No exercise regimen. Mom and aunt with T2DM. Check today      Tobacco dependence: smokes 1ppd. Not interested in quitting at this time. Had LDCT \"years ago\" continues smoking. Agreeable to repeat CT.      History of DVT : swelling of the right lower extremitywith pain up to the thigh and pain in both groin areas and an ultrasoundevaluation that revealed extensive large DVT. He underwent thrombolysis,stent placement and was previously on anticoagulation under the direction of Dr. Samara Gay stopped after ~6 months. No recurrence. Does have bilateral lower extremity edema- would likely benefit from diuretic. Review of Systems   Constitutional: Negative for activity change, chills and fever. HENT: Negative for congestion, ear pain, rhinorrhea and sore throat. Eyes: Negative for visual disturbance. Respiratory: Negative for cough and shortness of breath.     Cardiovascular: Positive for leg swelling (bilateral). Negative for chest pain and palpitations. Gastrointestinal: Positive for abdominal pain (chronic). Negative for constipation, diarrhea, nausea and vomiting. Genitourinary: Negative for difficulty urinating and dysuria. Musculoskeletal: Negative for arthralgias and myalgias. Skin: Negative for rash. Neurological: Negative for dizziness, weakness and numbness. Psychiatric/Behavioral: Positive for sleep disturbance. The patient is nervous/anxious. Allergies, past medical history, family history, and social history reviewed and unchanged from previous encounter. Current Outpatient Medications   Medication Sig Dispense Refill    ergocalciferol (DRISDOL) 1.25 MG (18493 UT) capsule Take 1 capsule by mouth twice a week (for 6 months total) for Vitamin D deficiency. 8 capsule 5    meloxicam (MOBIC) 15 MG tablet Take 1 tablet by mouth daily 90 tablet 1    tiZANidine (ZANAFLEX) 4 MG tablet Take 1 tablet by mouth every 8 hours as needed (pain) MUST LAST 30 DAYS** 90 tablet 2    fenofibrate (TRIGLIDE) 160 MG tablet Take 1 tablet by mouth daily 90 tablet 1    levothyroxine (SYNTHROID) 88 MCG tablet Take 1 tablet by mouth every day (instead of the 75mcg dose) for underactive thyroid. 30 tablet 12    QUEtiapine (SEROQUEL) 400 MG tablet TAKE ONE TABLET BY MOUTH TWICE A DAY AS DIRECTED 60 tablet 5    naloxone 4 MG/0.1ML LIQD nasal spray 1 spray by Nasal route as needed for Opioid Reversal 1 each 5    clonazePAM (KLONOPIN) 1 MG tablet TAKE ONE TABLET BY MOUTH TWICE A DAY AS NEEDED FOR ANXIETY. 60 tablet 0     No current facility-administered medications for this visit. Vitals:    08/24/20 1253   BP: 134/74   Pulse: 90   Temp: 98.4 °F (36.9 °C)   TempSrc: Oral   SpO2: 96%   Weight: (!) 307 lb (139.3 kg)     Estimated body mass index is 42.82 kg/m² as calculated from the following:    Height as of 5/6/20: 5' 11\" (1.803 m).     Weight as of this encounter: 307 lb (139.3 kg). Physical Exam  Constitutional:       General: He is not in acute distress. Appearance: He is well-developed. HENT:      Head: Normocephalic and atraumatic. Eyes:      Conjunctiva/sclera: Conjunctivae normal.      Pupils: Pupils are equal, round, and reactive to light. Neck:      Musculoskeletal: Neck supple. Cardiovascular:      Rate and Rhythm: Normal rate and regular rhythm. Heart sounds: Normal heart sounds. No murmur. Pulmonary:      Effort: Pulmonary effort is normal.      Breath sounds: Normal breath sounds. No wheezing. Abdominal:      General: Bowel sounds are normal.      Palpations: Abdomen is soft. Tenderness: There is no abdominal tenderness. Musculoskeletal:      Right lower leg: Edema (1+) present. Left lower leg: Edema (2+) present. Lymphadenopathy:      Cervical: No cervical adenopathy. Skin:     General: Skin is warm and dry. Findings: No rash. Neurological:      Mental Status: He is alert and oriented to person, place, and time. Deep Tendon Reflexes: Reflexes are normal and symmetric. ASSESSMENT and PLAN:  Huong Thomas was seen today for pain. Diagnoses and all orders for this visit:    Prediabetes  -     Hemoglobin A1C; Future    Hyperlipidemia, unspecified hyperlipidemia type  -     CBC; Future  -     COMPREHENSIVE METABOLIC PANEL; Future  -     LIPID PANEL; Future    Acquired hypothyroidism  -     TSH with Reflex; Future    Tobacco dependence  -     CT CHEST WO CONTRAST; Future    Idiopathic retroperitoneal fibrosis  - Continue pain management follow up     Depression, major, recurrent, in partial remission ([facility] [address]  Anxiety  - Continue prn klonopin at reduced mg. Discussed risk of taking benzodiazepine with opioid. May need psychiatry referral.   - Discussed benefits of follow up with Dr. Liam Warner, patient declines. LE swelling  - Can trial prn lasix if kidney function ok on rfp.      Return in about 3 months (around 11/24/2020)

## 2020-08-24 NOTE — TELEPHONE ENCOUNTER
Last office visit 5/6/2020     Next office visit scheduled 8/24/2020    Requested Prescriptions     Pending Prescriptions Disp Refills    ergocalciferol (DRISDOL) 1.25 MG (47556 UT) capsule 8 capsule 5     Sig: Take 1 capsule by mouth twice a week (for 6 months total) for Vitamin D deficiency.

## 2020-08-25 LAB
A/G RATIO: 1.6 (ref 1.1–2.2)
ALBUMIN SERPL-MCNC: 4.5 G/DL (ref 3.4–5)
ALP BLD-CCNC: 94 U/L (ref 40–129)
ALT SERPL-CCNC: 14 U/L (ref 10–40)
ANION GAP SERPL CALCULATED.3IONS-SCNC: 17 MMOL/L (ref 3–16)
AST SERPL-CCNC: 14 U/L (ref 15–37)
BILIRUB SERPL-MCNC: <0.2 MG/DL (ref 0–1)
BUN BLDV-MCNC: 11 MG/DL (ref 7–20)
CALCIUM SERPL-MCNC: 10.2 MG/DL (ref 8.3–10.6)
CHLORIDE BLD-SCNC: 100 MMOL/L (ref 99–110)
CO2: 24 MMOL/L (ref 21–32)
CREAT SERPL-MCNC: 1.2 MG/DL (ref 0.9–1.3)
ESTIMATED AVERAGE GLUCOSE: 137 MG/DL
GFR AFRICAN AMERICAN: >60
GFR NON-AFRICAN AMERICAN: >60
GLOBULIN: 2.9 G/DL
GLUCOSE BLD-MCNC: 119 MG/DL (ref 70–99)
HBA1C MFR BLD: 6.4 %
POTASSIUM SERPL-SCNC: 4 MMOL/L (ref 3.5–5.1)
SODIUM BLD-SCNC: 141 MMOL/L (ref 136–145)
TOTAL PROTEIN: 7.4 G/DL (ref 6.4–8.2)
TSH REFLEX: 3.02 UIU/ML (ref 0.27–4.2)

## 2020-08-26 PROBLEM — N13.9 OBSTRUCTIVE UROPATHY: Status: ACTIVE | Noted: 2020-08-26

## 2020-09-21 NOTE — TELEPHONE ENCOUNTER
Refill Request     Last Seen: 8/24/2020    Last Written: 7/21/2020    Next Appointment:   Future Appointments   Date Time Provider Ryan Davenport   9/25/2020  1:00 PM BRIANNA Castellanos [facility]             Requested Prescriptions     Pending Prescriptions Disp Refills    clonazePAM (KLONOPIN) 1 MG tablet [Pharmacy Med Name: clonazePAM 1 MG TABLET] 60 tablet 0     Sig: TAKE ONE TABLET BY MOUTH TWICE A DAY AS NEEDED FOR ANXIETY

## 2020-09-23 RX ORDER — CLONAZEPAM 1 MG/1
TABLET ORAL
Qty: 60 TABLET | Refills: 0 | Status: SHIPPED | OUTPATIENT
Start: 2020-09-23 | End: 2020-10-23

## 2020-09-25 ENCOUNTER — OFFICE VISIT (OUTPATIENT)
Dept: FAMILY MEDICINE CLINIC | Age: 55
End: 2020-09-25
Payer: MEDICARE

## 2020-09-25 VITALS
OXYGEN SATURATION: 95 % | TEMPERATURE: 96.8 F | DIASTOLIC BLOOD PRESSURE: 68 MMHG | SYSTOLIC BLOOD PRESSURE: 122 MMHG | HEART RATE: 111 BPM | WEIGHT: 307 LBS | BODY MASS INDEX: 41.58 KG/M2 | HEIGHT: 72 IN

## 2020-09-25 DIAGNOSIS — E78.5 HYPERLIPIDEMIA, UNSPECIFIED HYPERLIPIDEMIA TYPE: ICD-10-CM

## 2020-09-25 PROCEDURE — 99213 OFFICE O/P EST LOW 20 MIN: CPT | Performed by: PHYSICIAN ASSISTANT

## 2020-09-25 PROCEDURE — G0008 ADMIN INFLUENZA VIRUS VAC: HCPCS | Performed by: PHYSICIAN ASSISTANT

## 2020-09-25 PROCEDURE — 90686 IIV4 VACC NO PRSV 0.5 ML IM: CPT | Performed by: PHYSICIAN ASSISTANT

## 2020-09-25 RX ORDER — DOCUSATE SODIUM 100 MG/1
100 CAPSULE, LIQUID FILLED ORAL 2 TIMES DAILY
Qty: 60 CAPSULE | Refills: 0 | Status: SHIPPED | OUTPATIENT
Start: 2020-09-25 | End: 2020-10-25

## 2020-09-25 RX ORDER — OXYCODONE AND ACETAMINOPHEN 7.5; 325 MG/1; MG/1
4 TABLET ORAL DAILY
COMMUNITY
Start: 2020-06-01 | End: 2021-09-09

## 2020-09-25 NOTE — PROGRESS NOTES
10/13/2020  Tyra Parker (: 1965)  63 y.o. HPI  Patient returns for HLD follow up. Fasting today. On fenofibrate. No current diet or exercise regimen. A1c on last labs 6.4. Patient currently eating convenient foods  - usually prepackaged  - has family history of DM- mom     Also with some constipation from Cheyenne Wheeler. Not currently taking anything to help with BMs. Declines cologuard today        Review of Systems   Constitutional: Negative for activity change, chills and fever. HENT: Negative for congestion, ear pain, rhinorrhea and sore throat. Eyes: Negative for visual disturbance. Respiratory: Negative for cough and shortness of breath. Cardiovascular: Negative for chest pain and palpitations. Gastrointestinal: Positive for constipation. Negative for abdominal pain, diarrhea, nausea and vomiting. Genitourinary: Negative for difficulty urinating and dysuria. Musculoskeletal: Negative for arthralgias and myalgias. Skin: Negative for rash. Neurological: Negative for dizziness, weakness and numbness. Psychiatric/Behavioral: Negative for sleep disturbance. Allergies, past medical history, family history, and social history reviewed and unchanged from previous encounter. Current Outpatient Medications   Medication Sig Dispense Refill    oxyCODONE-acetaminophen (PERCOCET) 7.5-325 MG per tablet Take 4 tablets by mouth daily.  docusate sodium (COLACE) 100 MG capsule Take 1 capsule by mouth 2 times daily 60 capsule 0    clonazePAM (KLONOPIN) 1 MG tablet TAKE ONE TABLET BY MOUTH TWICE A DAY AS NEEDED FOR ANXIETY 60 tablet 0    ergocalciferol (DRISDOL) 1.25 MG (23390 UT) capsule Take 1 capsule by mouth twice a week (for 6 months total) for Vitamin D deficiency.  8 capsule 5    meloxicam (MOBIC) 15 MG tablet Take 1 tablet by mouth daily 90 tablet 1    tiZANidine (ZANAFLEX) 4 MG tablet Take 1 tablet by mouth every 8 hours as needed (pain) MUST LAST 30 DAYS** 90 tablet 2    fenofibrate (TRIGLIDE) 160 MG tablet Take 1 tablet by mouth daily 90 tablet 1    levothyroxine (SYNTHROID) 88 MCG tablet Take 1 tablet by mouth every day (instead of the 75mcg dose) for underactive thyroid. 30 tablet 12    QUEtiapine (SEROQUEL) 400 MG tablet TAKE ONE TABLET BY MOUTH TWICE A DAY AS DIRECTED 60 tablet 5    naloxone 4 MG/0.1ML LIQD nasal spray 1 spray by Nasal route as needed for Opioid Reversal 1 each 5     No current facility-administered medications for this visit. Vitals:    09/25/20 1250   BP: 122/68   Site: Left Upper Arm   Position: Sitting   Cuff Size: Small Adult   Pulse: 111   Temp: 96.8 °F (36 °C)   TempSrc: Oral   SpO2: 95%  Comment: RA   Weight: (!) 307 lb (139.3 kg)   Height: 6' (1.829 m)     Estimated body mass index is 41.64 kg/m² as calculated from the following:    Height as of this encounter: 6' (1.829 m). Weight as of this encounter: 307 lb (139.3 kg). Physical Exam  Constitutional:       General: He is not in acute distress. Appearance: He is well-developed. HENT:      Head: Normocephalic and atraumatic. Eyes:      Conjunctiva/sclera: Conjunctivae normal.      Pupils: Pupils are equal, round, and reactive to light. Neck:      Musculoskeletal: Neck supple. Cardiovascular:      Rate and Rhythm: Normal rate and regular rhythm. Heart sounds: Normal heart sounds. No murmur. Pulmonary:      Effort: Pulmonary effort is normal.      Breath sounds: Normal breath sounds. No wheezing. Abdominal:      General: Bowel sounds are normal.      Palpations: Abdomen is soft. Tenderness: There is no abdominal tenderness. Lymphadenopathy:      Cervical: No cervical adenopathy. Skin:     General: Skin is warm and dry. Findings: No rash. Neurological:      Mental Status: He is alert and oriented to person, place, and time. Deep Tendon Reflexes: Reflexes are normal and symmetric.          ASSESSMENT and PLAN:  Amparo Phelps was seen today for follow-up. Diagnoses and all orders for this visit:    Hyperlipidemia, unspecified hyperlipidemia type  -     LIPID PANEL; Future  - High TG otherwise normal   - continue fenofibrate, and work on low carb diet. Prediabetes  -The patient is advised to begin progressive daily aerobic exercise program, attempt to lose weight and reduce salt in diet and cooking.  -Diabetes education provided today:    Diabetes pathoetiology, difference between type 1 and type 2 diabetes, and progressive nature of Type 2 DM. Nutrition as a mainstream of diabetes therapy. [place] about label reading. Carbs: good carbs and bad carbs, importance of carb counting, incorporation of protein with each meal to reduce Glycemic index, importance of portions,       Constipation, unspecified constipation type  -     docusate sodium (COLACE) 100 MG capsule; Take 1 capsule by mouth 2 times daily    Need for influenza vaccination  -     INFLUENZA, QUADV, 3 YRS AND OLDER, IM PF, PREFILL SYR OR SDV, 0.5ML (AFLURIA QUADV, PF)      Return in about 3 months (around 12/25/2020) for Impaired Fasting Glucose.

## 2020-09-25 NOTE — PROGRESS NOTES
Vaccine Information Sheet, \"Influenza - Inactivated\"  given to Yong Pina, or parent/legal guardian of  Yong Pina and verbalized understanding. Patient responses:    Have you ever had a reaction to a flu vaccine? No  Are you able to eat eggs without adverse effects? Yes  Do you have any current illness? No  Have you ever had [place] Syndrome? No    Flu vaccine given per order. Please see immunization tab.

## 2020-09-26 LAB
CHOLESTEROL, TOTAL: 180 MG/DL (ref 0–199)
HDLC SERPL-MCNC: 32 MG/DL (ref 40–60)
LDL CHOLESTEROL CALCULATED: ABNORMAL MG/DL
LDL CHOLESTEROL DIRECT: 90 MG/DL
TRIGL SERPL-MCNC: 399 MG/DL (ref 0–150)
VLDLC SERPL CALC-MCNC: ABNORMAL MG/DL

## 2020-10-13 ASSESSMENT — ENCOUNTER SYMPTOMS
ABDOMINAL PAIN: 0
NAUSEA: 0
RHINORRHEA: 0
SORE THROAT: 0
CONSTIPATION: 1
SHORTNESS OF BREATH: 0
VOMITING: 0
DIARRHEA: 0
COUGH: 0

## 2020-10-21 NOTE — TELEPHONE ENCOUNTER
Refill Request     Last Seen: 9/25/2020    Last Written: #60  0rf  9/23/2020    Next Appointment:   Future Appointments   Date Time Provider Ryan Davenport   12/28/2020  1:00 PM BRIANNA Yo [facility]       Appointment scheduled      Requested Prescriptions     Pending Prescriptions Disp Refills    clonazePAM (KLONOPIN) 1 MG tablet [Pharmacy Med Name: clonazePAM 1 MG TABLET] 60 tablet 0     Sig: TAKE ONE TABLET BY MOUTH TWICE A DAY AS NEEDED FOR ANXIETY

## 2020-10-23 RX ORDER — CLONAZEPAM 1 MG/1
TABLET ORAL
Qty: 60 TABLET | Refills: 0 | Status: SHIPPED | OUTPATIENT
Start: 2020-10-23 | End: 2020-11-25

## 2020-11-24 NOTE — TELEPHONE ENCOUNTER
.  Last office visit 9/25/2020     Last written 10/23/2020 60 with 0      Next office visit scheduled 12/28/2020    Requested Prescriptions     Pending Prescriptions Disp Refills    clonazePAM (KLONOPIN) 1 MG tablet [Pharmacy Med Name: clonazePAM 1 MG TABLET] 60 tablet 0     Sig: TAKE ONE TABLET BY MOUTH TWICE A DAY AS NEEDED FOR ANXIETY

## 2020-11-25 RX ORDER — CLONAZEPAM 1 MG/1
TABLET ORAL
Qty: 60 TABLET | Refills: 0 | Status: SHIPPED | OUTPATIENT
Start: 2020-11-25 | End: 2020-12-24

## 2020-12-24 RX ORDER — CLONAZEPAM 1 MG/1
TABLET ORAL
Qty: 60 TABLET | Refills: 0 | Status: SHIPPED | OUTPATIENT
Start: 2020-12-24 | End: 2021-01-21

## 2021-01-07 ENCOUNTER — VIRTUAL VISIT (OUTPATIENT)
Dept: FAMILY MEDICINE CLINIC | Age: 56
End: 2021-01-07
Payer: MEDICARE

## 2021-01-07 DIAGNOSIS — Z12.11 COLON CANCER SCREENING: ICD-10-CM

## 2021-01-07 DIAGNOSIS — F41.0 PANIC DISORDER: ICD-10-CM

## 2021-01-07 DIAGNOSIS — K92.1 MELENA: ICD-10-CM

## 2021-01-07 DIAGNOSIS — F41.9 ANXIETY: Primary | ICD-10-CM

## 2021-01-07 DIAGNOSIS — Z20.822 CLOSE EXPOSURE TO COVID-19 VIRUS: ICD-10-CM

## 2021-01-07 PROCEDURE — 98967 PH1 ASSMT&MGMT NQHP 11-20: CPT | Performed by: PHYSICIAN ASSISTANT

## 2021-01-07 SDOH — ECONOMIC STABILITY: INCOME INSECURITY: HOW HARD IS IT FOR YOU TO PAY FOR THE VERY BASICS LIKE FOOD, HOUSING, MEDICAL CARE, AND HEATING?: SOMEWHAT HARD

## 2021-01-07 SDOH — ECONOMIC STABILITY: TRANSPORTATION INSECURITY
IN THE PAST 12 MONTHS, HAS LACK OF TRANSPORTATION KEPT YOU FROM MEETINGS, WORK, OR FROM GETTING THINGS NEEDED FOR DAILY LIVING?: NO

## 2021-01-07 SDOH — ECONOMIC STABILITY: FOOD INSECURITY: WITHIN THE PAST 12 MONTHS, THE FOOD YOU BOUGHT JUST DIDN'T LAST AND YOU DIDN'T HAVE MONEY TO GET MORE.: SOMETIMES TRUE

## 2021-01-07 SDOH — ECONOMIC STABILITY: TRANSPORTATION INSECURITY
IN THE PAST 12 MONTHS, HAS THE LACK OF TRANSPORTATION KEPT YOU FROM MEDICAL APPOINTMENTS OR FROM GETTING MEDICATIONS?: NO

## 2021-01-07 SDOH — ECONOMIC STABILITY: FOOD INSECURITY: WITHIN THE PAST 12 MONTHS, YOU WORRIED THAT YOUR FOOD WOULD RUN OUT BEFORE YOU GOT MONEY TO BUY MORE.: OFTEN TRUE

## 2021-01-07 NOTE — PROGRESS NOTES
Guadelupe Brittle is a 63 y.o. male evaluated via telephone on 1/7/2021. Consent:  He and/or health care decision maker is aware that that he may receive a bill for this telephone service, depending on his insurance coverage, and has provided verbal consent to proceed: Yes      Documentation:  I communicated with the patient and/or health care decision maker about controlled med management. Details of this discussion including any medical advice provided:     Insomnia/panic disorder: pt reports stems from the pain. Takes klonopin 2mg 1-2 times nightly to assist with sleep in addition to seroquel. Decreased to 1 mg twice nightly, pt is tolerating well. Feels that symptoms are well controlled on this regimen.   - oarrs reviewed, taking appropriately, continue   - not yet due for refill. Pt son exposed to covid+ Sun, pt son lives with him   Pt is asymptomatic.   - discussed quarantining 10 days from Sunday which would be Tuesday 1/12  - pt to call the office if he develops symptoms, will get him scheduled for covid testing     Episode last month with \"coal black\" bowel movement. It has since resolved, patient states colonoscopy was about 8 years ago with polyps that were benign. Due for repeat. - referral to GI placed for melena and colonoscopy. I affirm this is a Patient Initiated Episode with a Patient who has not had a related appointment within my department in the past 7 days or scheduled within the next 24 hours.     Patient identification was verified at the start of the visit: Yes    Total Time: minutes: 11-20 minutes    Note: not billable if this call serves to triage the patient into an appointment for the relevant concern      Rafi Carvajal

## 2021-01-19 DIAGNOSIS — F41.9 ANXIETY: ICD-10-CM

## 2021-01-21 RX ORDER — CLONAZEPAM 1 MG/1
TABLET ORAL
Qty: 60 TABLET | Refills: 0 | Status: SHIPPED | OUTPATIENT
Start: 2021-01-21 | End: 2021-02-18 | Stop reason: SDUPTHER

## 2021-02-17 DIAGNOSIS — F33.41 DEPRESSION, MAJOR, RECURRENT, IN PARTIAL REMISSION (HCC): ICD-10-CM

## 2021-02-17 DIAGNOSIS — F41.9 ANXIETY: ICD-10-CM

## 2021-02-17 RX ORDER — QUETIAPINE FUMARATE 400 MG/1
TABLET, FILM COATED ORAL
Qty: 60 TABLET | Refills: 4 | Status: SHIPPED | OUTPATIENT
Start: 2021-02-17 | End: 2021-08-19 | Stop reason: SDUPTHER

## 2021-02-17 NOTE — TELEPHONE ENCOUNTER
.  Last office visit 1/7/2021     Last written 2-17-21 60 with 4      Next office visit scheduled 4/8/2021    Requested Prescriptions     Pending Prescriptions Disp Refills    QUEtiapine (SEROQUEL) 400 MG tablet [Pharmacy Med Name: QUEtiapine FUMARATE 400 MG TAB] 60 tablet 4     Sig: TAKE ONE TABLET BY MOUTH TWICE A DAY AS DIRECTED

## 2021-02-18 ENCOUNTER — TELEPHONE (OUTPATIENT)
Dept: FAMILY MEDICINE CLINIC | Age: 56
End: 2021-02-18

## 2021-02-18 DIAGNOSIS — F41.9 ANXIETY: ICD-10-CM

## 2021-02-18 RX ORDER — CLONAZEPAM 1 MG/1
TABLET ORAL
Qty: 60 TABLET | Refills: 0 | Status: CANCELLED | OUTPATIENT
Start: 2021-02-18 | End: 2021-03-20

## 2021-02-18 RX ORDER — CLONAZEPAM 1 MG/1
1 TABLET ORAL 2 TIMES DAILY PRN
Qty: 60 TABLET | Refills: 0 | Status: SHIPPED | OUTPATIENT
Start: 2021-02-18 | End: 2021-03-24

## 2021-02-18 NOTE — TELEPHONE ENCOUNTER
Refill Request - Controlled Substance    Last Seen: 1/7/2021       Last Written: 01/21/2021 #60 with 0 refills    Last UDS: 05/08/2020    Med Agreement Signed On: 05/08/2020    Next Appointment: 4/8/2021    Future appointment scheduled      Patient called in requesting this refill     Requested Prescriptions     Pending Prescriptions Disp Refills    clonazePAM (KLONOPIN) 1 MG tablet 60 tablet 0

## 2021-03-22 DIAGNOSIS — F41.9 ANXIETY: ICD-10-CM

## 2021-03-22 NOTE — TELEPHONE ENCOUNTER
Refill Request - Controlled Substance    Last Seen: 1/7/2021    Last Written: 2/18/21- 60 tab with 0 ref    Last UDS: 5/8/20    Med Agreement Signed On: 11/11/13    Next Appointment: 4/8/2021    Future appointment scheduled    Requested Prescriptions     Pending Prescriptions Disp Refills    clonazePAM (KLONOPIN) 1 MG tablet [Pharmacy Med Name: clonazePAM 1 MG TABLET] 60 tablet 0     Sig: TAKE ONE TABLET BY MOUTH TWICE A DAY AS NEEDED FOR ANXIETY FOR UP TO 30 DAYS

## 2021-03-24 RX ORDER — CLONAZEPAM 1 MG/1
TABLET ORAL
Qty: 60 TABLET | Refills: 0 | Status: SHIPPED | OUTPATIENT
Start: 2021-03-24 | End: 2021-04-20 | Stop reason: SDUPTHER

## 2021-03-25 ENCOUNTER — VIRTUAL VISIT (OUTPATIENT)
Dept: FAMILY MEDICINE CLINIC | Age: 56
End: 2021-03-25
Payer: MEDICARE

## 2021-03-25 DIAGNOSIS — F33.41 DEPRESSION, MAJOR, RECURRENT, IN PARTIAL REMISSION (HCC): ICD-10-CM

## 2021-03-25 DIAGNOSIS — F41.9 ANXIETY: Primary | ICD-10-CM

## 2021-03-25 DIAGNOSIS — F32.89 OTHER DEPRESSION: ICD-10-CM

## 2021-03-25 PROCEDURE — 99443 PR PHYS/QHP TELEPHONE EVALUATION 21-30 MIN: CPT | Performed by: INTERNAL MEDICINE

## 2021-03-25 NOTE — PROGRESS NOTES
[address] Internal Medicine  Progress Note  Mat Wu. Rosa Bertrand MD, MPH     Assessment/Plan telephone visit    Karson Chavez was seen today for 3 month follow-up and anxiety. New to this provider    Diagnoses and all orders for this visit:        Anxiety  Long standing with depression, since diagnosed with retroperitoneal fibrosis and abd pain  Relief with clonazepam which he takes BID  rf written yesterday- pr unaware . OAR[place] reviewed, also on Percocet which he gets from Dr Junior Zavala in 3 months    Depression, major, recurrent, in partial remission ([facility] [address]  Stable, reports no new symptoms  Consider referral to pscychologist ( if not done previously)        Discussed medications with patient, who voiced understanding of their use and indications. All questions answered. Return in about 3 months (around 6/25/2021), or in office. Current patient is being evaluated by a Virtual Visit (video visit) telephone only encounter to address concerns as mentioned above. A caregiver was present when appropriate. Due to this being a TeleHealth encounter (During RIRKO-71 public health emergency), evaluation of the following organ systems was limited: Vitals/Constitutional/EENT/Resp/CV/GI//MS/Neuro/Skin/Heme-Lymph-Imm. Pursuant to the emergency declaration under the [facility], [address] [place] [address] authority and the [employer] Act, this Virtual Visit was conducted with patient's (and/or legal guardian's) consent, to reduce the patient's risk of exposure to COVID-19 and provide necessary medical care. The patient (and/or legal guardian) has also been advised to contact this office for worsening conditions or problems, and seek emergency medical treatment and/or call 911 if deemed necessary.      Patient identification was verified at the start of the visit: Yes    Total time spent for this encounter: 30 minutes    Services were provided through a video synchronous discussion virtually to substitute for in-person clinic visit. Patient and provider were located at their individual homes. Ozzie Espinosa   YOB: 1965    Date of Visit:  3/25/2021    Allergies   Allergen Reactions    Citalopram Hydrobromide Nausea Only    Cymbalta [Duloxetine Hcl] Other (See Comments)     headache    Darvocet [Propoxyphene N-Acetaminophen] Nausea Only    Escitalopram Oxalate Nausea Only    Lyrica [Pregabalin] Other (See Comments)     Depression suicidal    Morphine Other (See Comments)     Felt strange/\"loopy\"    Neurontin [Gabapentin] Nausea Only    Oxycodone     Protonix [Pantoprazole Sodium Sesquihydrate] Other (See Comments)     headache     Outpatient Medications Marked as Taking for the 3/25/21 encounter (Virtual Visit) with Lidia Howe MD   Medication Sig Dispense Refill    clonazePAM (KLONOPIN) 1 MG tablet TAKE ONE TABLET BY MOUTH TWICE A DAY AS NEEDED FOR ANXIETY FOR UP TO 30 DAYS 60 tablet 0    QUEtiapine (SEROQUEL) 400 MG tablet TAKE ONE TABLET BY MOUTH TWICE A DAY AS DIRECTED 60 tablet 4    oxyCODONE-acetaminophen (PERCOCET) 7.5-325 MG per tablet Take 4 tablets by mouth daily.  ergocalciferol (DRISDOL) 1.25 MG (98046 UT) capsule Take 1 capsule by mouth twice a week (for 6 months total) for Vitamin D deficiency. 8 capsule 5    tiZANidine (ZANAFLEX) 4 MG tablet Take 1 tablet by mouth every 8 hours as needed (pain) MUST LAST 30 DAYS** 90 tablet 2    fenofibrate (TRIGLIDE) 160 MG tablet Take 1 tablet by mouth daily 90 tablet 1    levothyroxine (SYNTHROID) 88 MCG tablet Take 1 tablet by mouth every day (instead of the 75mcg dose) for underactive thyroid. 30 tablet 12       Chief Complaint   Patient presents with    3 Month Follow-Up    Anxiety   per MA triage  HPI  Pt is here for follow up of chronic medical problems: anxiety.  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 Depressed since he cannot do his usual activity. watches TV, takes care of dogs. Walks to keep legs working. + fatigue. Depressed about everything for many years. Mood Disorder:  Patient presents for follow-up of depression and anxiety disorder. Current complaints include: anhedonia, depressed mood, feelings of hopelessness and insomnia, fatigue, no s/h ideation. Suicaidal thoughts when he took Lyrica . He denies suicidal thoughts or behavior. Symptoms/signs of nehemiah: none. External stressors: nothing new. Current treatment includes: benzodiazepine- clonazepam 1 BID, seroquel 800 mg QHS. Medication side effects: none. Panic attacks when sitting in bathtub or in enclosed locations. monthly  No Covid  No recurrence of melena  Has not scheduled with GI  No Etoh. Son, 35 lives with him     Review of Systems  As documented in HPI     There were no vitals filed for this visit. There is no height or weight on file to calculate BMI. Wt Readings from Last 3 Encounters:   09/25/20 (!) 307 lb (139.3 kg)   08/24/20 (!) 307 lb (139.3 kg)   05/06/20 (!) 305 lb (138.3 kg)     BP Readings from Last 3 Encounters:   09/25/20 122/68   08/24/20 134/74   01/03/20 120/68      Telephone visit -   Physical Exam  Pulmonary:      Effort: Pulmonary effort is normal.   Neurological:      Mental Status: He is alert.    Psychiatric:      Comments: NAD  Speech and mood sluggish

## 2021-03-25 NOTE — ASSESSMENT & PLAN NOTE
Long standing with depression, since diagnosed with retroperitoneal fibrosis and abd pain  Relief with clonazepam which he takes BID  rf written yesterday- pr unaware .   OARRS reviewed, also on Percocet which he gets from Dr Shell Benito in 3 months

## 2021-03-25 NOTE — PROGRESS NOTES
Staff, please call to help get scheduled for covid vaccine. And if interested please give scheduling information for Dr. Ifeoma Tam.  Thank you

## 2021-03-26 ENCOUNTER — TELEPHONE (OUTPATIENT)
Dept: FAMILY MEDICINE CLINIC | Age: 56
End: 2021-03-26

## 2021-03-26 NOTE — TELEPHONE ENCOUNTER
Pt called back. He scheduled his first Covid vaccine for Monday 03/29 at [facility]   Pt does not want to schedule with Dr. Linda Avila at this time.

## 2021-03-26 NOTE — TELEPHONE ENCOUNTER
----- Message from Afshin Tovar MD sent at 3/25/2021 11:47 AM EDT -----  Can you please call him to assist in scheduling a covid vaccineAlso suggest he [facility] to [facility]- psychologist ( I am not sure if he has previously) for help with coping with depression and anxiety

## 2021-03-30 ENCOUNTER — IMMUNIZATION (OUTPATIENT)
Dept: PRIMARY CARE CLINIC | Age: 56
End: 2021-03-30
Payer: MEDICARE

## 2021-03-30 PROCEDURE — 0001A COVID-19, PFIZER VACCINE 30MCG/0.3ML DOSE: CPT | Performed by: FAMILY MEDICINE

## 2021-03-30 PROCEDURE — 91300 COVID-19, PFIZER VACCINE 30MCG/0.3ML DOSE: CPT | Performed by: FAMILY MEDICINE

## 2021-04-20 ENCOUNTER — IMMUNIZATION (OUTPATIENT)
Dept: PRIMARY CARE CLINIC | Age: 56
End: 2021-04-20
Payer: MEDICARE

## 2021-04-20 DIAGNOSIS — F41.9 ANXIETY: ICD-10-CM

## 2021-04-20 PROCEDURE — 91300 COVID-19, PFIZER VACCINE 30MCG/0.3ML DOSE: CPT | Performed by: FAMILY MEDICINE

## 2021-04-20 PROCEDURE — 0002A COVID-19, PFIZER VACCINE 30MCG/0.3ML DOSE: CPT | Performed by: FAMILY MEDICINE

## 2021-04-20 NOTE — TELEPHONE ENCOUNTER
Last office visit 3/25/2021     Last written 3-    Next office visit scheduled Not scheduled    Requested Prescriptions     Pending Prescriptions Disp Refills    clonazePAM (KLONOPIN) 1 MG tablet 60 tablet 0     Sig: TAKE ONE TABLET BY MOUTH TWICE A DAY AS NEEDED FOR ANXIETY FOR UP TO 30 DAYS

## 2021-04-21 DIAGNOSIS — F41.9 ANXIETY: ICD-10-CM

## 2021-04-21 NOTE — TELEPHONE ENCOUNTER
Last UDS 5-8-2020 5-8-2020 Med contract     Last office visit 3/25/2021     Last written 3-24-21 60 with 0      Next office visit scheduled Visit date not found    Requested Prescriptions     Pending Prescriptions Disp Refills    clonazePAM (KLONOPIN) 1 MG tablet 60 tablet 0     Sig: TAKE ONE TABLET BY MOUTH TWICE A DAY AS NEEDED FOR ANXIETY FOR UP TO 30 DAYS

## 2021-04-22 RX ORDER — CLONAZEPAM 1 MG/1
TABLET ORAL
Qty: 60 TABLET | Refills: 0 | Status: SHIPPED | OUTPATIENT
Start: 2021-04-22 | End: 2021-05-17

## 2021-04-22 RX ORDER — CLONAZEPAM 1 MG/1
TABLET ORAL
Qty: 60 TABLET | Refills: 0 | OUTPATIENT
Start: 2021-04-22 | End: 2021-05-21

## 2021-05-17 DIAGNOSIS — F41.9 ANXIETY: ICD-10-CM

## 2021-05-17 NOTE — TELEPHONE ENCOUNTER
Last office visit 3/25/2021     Last written 4-    Next office visit scheduled  Not scheduled    Requested Prescriptions     Pending Prescriptions Disp Refills    clonazePAM (KLONOPIN) 1 MG tablet [Pharmacy Med Name: clonazePAM 1 MG TABLET] 60 tablet 0     Sig: TAKE ONE TABLET BY MOUTH TWICE A DAY AS NEEDED FOR ANXIETY

## 2021-05-18 RX ORDER — CLONAZEPAM 1 MG/1
TABLET ORAL
Qty: 60 TABLET | Refills: 0 | Status: SHIPPED | OUTPATIENT
Start: 2021-05-18 | End: 2021-06-18

## 2021-06-18 DIAGNOSIS — F41.9 ANXIETY: ICD-10-CM

## 2021-06-18 RX ORDER — CLONAZEPAM 1 MG/1
TABLET ORAL
Qty: 60 TABLET | Refills: 0 | Status: SHIPPED | OUTPATIENT
Start: 2021-06-18 | End: 2021-07-19

## 2021-06-18 NOTE — TELEPHONE ENCOUNTER
Refill Request - Controlled Substance    Last Seen Department: 3/25/2021  Last Seen by PCP: 1/7/2021    Last Written: 5/18/2021    Last UDS: 5/8/2020    Med Agreement Signed On: 5/8/2020    Next Appointment: Visit date not found    Message to [employer] to schedule appointment.        Requested Prescriptions     Pending Prescriptions Disp Refills    clonazePAM (KLONOPIN) 1 MG tablet [Pharmacy Med Name: clonazePAM 1 MG TABLET] 60 tablet 0     Sig: TAKE ONE TABLET BY MOUTH TWICE A DAY AS NEEDED FOR ANXIETY

## 2021-06-28 ENCOUNTER — TELEPHONE (OUTPATIENT)
Dept: FAMILY MEDICINE CLINIC | Age: 56
End: 2021-06-28

## 2021-06-28 ENCOUNTER — HOSPITAL ENCOUNTER (OUTPATIENT)
Age: 56
Discharge: HOME | End: 2021-06-28
Payer: MEDICARE

## 2021-06-28 ENCOUNTER — OFFICE VISIT (OUTPATIENT)
Dept: FAMILY MEDICINE CLINIC | Age: 56
End: 2021-06-28
Payer: MEDICARE

## 2021-06-28 ENCOUNTER — HOSPITAL ENCOUNTER (OUTPATIENT)
Dept: GENERAL RADIOLOGY | Age: 56
Discharge: HOME | End: 2021-06-28
Payer: MEDICARE

## 2021-06-28 VITALS
OXYGEN SATURATION: 96 % | HEIGHT: 72 IN | TEMPERATURE: 97.6 F | BODY MASS INDEX: 40.31 KG/M2 | HEART RATE: 110 BPM | WEIGHT: 297.6 LBS | SYSTOLIC BLOOD PRESSURE: 142 MMHG | DIASTOLIC BLOOD PRESSURE: 84 MMHG

## 2021-06-28 DIAGNOSIS — K59.03 DRUG-INDUCED CONSTIPATION: ICD-10-CM

## 2021-06-28 DIAGNOSIS — F41.9 ANXIETY: ICD-10-CM

## 2021-06-28 DIAGNOSIS — F33.41 DEPRESSION, MAJOR, RECURRENT, IN PARTIAL REMISSION (HCC): ICD-10-CM

## 2021-06-28 DIAGNOSIS — N13.5 IDIOPATHIC RETROPERITONEAL FIBROSIS: Primary | ICD-10-CM

## 2021-06-28 DIAGNOSIS — Z12.11 COLON CANCER SCREENING: ICD-10-CM

## 2021-06-28 PROCEDURE — 74018 RADEX ABDOMEN 1 VIEW: CPT

## 2021-06-28 PROCEDURE — 99214 OFFICE O/P EST MOD 30 MIN: CPT | Performed by: PHYSICIAN ASSISTANT

## 2021-06-28 NOTE — TELEPHONE ENCOUNTER
----- Message from Shabana Calderon sent at 6/28/2021  9:46 AM EDT -----  Subject: Appointment Request    Reason for Call: Routine Existing Condition Follow Up    QUESTIONS  Type of Appointment? Established Patient  Reason for appointment request? Available appointments did not meet   patient need  Additional Information for Provider? Patient is rescheduling appointment   he was told to schedule in person I am unable to schedule in person with    I only show VV he would like to come in today after 1pm   6/28/2021 or 6/29/2021 if not he would like to see if the 7/1/2021   appointment time could be a in person. Pt can be reached at [phone]   okay to leave message on voicemail.   ---------------------------------------------------------------------------  --------------  CALL BACK INFO  What is the best way for the office to contact you? OK to leave message on   voicemail  Preferred Call Back Phone Number? [phone]  ---------------------------------------------------------------------------  --------------  SCRIPT ANSWERS  Relationship to Patient? Self  Have your symptoms changed? No  Appointment reason? Well Care/Follow Ups  Select a Well Care/Follow Ups appointment reason? Adult Existing Condition   Follow Up [Diabetes, CHF, COPD, Hypertension/Blood Pressure Check]  (Is the patient requesting to be seen urgently for their symptoms?)? No  Is this follow up request related to routine Diabetes Management? No  Are you having any new concerns about your existing condition? No  Have you been diagnosed with, awaiting test results for, or told that you   are suspected of having COVID-19 (Coronavirus)? (If patient has tested   negative or was tested as a requirement for work, school, or travel and   not based on symptoms, answer no)? No  Do you currently have flu-like symptoms including fever or chills, cough,   shortness of breath, difficulty breathing, or new loss of taste or smell?    No  Have you had close contact with someone with COVID-19 in the last 14 days? No  (Service Expert  click yes below to proceed with [employer] As Usual   Scheduling)?  Yes

## 2021-06-28 NOTE — TELEPHONE ENCOUNTER
Patient attempting to reschedule for this afternoon, there is a provider fill available at 1:40 pm please advise if you would like to use.      can you reach out to patient since scheduled this morning at 10:40am

## 2021-06-28 NOTE — PROGRESS NOTES
2021  Tyra Parker (: 1965)  63 y.o. HPI    Patient returns for Chronic condition follow up. Anxiety: stable on klonopin. Worsening depression 2/2 to pain. No interest in doing things. No motivation. Poor sleep 2/2 to pain. Appetite ok. Denies si/hi. Chronic pain from retroperitoneal fibrosis remains patient's primary concern. States he was d/c from his previous pain management provider for taking a suboxone due to uncontrolled pain. Patient states he does not do this regularly. Denies recent use. Continues with constipation from chronic narcotic use. Last BM last week. Has started using colace and miralax with some improvement. Pt denies urinary sxs including decreased stream and decreased UOP. Last CT abd/pelvis   IMPRESSION:   No significant change in the extent or degree of retroperitoneal fibrosis   surrounding the infrarenal abdominal aorta and common iliac arteries.  Again   noted is medial deviation of the ureters and diminutive caliber of the distal   most aorta and common iliac arteries. Review of Systems   Constitutional: Positive for activity change. Negative for chills and fever. HENT: Negative for congestion, ear pain, rhinorrhea and sore throat. Eyes: Negative for visual disturbance. Respiratory: Negative for cough and shortness of breath. Cardiovascular: Negative for chest pain and palpitations. Gastrointestinal: Positive for abdominal pain. Negative for constipation, diarrhea, nausea and vomiting. Genitourinary: Negative for difficulty urinating and dysuria. Musculoskeletal: Positive for back pain. Negative for arthralgias and myalgias. Skin: Negative for rash. Neurological: Negative for dizziness, weakness and numbness. Psychiatric/Behavioral: Positive for decreased concentration, dysphoric mood and sleep disturbance. The patient is nervous/anxious.         Allergies, past medical history, family history, and social history reviewed and unchanged from previous encounter. Current Outpatient Medications   Medication Sig Dispense Refill    sertraline (ZOLOFT) 50 MG tablet Take 1 tablet by mouth daily 90 tablet 1    linaclotide (LINZESS) 145 MCG capsule Take 1 capsule by mouth every morning (before breakfast) 30 capsule 5    clonazePAM (KLONOPIN) 1 MG tablet TAKE ONE TABLET BY MOUTH TWICE A DAY AS NEEDED FOR ANXIETY 60 tablet 0    levothyroxine (SYNTHROID) 88 MCG tablet TAKE ONE TABLET BY MOUTH DAILY 90 tablet 1    QUEtiapine (SEROQUEL) 400 MG tablet TAKE ONE TABLET BY MOUTH TWICE A DAY AS DIRECTED 60 tablet 4    oxyCODONE-acetaminophen (PERCOCET) 7.5-325 MG per tablet Take 4 tablets by mouth daily.  ergocalciferol (DRISDOL) 1.25 MG (69611 UT) capsule Take 1 capsule by mouth twice a week (for 6 months total) for Vitamin D deficiency. 8 capsule 5    tiZANidine (ZANAFLEX) 4 MG tablet Take 1 tablet by mouth every 8 hours as needed (pain) MUST LAST 30 DAYS** 90 tablet 2    fenofibrate (TRIGLIDE) 160 MG tablet Take 1 tablet by mouth daily 90 tablet 1    naloxone 4 MG/0.1ML LIQD nasal spray 1 spray by Nasal route as needed for Opioid Reversal 1 each 5     No current facility-administered medications for this visit. Vitals:    06/28/21 1451   BP: (!) 142/84   Site: Right Upper Arm   Position: Sitting   Cuff Size: Large Adult   Pulse: 110   Temp: 97.6 °F (36.4 °C)   TempSrc: Oral   SpO2: 96%   Weight: 297 lb 9.6 oz (135 kg)   Height: 6' (1.829 m)     Estimated body mass index is 40.36 kg/m² as calculated from the following:    Height as of this encounter: 6' (1.829 m). Weight as of this encounter: 297 lb 9.6 oz (135 kg). Physical Exam  Constitutional:       General: He is not in acute distress. Appearance: He is well-developed. He is obese. HENT:      Head: Normocephalic and atraumatic. Eyes:      Conjunctiva/sclera: Conjunctivae normal.      Pupils: Pupils are equal, round, and reactive to light.    Cardiovascular: Rate and Rhythm: Normal rate and regular rhythm. Heart sounds: Normal heart sounds. No murmur heard. Pulmonary:      Effort: Pulmonary effort is normal.      Breath sounds: Normal breath sounds. No wheezing. Musculoskeletal:      Cervical back: Neck supple. Lymphadenopathy:      Cervical: No cervical adenopathy. Skin:     General: Skin is warm and dry. Findings: No rash. Neurological:      Mental Status: He is alert and oriented to person, place, and time. ASSESSMENT and PLAN:  Madison Hernández was seen today for discuss medications. Diagnoses and all orders for this visit:    Idiopathic retroperitoneal fibrosis  -     Francine Fontaine, JAMIE, Pain Management, [place]  - needs new PM referral   - Will need to update CT abd/pelvis will discuss at 4-6 week follow up    Depression, major, recurrent, in partial remission (HCC)  -     sertraline (ZOLOFT) 50 MG tablet; Take 1 tablet by mouth daily  - trial zoloft in addition to klonopin  - follow up 4-6 weeks     Drug-induced constipation  -     XR ABDOMEN (KUB) (SINGLE AP VIEW); Future  -     linaclotide (LINZESS) 145 MCG capsule; Take 1 capsule by mouth every morning (before breakfast)    Colon cancer screening  -     Luna Hill MD, Gastroenterology, [facility]    Anxiety  - oarrs reviewed taking appropriately, continue klonopin. - is aware of risks associated with the use of both narcotic and benzo     Return in about 6 weeks (around 8/9/2021) for Depression, Annual Exam- instructed to come fasting.

## 2021-07-13 ASSESSMENT — ENCOUNTER SYMPTOMS
BACK PAIN: 1
RHINORRHEA: 0
SORE THROAT: 0
VOMITING: 0
NAUSEA: 0
COUGH: 0
DIARRHEA: 0
ABDOMINAL PAIN: 1
CONSTIPATION: 0
SHORTNESS OF BREATH: 0

## 2021-07-15 ENCOUNTER — TELEPHONE (OUTPATIENT)
Dept: FAMILY MEDICINE CLINIC | Age: 56
End: 2021-07-15

## 2021-07-15 DIAGNOSIS — R10.32 LEFT GROIN PAIN: ICD-10-CM

## 2021-07-15 RX ORDER — TIZANIDINE 4 MG/1
4 TABLET ORAL EVERY 8 HOURS PRN
Qty: 90 TABLET | Refills: 2 | Status: SHIPPED | OUTPATIENT
Start: 2021-07-15 | End: 2021-08-02 | Stop reason: SDUPTHER

## 2021-07-15 NOTE — TELEPHONE ENCOUNTER
Refill Request     Last Seen: Last Seen Department: 6/28/2021  Last Seen by PCP: 6/28/2021    Last Written: 5/7/2020    Next Appointment:   Future Appointments   Date Time Provider Ryan Davenport   8/9/2021  3:20 PM BRIANNA Milian [place] [id number]   9/9/2021 10:20 AM RAUL Hutchins CNP [facility]       Future appointment scheduled      Requested Prescriptions      No prescriptions requested or ordered in this encounter

## 2021-07-19 DIAGNOSIS — F41.9 ANXIETY: ICD-10-CM

## 2021-07-19 NOTE — TELEPHONE ENCOUNTER
Refill Request - Controlled Substance    Last Seen Department: 6/28/2021  Last Seen by PCP: 6/28/2021    Last Written: 6/18/2021    Last UDS: 5/8/2020    Med Agreement Signed On: 5/8/2020    Next Appointment: 8/9/2021    Future appointment scheduled    Requested Prescriptions     Pending Prescriptions Disp Refills    clonazePAM (KLONOPIN) 1 MG tablet [Pharmacy Med Name: clonazePAM 1 MG TABLET] 60 tablet 0     Sig: TAKE ONE TABLET BY MOUTH TWICE A DAY AS NEEDED FOR ANXIETY

## 2021-07-20 RX ORDER — CLONAZEPAM 1 MG/1
TABLET ORAL
Qty: 60 TABLET | Refills: 0 | Status: SHIPPED | OUTPATIENT
Start: 2021-07-20 | End: 2021-08-19 | Stop reason: SDUPTHER

## 2021-08-02 ENCOUNTER — TELEPHONE (OUTPATIENT)
Dept: FAMILY MEDICINE CLINIC | Age: 56
End: 2021-08-02

## 2021-08-02 DIAGNOSIS — R10.32 LEFT GROIN PAIN: ICD-10-CM

## 2021-08-02 RX ORDER — TIZANIDINE 4 MG/1
4 TABLET ORAL EVERY 4 HOURS PRN
Qty: 180 TABLET | Refills: 0 | Status: SHIPPED | OUTPATIENT
Start: 2021-08-02 | End: 2021-08-19 | Stop reason: SDUPTHER

## 2021-08-02 NOTE — TELEPHONE ENCOUNTER
Maximum recommended daily dose of this medication is 24 mg daily- which is 6 tablets a day, one tablet every 4 hours. I will adjust the script and re-send.

## 2021-08-02 NOTE — TELEPHONE ENCOUNTER
Pt calling asking to have his Tizanidine refilled, I confirmed with pt that he was out of his 90 day that was sent in on 7/15 and the pharmacy wont refill it until its 30 days. Pt stated that the rx has always been 270 tablets that he takes 9 a day.  Please Advise

## 2021-08-19 ENCOUNTER — OFFICE VISIT (OUTPATIENT)
Dept: FAMILY MEDICINE CLINIC | Age: 56
End: 2021-08-19
Payer: MEDICARE

## 2021-08-19 VITALS
WEIGHT: 293 LBS | HEIGHT: 72 IN | DIASTOLIC BLOOD PRESSURE: 72 MMHG | OXYGEN SATURATION: 96 % | HEART RATE: 102 BPM | SYSTOLIC BLOOD PRESSURE: 110 MMHG | BODY MASS INDEX: 39.68 KG/M2 | TEMPERATURE: 98.2 F

## 2021-08-19 DIAGNOSIS — F41.9 ANXIETY: ICD-10-CM

## 2021-08-19 DIAGNOSIS — R10.32 LEFT GROIN PAIN: ICD-10-CM

## 2021-08-19 DIAGNOSIS — R73.01 IMPAIRED FASTING GLUCOSE: ICD-10-CM

## 2021-08-19 DIAGNOSIS — F33.41 DEPRESSION, MAJOR, RECURRENT, IN PARTIAL REMISSION (HCC): ICD-10-CM

## 2021-08-19 DIAGNOSIS — N13.5 IDIOPATHIC RETROPERITONEAL FIBROSIS: Primary | ICD-10-CM

## 2021-08-19 DIAGNOSIS — F33.1 MODERATE EPISODE OF RECURRENT MAJOR DEPRESSIVE DISORDER (HCC): ICD-10-CM

## 2021-08-19 DIAGNOSIS — E03.9 HYPOTHYROIDISM, UNSPECIFIED TYPE: ICD-10-CM

## 2021-08-19 DIAGNOSIS — Z00.00 ROUTINE GENERAL MEDICAL EXAMINATION AT A HEALTH CARE FACILITY: ICD-10-CM

## 2021-08-19 PROCEDURE — G0438 PPPS, INITIAL VISIT: HCPCS | Performed by: PHYSICIAN ASSISTANT

## 2021-08-19 RX ORDER — QUETIAPINE FUMARATE 400 MG/1
400 TABLET, FILM COATED ORAL 2 TIMES DAILY
Qty: 180 TABLET | Refills: 1 | Status: SHIPPED | OUTPATIENT
Start: 2021-08-19 | End: 2022-02-17

## 2021-08-19 RX ORDER — DULOXETIN HYDROCHLORIDE 30 MG/1
30 CAPSULE, DELAYED RELEASE ORAL DAILY
Qty: 30 CAPSULE | Refills: 5 | Status: SHIPPED | OUTPATIENT
Start: 2021-08-19 | End: 2022-09-25

## 2021-08-19 RX ORDER — CLONAZEPAM 1 MG/1
1 TABLET ORAL 2 TIMES DAILY PRN
Qty: 60 TABLET | Refills: 0 | Status: SHIPPED | OUTPATIENT
Start: 2021-08-19 | End: 2021-09-29

## 2021-08-19 RX ORDER — TIZANIDINE 4 MG/1
4 TABLET ORAL EVERY 4 HOURS PRN
Qty: 180 TABLET | Refills: 0 | Status: SHIPPED | OUTPATIENT
Start: 2021-08-19 | End: 2021-09-22

## 2021-08-19 RX ORDER — CYCLOBENZAPRINE HCL 5 MG
5 TABLET ORAL 3 TIMES DAILY PRN
Qty: 42 TABLET | Refills: 0 | Status: SHIPPED | OUTPATIENT
Start: 2021-08-19 | End: 2021-09-02

## 2021-08-19 ASSESSMENT — PATIENT HEALTH QUESTIONNAIRE - PHQ9
SUM OF ALL RESPONSES TO PHQ QUESTIONS 1-9: 7
5. POOR APPETITE OR OVEREATING: 0
2. FEELING DOWN, DEPRESSED OR HOPELESS: 3
6. FEELING BAD ABOUT YOURSELF - OR THAT YOU ARE A FAILURE OR HAVE LET YOURSELF OR YOUR FAMILY DOWN: 3
4. FEELING TIRED OR HAVING LITTLE ENERGY: 0
SUM OF ALL RESPONSES TO PHQ9 QUESTIONS 1 & 2: 3
9. THOUGHTS THAT YOU WOULD BE BETTER OFF DEAD, OR OF HURTING YOURSELF: 0
SUM OF ALL RESPONSES TO PHQ QUESTIONS 1-9: 7
SUM OF ALL RESPONSES TO PHQ QUESTIONS 1-9: 7
7. TROUBLE CONCENTRATING ON THINGS, SUCH AS READING THE NEWSPAPER OR WATCHING TELEVISION: 1
8. MOVING OR SPEAKING SO SLOWLY THAT OTHER PEOPLE COULD HAVE NOTICED. OR THE OPPOSITE, BEING SO FIGETY OR RESTLESS THAT YOU HAVE BEEN MOVING AROUND A LOT MORE THAN USUAL: 0
3. TROUBLE FALLING OR STAYING ASLEEP: 0
1. LITTLE INTEREST OR PLEASURE IN DOING THINGS: 0
10. IF YOU CHECKED OFF ANY PROBLEMS, HOW DIFFICULT HAVE THESE PROBLEMS MADE IT FOR YOU TO DO YOUR WORK, TAKE CARE OF THINGS AT HOME, OR GET ALONG WITH OTHER PEOPLE: 1

## 2021-08-19 ASSESSMENT — LIFESTYLE VARIABLES: HOW OFTEN DO YOU HAVE A DRINK CONTAINING ALCOHOL: 0

## 2021-08-19 NOTE — PROGRESS NOTES
Medicare Annual Wellness Visit  Name: Sunil Duggna Date: 2021   MRN: <[id number]> Sex: Male   Age: 61 y.o. Ethnicity: Non- / Non    : 1965 Race: White (non-)      Jessica Steward is here for Medicare AWV    Screenings for behavioral, psychosocial and functional/safety risks, and cognitive dysfunction are all negative except as indicated below. These results, as well as other patient data from the [address] form, are documented in Flowsheets linked to this Encounter. Allergies   Allergen Reactions    Citalopram Hydrobromide Nausea Only    Cymbalta [Duloxetine Hcl] Other (See Comments)     headache    Darvocet [Propoxyphene N-Acetaminophen] Nausea Only    Escitalopram Oxalate Nausea Only    Lyrica [Pregabalin] Other (See Comments)     Depression suicidal    Morphine Other (See Comments)     Felt strange/\"loopy\"    Neurontin [Gabapentin] Nausea Only    Oxycodone     Protonix [Pantoprazole Sodium Sesquihydrate] Other (See Comments)     headache       Prior to Visit Medications    Medication Sig Taking? Authorizing Provider   tiZANidine (ZANAFLEX) 4 MG tablet Take 1 tablet by mouth every 4 hours as needed (pain) MUST LAST 30 DAYS** Yes BRIANNA Corbin   clonazePAM (KLONOPIN) 1 MG tablet TAKE ONE TABLET BY MOUTH TWICE A DAY AS NEEDED FOR ANXIETY Yes RAUL Espana - CNP   linaclotide (LINZESS) 145 MCG capsule Take 1 capsule by mouth every morning (before breakfast) Yes BRIANNA Corbin   levothyroxine (SYNTHROID) 88 MCG tablet TAKE ONE TABLET BY MOUTH DAILY Yes BRIANNA Corbin   QUEtiapine (SEROQUEL) 400 MG tablet TAKE ONE TABLET BY MOUTH TWICE A DAY AS DIRECTED Yes BRIANNA Corbin   oxyCODONE-acetaminophen (PERCOCET) 7.5-325 MG per tablet Take 4 tablets by mouth daily. Yes Historical Provider, MD   ergocalciferol (DRISDOL) 1.25 MG (33634 UT) capsule Take 1 capsule by mouth twice a week (for 6 months total) for Vitamin D deficiency.  Yes BRIANNA Corbin   naloxone 4 MG/0.1ML LIQD nasal spray 1 spray by Nasal route as needed for Opioid Reversal Yes Lawson Hayward MD   sertraline (ZOLOFT) 50 MG tablet Take 1 tablet by mouth daily  Patient not taking: Reported on 8/19/2021  BRIANNA Corbin   fenofibrate (TRIGLIDE) 160 MG tablet Take 1 tablet by mouth daily  Patient not taking: Reported on 8/19/2021  BRIANNA Corbin       Past Medical History:   Diagnosis Date    Anxiety     Chronic back pain     Depression     Depression, major, recurrent, severe with psychosis 5/4/2012    Hydronephrosis of right kidney 7/12/2015    Hypothyroidism 9/24/2015    Retroperitoneal fibrosis 2006    TMJ dysfunction     Wears hearing aid        Past Surgical History:   Procedure Laterality Date    APPENDECTOMY      INNER EAR SURGERY      bilaterally - at least 5-6 surgeries with each one       Family History   Problem Relation Age of Onset    Diabetes Mother     High Blood Pressure Mother     High Cholesterol Mother     Cancer Father         Kidney and thyroid    Cancer Maternal Grandmother     Diabetes Paternal Aunt     Cancer Maternal Grandfather 61        colon       CareTeam (Including outside providers/suppliers regularly involved in providing care):   Patient Care Team:  BRIANNA Corbin as PCP - General (Physician Assistant)  BRIANNA Corbin as PCP - [facility] Empaneled Provider  Amanda Granado MD as Consulting Physician (Otolaryngology)    Wt Readings from Last 3 Encounters:   08/19/21 293 lb (132.9 kg)   06/28/21 297 lb 9.6 oz (135 kg)   09/25/20 (!) 307 lb (139.3 kg)     Vitals:    08/19/21 1037   BP: 110/72   Site: Right Upper Arm   Position: Sitting   Cuff Size: Large Adult   Pulse: 102   Temp: 98.2 °F (36.8 °C)   TempSrc: Oral   SpO2: 96%   Weight: 293 lb (132.9 kg)   Height: 6' (1.829 m)     Body mass index is 39.74 kg/m².     Based upon direct observation of the patient, evaluation of cognition reveals recent and remote memory intact. General Appearance: alert and oriented to person, place and time, well developed and well- nourished, in no acute distress  Skin: warm and dry, no rash or erythema  Head: normocephalic and atraumatic  Eyes: pupils equal, round, and reactive to light, extraocular eye movements intact, conjunctivae normal  ENT: tympanic membrane, external ear and ear canal normal bilaterally, nose without deformity, nasal mucosa and turbinates normal without polyps  Neck: supple and non-tender without mass, no thyromegaly or thyroid nodules, no cervical lymphadenopathy  Pulmonary/Chest: clear to auscultation bilaterally- no wheezes, rales or rhonchi, normal air movement, no respiratory distress  Cardiovascular: normal rate, regular rhythm, normal S1 and S2, no murmurs, rubs, clicks, or gallops, distal pulses intact, no carotid bruits  Abdomen: soft, non-tender, non-distended, normal bowel sounds, no masses or organomegaly  Extremities: no cyanosis, clubbing or edema  Musculoskeletal: normal range of motion, no joint swelling, deformity or tenderness  Neurologic: reflexes normal and symmetric, no cranial nerve deficit, gait, coordination and speech normal    Patient's complete Health Risk Assessment and screening values have been reviewed and are found in Flowsheets. The following problems were reviewed today and where indicated follow up appointments were made and/or referrals ordered.     Positive Risk Factor Screenings with Interventions:       Depression:  PHQ-2 Score: 3  PHQ-9 Total Score: 7    Severity:1-4 = minimal depression, 5-9 = mild depression, 10-14 = moderate depression, 15-19 = moderately severe depression, 20-27 = severe depression  Depression Interventions:  · LPN INTERVENTION GUIDE: SCORE 5-14 = MODERATE DEPRESSION: FOLLOW UP IN 1 WEEK  · Medication prescribed- duloxetine     Substance History:  Social History     Tobacco History     Smoking Status  Current Every Day Smoker Last attempt to quit  4/1/2010 Smoking Frequency  1 pack/day for 30 years (30 pk yrs)    Smokeless Tobacco Use  Never Used    Tobacco Comment  quit for 5 years about 2012          Alcohol History     Alcohol Use Status  No          Drug Use     Drug Use Status  No          Sexual Activity     Sexually Active  Not Currently               Alcohol Screening:       A score of 8 or more is associated with harmful or hazardous drinking. A score of 13 or more in women, and 15 or more in men, is likely to indicate alcohol dependence. Substance Abuse Interventions:  · Tobacco abuse:  tobacco cessation tips and resources provided    General Health and ACP:  General  In general, how would you say your health is?: Fair  In the past 7 days, have you experienced any of the following?  New or Increased Pain, New or Increased Fatigue, Loneliness, Social Isolation, Stress or Anger?: None of These  Do you get the social and emotional support that you need?: (!) No  Do you have a Living Will?: Yes  Advance Directives     Power of [address] Will ACP-Advance Directive ACP-Power of     Not on File Not on File Not on File Not on File      General Health Risk Interventions:  · Stress: medication prescribed- duloxetime and klonopin  · Inadequate social/emotional support: social work referral today- pt may qualify for medicaid/medicare    Health Habits/Nutrition:  Health Habits/Nutrition  Do you exercise for at least 20 minutes 2-3 times per week?: (!) No  Have you lost any weight without trying in the past 3 months?: (!) Yes  Do you eat only one meal per day?: (!) Yes  Have you seen the dentist within the past year?: (!) No  Body mass index: (!) 39.73  Health Habits/Nutrition Interventions:  · Inadequate physical activity:  patient is not ready to increase his/her physical activity level at this time, chronic pain  · Dental exam overdue:  patient encouraged to make appointment with his/her dentist    Hearing/Vision:  No exam data present  Hearing/Vision  Do you or your family notice any trouble with your hearing that hasn't been managed with hearing aids?: No  Do you have difficulty driving, watching TV, or doing any of your daily activities because of your eyesight?: (!) Yes  Have you had an eye exam within the past year?: (!) No  Hearing/Vision Interventions:  · Hearing concerns:  patient declines any further evaluation/treatment for hearing issues  · Vision concerns:  patient encouraged to make appointment with his/her eye specialist    Safety:  Safety  Do you have working smoke detectors?: Yes  Have all throw rugs been removed or fastened?: Yes  Do you have non-slip mats or surfaces in all bathtubs/showers?: (!) No  Do all of your stairways have a railing or banister?: Yes  Are your doorways, halls and stairs free of clutter?: Yes  Do you always fasten your seatbelt when you are in a car?: Yes  Safety Interventions:  · Home safety tips provided    ADL:  ADLs  In the past 7 days, did you need help from others to perform any of the following everyday activities? Eating, dressing, grooming, bathing, toileting, or walking/balance?: (!) Walking/Balance  In the past 7 days, did you need help from others to take care of any of the following?  Laundry, housekeeping, banking/finances, shopping, telephone use, food preparation, transportation, or taking medications?: (!) Laundry, Food Preparation, Housekeeping  ADL Interventions:  · ENT referral for balance issues    Personalized Preventive Plan   Current Health Maintenance Status  Immunization History   Administered Date(s) Administered    COVID-19, Pfizer, PF, 30mcg/0.3mL 03/30/2021, 04/20/2021    Influenza Virus Vaccine 10/09/2015    Influenza, Intradermal, Preservative free 10/11/2013, 01/20/2015    Influenza, Quadv, IM, PF (6 mo and older Fluzone, Flulaval, Fluarix, and 3 yrs and older Afluria) 01/19/2017, 09/21/2017, 09/24/2018, 11/07/2019, 09/25/2020        Health Maintenance   Topic Date Due    Hepatitis C screen Never done    Pneumococcal 0-64 years Vaccine (1 of 2 - PPSV23) Never done    HIV screen  Never done    DTaP/Tdap/Td vaccine (1 - Tdap) Never done    Colon cancer screen colonoscopy  Never done    Shingles Vaccine (1 of 2) Never done    Low dose CT lung screening  Never done    Annual Wellness Visit (AWV)  Never done    A1C test (Diabetic or Prediabetic)  08/24/2021    TSH testing  08/24/2021    Flu vaccine (1) 09/01/2021    Lipid screen  09/25/2025    COVID-19 Vaccine  Completed    Hepatitis A vaccine  Aged Out    Hepatitis B vaccine  Aged Out    Hib vaccine  Aged Out    Meningococcal (ACWY) vaccine  Aged Out     Recommendations for [employer] Due: see orders and patient instructions/AVS.  . Recommended screening schedule for the next 5-10 years is provided to the patient in written form: see Patient Instructions/AVS.    There are no diagnoses linked to this encounter.

## 2021-08-19 NOTE — TELEPHONE ENCOUNTER
Please let patient know   Script for klonopin sent. Insurance will not pay for tizanidine as it is too early for a refill. I have sent in an alternative muscle relaxer for him to take 1 tablet 3 times daily for the next 2 weeks until he can fill his tizanidine. He cannot take it more than 1 tablet 3 times daily.

## 2021-08-19 NOTE — TELEPHONE ENCOUNTER
Pt calling about his Tizanidine, pt stated that the pharmacy wont fill it for another 9 days and stated that the script was written for pt to take 6 a day and pt stated that its wrong because he takes 9 a day. Pt is asking if Jayde Hayes can resend in the script.  Please Advise

## 2021-08-19 NOTE — PATIENT INSTRUCTIONS
Personalized Preventive Plan for Geraldine Jain - 8/19/2021  Medicare offers a range of preventive health benefits. Some of the tests and screenings are paid in full while other may be subject to a deductible, co-insurance, and/or copay. Some of these benefits include a comprehensive review of your medical history including lifestyle, illnesses that may run in your family, and various assessments and screenings as appropriate. After reviewing your medical record and screening and assessments performed today your provider may have ordered immunizations, labs, imaging, and/or referrals for you. A list of these orders (if applicable) as well as your Preventive Care list are included within your After Visit Summary for your review. Other Preventive Recommendations:    · A preventive eye exam performed by an eye specialist is recommended every 1-2 years to screen for glaucoma; cataracts, macular degeneration, and other eye disorders. · A preventive dental visit is recommended every 6 months. · Try to get at least 150 minutes of exercise per week or 10,000 steps per day on a pedometer . · Order or download the FREE \"Exercise & Physical Activity: Your Everyday Guide\" from [employer]. Call 0-301.641.5002 or search [employer] online. · You need 1344-2664 mg of calcium and 6783-4556 IU of vitamin D per day. It is possible to meet your calcium requirement with diet alone, but a vitamin D supplement is usually necessary to meet this goal.  · When exposed to the sun, use a sunscreen that protects against both UVA and UVB radiation with an SPF of 30 or greater. Reapply every 2 to 3 hours or after sweating, drying off with a towel, or swimming. · Always wear a seat belt when traveling in a car. Always wear a helmet when riding a bicycle or motorcycle.

## 2021-08-19 NOTE — TELEPHONE ENCOUNTER
Maximum recommended daily dose of this medication is 24 mg daily- which is 6 tablets a day, one tablet every 4 hours. Patient should not be taking 9 tablets daily.

## 2021-08-19 NOTE — TELEPHONE ENCOUNTER
Spoke w pt. Stated the pharmacy will not release the medication to him for another 9 days. Spoke with pharmacy and they stayed he as 13 days till he can pick it up. Pt stated he needs the Tizanidine. He also needs his Klonopin refilled by tomorrow.  Tizanidine

## 2021-09-09 ENCOUNTER — OFFICE VISIT (OUTPATIENT)
Dept: PAIN MANAGEMENT | Age: 56
End: 2021-09-09
Payer: MEDICARE

## 2021-09-09 ENCOUNTER — TELEPHONE (OUTPATIENT)
Dept: ADMINISTRATIVE | Age: 56
End: 2021-09-09

## 2021-09-09 VITALS
BODY MASS INDEX: 39.68 KG/M2 | WEIGHT: 293 LBS | DIASTOLIC BLOOD PRESSURE: 70 MMHG | SYSTOLIC BLOOD PRESSURE: 112 MMHG | HEIGHT: 72 IN | HEART RATE: 101 BPM | OXYGEN SATURATION: 97 %

## 2021-09-09 DIAGNOSIS — R10.32 LEFT GROIN PAIN: ICD-10-CM

## 2021-09-09 DIAGNOSIS — N13.5 IDIOPATHIC RETROPERITONEAL FIBROSIS: ICD-10-CM

## 2021-09-09 DIAGNOSIS — G89.29 CHRONIC LEFT-SIDED LOW BACK PAIN, UNSPECIFIED WHETHER SCIATICA PRESENT: ICD-10-CM

## 2021-09-09 DIAGNOSIS — M54.50 CHRONIC LEFT-SIDED LOW BACK PAIN, UNSPECIFIED WHETHER SCIATICA PRESENT: ICD-10-CM

## 2021-09-09 DIAGNOSIS — G89.4 CHRONIC PAIN SYNDROME: ICD-10-CM

## 2021-09-09 DIAGNOSIS — F33.41 DEPRESSION, MAJOR, RECURRENT, IN PARTIAL REMISSION (HCC): ICD-10-CM

## 2021-09-09 DIAGNOSIS — F41.9 ANXIETY: ICD-10-CM

## 2021-09-09 DIAGNOSIS — R25.2 SPASM: ICD-10-CM

## 2021-09-09 DIAGNOSIS — E03.9 HYPOTHYROIDISM, UNSPECIFIED TYPE: ICD-10-CM

## 2021-09-09 PROCEDURE — 99203 OFFICE O/P NEW LOW 30 MIN: CPT | Performed by: NURSE PRACTITIONER

## 2021-09-09 RX ORDER — LIDOCAINE 50 MG/G
1 PATCH TOPICAL DAILY
Qty: 30 PATCH | Refills: 0 | Status: SHIPPED | OUTPATIENT
Start: 2021-09-09 | End: 2021-10-14 | Stop reason: SDUPTHER

## 2021-09-09 RX ORDER — OXCARBAZEPINE 150 MG/1
TABLET, FILM COATED ORAL
Qty: 60 TABLET | Refills: 0 | Status: SHIPPED | OUTPATIENT
Start: 2021-09-09 | End: 2021-10-08 | Stop reason: SDUPTHER

## 2021-09-09 NOTE — PATIENT INSTRUCTIONS
Patient Education        Use of Multiple Drugs: Care Instructions  Your Care Instructions     You have had treatment to help your body get rid of a combination of any of these drugs:  · Prescription medicines  · Over-the-counter medicines  · Alcohol  · Illegal drugs  Taking some drugs together may cause a bad reaction. They can have unexpected or stronger effects on your body and mind. For example, benzodiazepines (such as alprazolam and lorazepam) and alcohol both depress the nervous system. Taken together, each one is stronger than when it is taken by itself. You are getting better, but it takes time for the drugs to leave your body. It may take up to 2 weeks for your mind to clear and your mood to improve. Depending on the drugs you took, the doctor might have:  · Watched your symptoms or done tests to find out what drugs were in your body. · Treated you to control your breathing, blood pressure, and heart rate. · Tried to remove the drugs from your body by pumping your stomach or giving you a substance by mouth that absorbs chemicals. · Given you a substance that neutralizes chemicals (antidote). · Given you oxygen to help you breathe. · Given you fluids. The doctor also watched you carefully to make sure you were recovering safely. Follow-up care is a key part of your treatment and safety. Be sure to make and go to all appointments, and call your doctor if you are having problems. It's also a good idea to know your test results and keep a list of the medicines you take. How can you care for yourself at home? · Adopt healthy habits to ease withdrawal symptoms. When you use substances like alcohol and some drugs regularly, your body gets used to them. This is called physical dependence. If you are physically dependent on substances, you may have withdrawal symptoms when you stop taking them. These symptoms may include trembling, feeling restless, and sweating.  To help get past these:  ? Get plenty of rest.  ? Drink lots of fluids. ? Stay active. ? Eat a healthy diet. · Drink fluids to soothe a sore throat. If you had a tube in your throat to help you breathe, you may have a sore throat or hoarseness that can last a few days. Drinking fluids may help. · If you use opioids, ask your doctor or pharmacist about having a naloxone rescue kit on hand. · Get help to stop using drugs. Talk to your doctor about substance use treatment programs. When should you call for help? Call 911 anytime you think you may need emergency care. For example, call if:    · You feel you cannot stop from hurting yourself or someone else. Call your doctor now or seek immediate medical care if:    · You have new or worse symptoms of withdrawal, such as trembling, feeling restless, and sweating, that you can't manage at home. Watch closely for changes in your health, and be sure to contact your doctor if:    · You do not get better as expected.     · You need help finding the right place to get help with drug or alcohol problems. Where can you learn more? Go to https://Loc[employer]. org and sign in to your [employer] account. Enter B109 in the [facility] box to learn more about \"Use of Multiple Drugs: Care Instructions. \"     If you do not have an account, please click on the \"Sign Up Now\" link. Current as of: February 11, 2021               Content Version: 12.9  © 5968-3736 [employer], [employer]. Care instructions adapted under license by [facility] ([facility]). If you have questions about a medical condition or this instruction, always ask your healthcare professional. [place] [address] any warranty or liability for your use of this information.

## 2021-09-09 NOTE — TELEPHONE ENCOUNTER
Submitted PA for Lidocaine 5% patches,  Key: [id number]. Medication has been DENIED. I will scan in denial once received. Please notify patient. Thank you.

## 2021-09-09 NOTE — PROGRESS NOTES
HISTORY OF PRESENT ILLNESS:  Mr. Rosalba Gamez is a 61 y.o. male presents for consultation at the kind request of Noelle REED her primary care provider for chronic pain management. His presenting problems are pain in the left groin between his abdomen, and leg. Pain occasionally radiates to the left lower back. Onset of pain began in 2006 while working, pain was spontaneous without direct trauma or injury. 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 Pain was initially managed by his previous primary care physician Dr. Iqra Esquivel who prescribed oxycodone-acetaminophen  mg tablets 8 tablets a day, clonazepam 2 mg tablets twice a day. His PCP retired, he was then referred to pain management under the care of Dr. La Mack who discharged him after failed UDS positive for buprenorphine. Patient reports having failed UDS due to a reduction in the narcotics that were prescribed by his PCP, he received Suboxone from a friend. He was last dispensed Percocet 7.5-325 mg tablets on 4/19/2020 1 x 27 days enough until he was seen by pain management. Admits to weight gain over the years due to activity intolerance. Currently has no narcotics, has been taking different types of narcotics from his neighbor/friend. Denies having had physical therapy in the last 2 years. Other health conditions include hypothyroidism, history of DVTs, migraine headache, hearing loss. He rates the pain in the lower back/abdomen at 9/10. He describes it as numbness, tingling. Pain is greaterin his left groin than abdomen. Pain is made worse by: walking, standing, sitting, bending, twisting. Activities that have been limited by pain that he otherwise tolerated well are working. Alternative therapies he haspreviously attempted are pain medicine, anti-inflammatories, muscle relaxants. Current treatment regimen has helped relieve about 40% of the pain.  Relieving factors of pain include nothing. Hedenies side effects from the current pain regimen. In the last week he reports having very bothersome symptoms to the lower back/left groin most of the time with numbness tingling to the left lower extremity. Pain prevents him from dressing without severe pain, lifting heavy objects, walking more than 10 minutes at a time, sitting more than 1 hour, standing more than a few minutes. He essentially has no social/recreational life or sexual life due to pain. Patient reports mood is depressed and that he has no suicidal/homicidal inclination. depression is mainly due to inability to work, pain limitations. He was prescribed Cymbalta which he does not take due to cost.  Currently on clonazepam 1 mg tablets twice a day through his primary care provider. Sleep patterns are poor with an average of 2 hours on Seroquel 400 mg tabs. Patient denies neurological bowel or bladder concerns. Patient denies misusing/abusing his narcotic pain medications or using any illegal drugs. he admits to morning stiffness, fatigue, and headaches. Currently on disability, lives with his son. Smokes 1 pack of cigarettes a day, denies drinking alcohol. ROS:  The patient's social history, past medical history, family history, medications, allergies and review of systems have all been reviewed and verified from  the patient questionnaire form which has been filled by the patient. The  allergies, medication list  and past medical and surgical history and other information recorded by the MA was again reviewed today. Please check page 6 of the patient questionnaire for for family history  These forms have been scanned into the \"media\" tab of patients electronic medical record. PHYSICAL EXAM:  Please see the physical exam form for a detailed examination on this visit. This form has been completed and scanned into the  Media section of the chart      Physical Exam  Constitutional:       General: He is not in acute distress. Appearance: He is well-developed. HENT:      Head: Normocephalic and atraumatic. Nose: Nose normal.   Eyes:      Conjunctiva/sclera: Conjunctivae normal.      Pupils: Pupils are equal, round, and reactive to light. Neck:      Thyroid: No thyromegaly. Cardiovascular:      Rate and Rhythm: Normal rate and regular rhythm. Heart sounds: Normal heart sounds. Pulmonary:      Effort: Pulmonary effort is normal. No respiratory distress. Breath sounds: Normal breath sounds. Abdominal:      General: Bowel sounds are normal. There is no distension. Palpations: Abdomen is soft. Tenderness: There is abdominal tenderness (left lower quadrant with palpation). There is no right CVA tenderness or left CVA tenderness. Musculoskeletal:         General: Tenderness present. Right shoulder: Normal.      Cervical back: Neck supple. Lumbar back: Tenderness present. No deformity. Decreased range of motion (guarded pain with palpation to the left lumbar region, 40 degree flexion). Left hip: Tenderness present. Right lower leg: Edema (+2 itting edema BLE) present. Left lower leg: Edema (+2 itting edema BLE) present. Legs:       Comments: Tenderness with palpation of the left groin region   Lymphadenopathy:      Cervical: No cervical adenopathy. Skin:     General: Skin is warm and dry. Neurological:      Mental Status: He is alert and oriented to person, place, and time. Cranial Nerves: No cranial nerve deficit. Sensory: No sensory deficit. Motor: Weakness (LLE) present. Gait: Gait abnormal (slow steady, limping noted to the LLE). Deep Tendon Reflexes:      Reflex Scores:       Patellar reflexes are 1+ on the right side and 1+ on the left side. Achilles reflexes are 2+ on the right side and 2+ on the left side. Psychiatric:         Mood and Affect: Mood is anxious and depressed.          Speech: Speech normal.         Behavior: Behavior normal.        CT of the Abdomen 5/6/16:  No significant change in the extent or degree of retroperitoneal fibrosis   surrounding the infrarenal abdominal aorta and common iliac arteries.  Again   noted is medial deviation of the ureters and diminutive caliber of the distal   most aorta and common iliac arteries. /70   Pulse 101   Ht 6' (1.829 m)   Wt 293 lb (132.9 kg)   SpO2 97%   BMI 39.74 kg/m²      ASSESSMENT:    1. Chronic pain syndrome    2. Left groin pain    3. Idiopathic retroperitoneal fibrosis    4. Spasm, left groin    5. Chronic left-sided low back pain, unspecified whether sciatica present    6. Hypothyroidism, unspecified type    7. Depression, major, recurrent, in partial remission ([facility] [address]    8. Anxiety         PLAN:   -Chronic opiate treatment protocol was discussed withthe patient, informed consent was obtained. -Treatment guidelines were discussed and established  -Risks and benefits of narcotics were addressedwith the patient  - Obtainable long term and short term goals of opioid therapy were reviewed, including pain relief, sleep, psychosocial and physical functioning   -Obtain urine Toxicology today  -Ztlido 1.8% topical daily  -No opioids were prescribed for the patient today  -PT  -Reviewed previous imaging studies/blood work reviewed  -Xray of the Lumbar spine  -Informed the patient that he would have to pass UDS to be considered for opioids. He admitted to having taken opioid from a friend who seems to supply him with different types of narcotics. Reviewed pain contract as well as requirement to pass UDS/pill count while in pain management. Patient also has a history if noncompliance with opioid therapy.  He verbalized understanding, UDS will be completed today  -CBT techniques- relaxation therapies such as biofeedback, mindfulness based stress reduction, imagery, cognitive restructuring, problem solving discussed with patient  -He was advised weight reduction, diet changes- 800-1200 matilda diet, diet diary, exercising, nutritional  consult increased physical activity as tolerated  -Advised patient to quit smoking for  health related concerns and to improve the treatment outcomes. Education was given on quitting smoking and the use of different modalities including medications, hypnotherapy, counselling  and biofeedback. These were discussed with patient. -SOAPP score 10  -ORT score 7 high risk  -PHQ-9 score 23  -Return in about 4 weeks (around 10/7/2021). Controlled Substances Monitoring: Periodic Controlled Substance Monitoring: No signs of potential drug abuse or diversion identified.  Kaitlin Zee, APRN - CNP)

## 2021-09-17 DIAGNOSIS — R10.32 LEFT GROIN PAIN: ICD-10-CM

## 2021-09-22 RX ORDER — TIZANIDINE 4 MG/1
TABLET ORAL
Qty: 180 TABLET | Refills: 0 | Status: SHIPPED | OUTPATIENT
Start: 2021-09-22 | End: 2021-10-29

## 2021-09-24 ENCOUNTER — HOSPITAL ENCOUNTER (EMERGENCY)
Age: 56
Discharge: HOME | End: 2021-09-24
Payer: MEDICARE

## 2021-09-24 ENCOUNTER — APPOINTMENT (OUTPATIENT)
Dept: CT IMAGING | Age: 56
End: 2021-09-24
Payer: MEDICARE

## 2021-09-24 VITALS
TEMPERATURE: 98.4 F | SYSTOLIC BLOOD PRESSURE: 159 MMHG | HEART RATE: 75 BPM | WEIGHT: 295 LBS | HEIGHT: 71 IN | OXYGEN SATURATION: 96 % | BODY MASS INDEX: 41.3 KG/M2 | RESPIRATION RATE: 16 BRPM | DIASTOLIC BLOOD PRESSURE: 90 MMHG

## 2021-09-24 DIAGNOSIS — N39.0 URINARY TRACT INFECTION WITHOUT HEMATURIA, SITE UNSPECIFIED: ICD-10-CM

## 2021-09-24 DIAGNOSIS — S39.012A LUMBAR STRAIN, INITIAL ENCOUNTER: Primary | ICD-10-CM

## 2021-09-24 LAB
BACTERIA: ABNORMAL /HPF
BILIRUBIN URINE: NEGATIVE
BLOOD, URINE: ABNORMAL
CLARITY: ABNORMAL
COLOR: YELLOW
EPITHELIAL CELLS, UA: ABNORMAL /HPF (ref 0–5)
GLUCOSE URINE: NEGATIVE MG/DL
KETONES, URINE: NEGATIVE MG/DL
LEUKOCYTE ESTERASE, URINE: ABNORMAL
MICROSCOPIC EXAMINATION: YES
NITRITE, URINE: POSITIVE
PH UA: 6 (ref 5–8)
PROTEIN UA: NEGATIVE MG/DL
RBC UA: ABNORMAL /HPF (ref 0–4)
SPECIFIC GRAVITY UA: 1.02 (ref 1–1.03)
URINE TYPE: ABNORMAL
UROBILINOGEN, URINE: 0.2 E.U./DL
WBC UA: ABNORMAL /HPF (ref 0–5)

## 2021-09-24 PROCEDURE — 99285 EMERGENCY DEPT VISIT HI MDM: CPT

## 2021-09-24 PROCEDURE — 96372 THER/PROPH/DIAG INJ SC/IM: CPT

## 2021-09-24 PROCEDURE — 3209999900 CT LUMBAR SPINE TRAUMA RECONSTRUCTION

## 2021-09-24 PROCEDURE — 6370000000 HC RX 637 (ALT 250 FOR IP): Performed by: PHYSICIAN ASSISTANT

## 2021-09-24 PROCEDURE — 87088 URINE BACTERIA CULTURE: CPT

## 2021-09-24 PROCEDURE — 6360000002 HC RX W HCPCS: Performed by: PHYSICIAN ASSISTANT

## 2021-09-24 PROCEDURE — 81001 URINALYSIS AUTO W/SCOPE: CPT

## 2021-09-24 PROCEDURE — 74176 CT ABD & PELVIS W/O CONTRAST: CPT

## 2021-09-24 PROCEDURE — 87186 SC STD MICRODIL/AGAR DIL: CPT

## 2021-09-24 PROCEDURE — 87086 URINE CULTURE/COLONY COUNT: CPT

## 2021-09-24 RX ORDER — KETOROLAC TROMETHAMINE 30 MG/ML
30 INJECTION, SOLUTION INTRAMUSCULAR; INTRAVENOUS ONCE
Status: COMPLETED | OUTPATIENT
Start: 2021-09-24 | End: 2021-09-24

## 2021-09-24 RX ORDER — CEFDINIR 300 MG/1
300 CAPSULE ORAL ONCE
Status: COMPLETED | OUTPATIENT
Start: 2021-09-24 | End: 2021-09-24

## 2021-09-24 RX ORDER — CEFDINIR 300 MG/1
300 CAPSULE ORAL 2 TIMES DAILY
Qty: 14 CAPSULE | Refills: 0 | Status: SHIPPED | OUTPATIENT
Start: 2021-09-24 | End: 2021-10-01

## 2021-09-24 RX ADMIN — CEFDINIR 300 MG: 300 CAPSULE ORAL at 20:16

## 2021-09-24 RX ADMIN — KETOROLAC TROMETHAMINE 30 MG: 30 INJECTION, SOLUTION INTRAMUSCULAR; INTRAVENOUS at 20:16

## 2021-09-24 ASSESSMENT — ENCOUNTER SYMPTOMS
EYES NEGATIVE: 1
ABDOMINAL PAIN: 1
BACK PAIN: 1
SHORTNESS OF BREATH: 0

## 2021-09-24 ASSESSMENT — PAIN SCALES - GENERAL
PAINLEVEL_OUTOF10: 8
PAINLEVEL_OUTOF10: 9
PAINLEVEL_OUTOF10: 8
PAINLEVEL_OUTOF10: 8

## 2021-09-24 NOTE — ED NOTES

## 2021-09-25 NOTE — ED NOTES
Pt scripts x1 instructed to [facility] with [facility]. Assessed per [facility] REED.      Sasha Patiño, JAYSON  67/11/94 [id number]

## 2021-09-25 NOTE — ED PROVIDER NOTES
[facility]  ED  EMERGENCY DEPARTMENT ENCOUNTER        Pt Name: Maikel Patterson  MRN: [id number]  [place] 1965  Date of evaluation: 9/24/2021  Provider: Yvonne Jauregui PA-C  [facility]  ED Attending: Nathalia Lord, DO      This patient was not seen by the attending provider     History provided by the patient    CHIEF COMPLAINT:     Chief Complaint   Patient presents with    Flank Pain     left side felt a pop in lower back on Wednesday pain radiates to abdomin no N/V       HISTORY OF PRESENT ILLNESS:      Maikel Patterson is a 61 y.o. male who arrives to the ED by vehicle. Patient drove himself. Patient is here with pain to the lower back and pain to the left pelvis. Patient states that on Wednesday, 9/22 he was sitting on the toilet and bent over to get his cats some water while he was sitting on the toilet. As he reached, he felt a \"pop\" in his lower back. Pain is mainly to the mid and right lower back. However, now he also reports some pain to his left lower abdomen/pelvis. (He does report a history of known retroperitoneal fibrosis which he admits may be causing some of the pain). Patient states he has a history of chronic pain. He is typically prescribed Percocet. However states his pain medication is currently not given him any more medications pending a urine drug screen result from him. In the meantime the patient has been taking his clonazepam, Tylenol and got some Suboxone from a friend. Pain is nonradiating but localized to his low back and left lower abdomen/pelvis. He denies any numbness or tingling to the extremities. He denies bowel or bladder incontinence. He denies any urinary symptoms that might represent UTI. Patient has exacerbated pain with movement. Slight alleviation of pain with rest.    Nursing Notes were reviewed     REVIEW OF SYSTEMS:     Review of Systems   Constitutional: Negative for appetite change, chills and fever. HENT: Negative. ONE TABLET BY MOUTH DAILY, Disp-90 tablet, R-1Normal      ergocalciferol (DRISDOL) 1.25 MG (21804 UT) capsule Take 1 capsule by mouth twice a week (for 6 months total) for Vitamin D deficiency. , Disp-8 capsule,R-5Normal      fenofibrate (TRIGLIDE) 160 MG tablet Take 1 tablet by mouth daily, Disp-90 tablet, R-1Normal      naloxone 4 MG/0.1ML LIQD nasal spray 1 spray by Nasal route as needed for Opioid Reversal, Disp-1 each, R-5Print      clonazePAM (KLONOPIN) 1 MG tablet Take 1 tablet by mouth 2 times daily as needed for Anxiety for up to 30 days. , Disp-60 tablet, R-0Normal               ALLERGIES:    Citalopram hydrobromide, Cymbalta [duloxetine hcl], Darvocet [propoxyphene n-acetaminophen], Escitalopram oxalate, Lyrica [pregabalin], Morphine, Neurontin [gabapentin], Oxycodone, and Protonix [pantoprazole sodium sesquihydrate]    FAMILY HISTORY:       Family History   Problem Relation Age of Onset    Diabetes Mother     High Blood Pressure Mother     High Cholesterol Mother     Cancer Father         Kidney and thyroid    Cancer Maternal Grandmother     Diabetes Paternal Aunt     Cancer Maternal Grandfather 61        colon          SOCIAL HISTORY:       Social History     Socioeconomic History    Marital status:      Spouse name: None    Number of children: None    Years of education: None    Highest education level: None   Occupational History    None   Tobacco Use    Smoking status: Current Every Day Smoker     Packs/day: 1.00     Years: 30.00     Pack years: 30.00     Last attempt to quit: 2010     Years since quittin.4    Smokeless tobacco: Never Used    Tobacco comment: quit for 5 years about    Substance and Sexual Activity    Alcohol use: No    Drug use: No    Sexual activity: Not Currently   Other Topics Concern    None   Social History Narrative    None     Social Determinants of Health     Financial Resource Strain: Medium Risk    Difficulty of Paying Living paravertebral muscles. No tenderness of the left side. No crepitus to palpate. No notable soft tissue swelling or edema. Patient maintains normal ROM. No acute deformities. SKIN: Warm and dry. No rash. NEUROLOGICAL: Alert and oriented x 3. GCS 15. CN II-XII grossly intact. Strength is 5/5 in all extremities and sensation is intact. Normal gait. Patella DTRs normal and symmetric. PSYCHIATRIC: Normal affect        DIAGNOSTICRESULTS:     LABS:    Results for orders placed or performed during the hospital encounter of 09/24/21   Urinalysis, reflex to microscopic   Result Value Ref Range    Color, UA Yellow Straw/Yellow    Clarity, UA CLOUDY (A) Clear    Glucose, Ur Negative Negative mg/dL    Bilirubin Urine Negative Negative    Ketones, Urine Negative Negative mg/dL    Specific Gravity, UA 1.020 1.005 - 1.030    Blood, Urine TRACE-INTACT (A) Negative    pH, UA 6.0 5.0 - 8.0    Protein, UA Negative Negative mg/dL    Urobilinogen, Urine 0.2 <2.0 E.U./dL    Nitrite, Urine POSITIVE (A) Negative    Leukocyte Esterase, Urine MODERATE (A) Negative    Microscopic Examination YES     Urine Type NotGiven    Microscopic Urinalysis   Result Value Ref Range    WBC, UA  (A) 0 - 5 /HPF    RBC, UA 5-10 (A) 0 - 4 /HPF    Epithelial Cells, UA 2-5 0 - 5 /HPF    Bacteria, UA 4+ (A) None Seen /HPF         RADIOLOGY:  All x-ray studies areviewed/reviewed by me. Formal interpretations per the radiologist are as follows:    CT ABDOMEN PELVIS WO CONTRAST Additional Contrast? None    Result Date: 9/24/2021  EXAMINATION: CT OF THE ABDOMEN AND PELVIS WITHOUT CONTRAST; CT OF THE LUMBAR SPINE WITHOUT CONTRAST 9/24/2021 9:25 pm TECHNIQUE: CT of the abdomen and pelvis was performed without the administration of intravenous contrast. Multiplanar reformatted images are provided for review.  Dose modulation, iterative reconstruction, and/or weight based adjustment of the mA/kV was utilized to reduce the radiation dose to as low as reasonably achievable.; CT of the lumbar spine was performed without the administration of intravenous contrast. Multiplanar reformatted images are provided for review. Adjustment of mA and/or kV according to patient size was utilized. Dose modulation, iterative reconstruction, and/or weight based adjustment of the mA/kV was utilized to reduce the radiation dose to as low as reasonably achievable. COMPARISON: May 2016 HISTORY: ORDERING SYSTEM PROVIDED HISTORY: LLQ pain, low back pain TECHNOLOGIST PROVIDED HISTORY: Reason for exam:->LLQ pain, low back pain Additional Contrast?->None Decision Support Exception - unselect if not a suspected or confirmed emergency medical condition->Emergency Medical Condition (MA) Reason for Exam: lt side flank pain that radiates to back Acuity: Acute Type of Exam: Initial; ORDERING SYSTEM PROVIDED HISTORY: BACK INJURY TECHNOLOGIST PROVIDED HISTORY: Reason for exam:->low back pain Reason for Exam: lt side flank pain that radiates to back Acuity: Acute Type of Exam: Initial FINDINGS: Lower Chest: No focal consolidation is seen at the lung bases. Small hiatal hernia is seen. There is nonspecific thickening at the GE junction. Coronary artery calcification is seen. No pericardial effusion. Organs: Calcified granuloma seen within the spleen Adrenal glands appear normal No intrahepatic ductal dilatation. No perihepatic fluid. No peripancreatic fluid. No peripancreatic inflammatory change No stones or hydronephrosis on the left. Hypodense nodule projects off the left kidney posteriorly, measuring 7- 8 mm in size, compatible with cyst. There is mild perinephric stranding on the right. There is mild renal atrophy on the right. There is mild urothelial thickening on the right. There is medial deviation of the right ureter, secondary to known retroperitoneal fibrosis. .  There is mild Temitope ureteral stranding on the right. No stones on right.  GI/Bowel: No significant small bowel distention noted. Mild stool load is seen in the colon. No bowel obstruction. No pericolonic fat stranding. .  No significant diverticular disease Pelvis: Prominent fat is seen in the inguinal canals. No free fluid in pelvis. No pelvic adenopathy. Caliber of the common femoral arteries and veins appears small in size, suggesting chronic narrowing. Peritoneum/Retroperitoneum: Stent is seen in the IVC. There is increased soft tissue density seen surrounding the IVC compared to prior. .  This measures 1.6 cm in size lateral. Soft tissue density surrounding the aorta appears similar. There are new venous collaterals in the retroperitoneum on the right. Bones/Soft Tissues: Spurring is seen in the spine and hips. Lumbar spine: Vertebral body height and alignment appears normal.  No acute fracture or malalignment noted. Mild facet hypertrophy is seen at L2-L3, L3-L4, L4-L5, and L5-S1. .  Subtle anterior disc space spurring seen at L2-L3. Inocente Salter No significant bony central or foraminal stenosis     Abdomen and pelvis: Mild perinephric and periureteral stranding on the right. Recommend correlation with urinalysis to exclude underlying infection. Increased soft tissue density is seen surrounding the IVC compared to the prior study for comparison. .  This is likely due to progression of the known retroperitoneal fibrosis. Developing adenopathy is considered less likely. Recommend continued clinical-imaging follow-up. Lumbar spine: Mild degenerative change. No significant bony central or foraminal stenosis noted. .  No acute bony abnormality     CT LUMBAR SPINE TRAUMA RECONSTRUCTION    Result Date: 9/24/2021  EXAMINATION: CT OF THE ABDOMEN AND PELVIS WITHOUT CONTRAST; CT OF THE LUMBAR SPINE WITHOUT CONTRAST 9/24/2021 9:25 pm TECHNIQUE: CT of the abdomen and pelvis was performed without the administration of intravenous contrast. Multiplanar reformatted images are provided for review.  Dose modulation, iterative reconstruction, and/or weight based adjustment of the mA/kV was utilized to reduce the radiation dose to as low as reasonably achievable.; CT of the lumbar spine was performed without the administration of intravenous contrast. Multiplanar reformatted images are provided for review. Adjustment of mA and/or kV according to patient size was utilized. Dose modulation, iterative reconstruction, and/or weight based adjustment of the mA/kV was utilized to reduce the radiation dose to as low as reasonably achievable. COMPARISON: May 2016 HISTORY: ORDERING SYSTEM PROVIDED HISTORY: LLQ pain, low back pain TECHNOLOGIST PROVIDED HISTORY: Reason for exam:->LLQ pain, low back pain Additional Contrast?->None Decision Support Exception - unselect if not a suspected or confirmed emergency medical condition->Emergency Medical Condition (MA) Reason for Exam: lt side flank pain that radiates to back Acuity: Acute Type of Exam: Initial; ORDERING SYSTEM PROVIDED HISTORY: BACK INJURY TECHNOLOGIST PROVIDED HISTORY: Reason for exam:->low back pain Reason for Exam: lt side flank pain that radiates to back Acuity: Acute Type of Exam: Initial FINDINGS: Lower Chest: No focal consolidation is seen at the lung bases. Small hiatal hernia is seen. There is nonspecific thickening at the GE junction. Coronary artery calcification is seen. No pericardial effusion. Organs: Calcified granuloma seen within the spleen Adrenal glands appear normal No intrahepatic ductal dilatation. No perihepatic fluid. No peripancreatic fluid. No peripancreatic inflammatory change No stones or hydronephrosis on the left. Hypodense nodule projects off the left kidney posteriorly, measuring 7- 8 mm in size, compatible with cyst. There is mild perinephric stranding on the right. There is mild renal atrophy on the right. There is mild urothelial thickening on the right. There is medial deviation of the right ureter, secondary to known retroperitoneal fibrosis. .  There is mild Temitope ureteral stranding on the right. No stones on right. GI/Bowel: No significant small bowel distention noted. Mild stool load is seen in the colon. No bowel obstruction. No pericolonic fat stranding. .  No significant diverticular disease Pelvis: Prominent fat is seen in the inguinal canals. No free fluid in pelvis. No pelvic adenopathy. Caliber of the common femoral arteries and veins appears small in size, suggesting chronic narrowing. Peritoneum/Retroperitoneum: Stent is seen in the IVC. There is increased soft tissue density seen surrounding the IVC compared to prior. .  This measures 1.6 cm in size lateral. Soft tissue density surrounding the aorta appears similar. There are new venous collaterals in the retroperitoneum on the right. Bones/Soft Tissues: Spurring is seen in the spine and hips. Lumbar spine: Vertebral body height and alignment appears normal.  No acute fracture or malalignment noted. Mild facet hypertrophy is seen at L2-L3, L3-L4, L4-L5, and L5-S1. .  Subtle anterior disc space spurring seen at L2-L3. Skylar Money No significant bony central or foraminal stenosis     Abdomen and pelvis: Mild perinephric and periureteral stranding on the right. Recommend correlation with urinalysis to exclude underlying infection. Increased soft tissue density is seen surrounding the IVC compared to the prior study for comparison. .  This is likely due to progression of the known retroperitoneal fibrosis. Developing adenopathy is considered less likely. Recommend continued clinical-imaging follow-up. Lumbar spine: Mild degenerative change. No significant bony central or foraminal stenosis noted. .  No acute bony abnormality         PROCEDURES:   N/A    CRITICAL CARE TIME:       None    CONSULTS:  None      EMERGENCY DEPARTMENT COURSE and DIFFERENTIAL DIAGNOSIS/MDM:   Vitals:    Vitals:    09/24/21 1836 09/24/21 1842 09/24/21 2058 09/24/21 2219   BP:  (!) 155/107 (!) 153/96 (!) 159/90   Pulse:  90 75 75   Resp:  16 16 16   Temp:  98.4 °F (36.9 °C)     TempSrc:  Oral     SpO2:  96% 97% 96%   Weight: 295 lb (133.8 kg)      Height: 5' 11\" (1.803 m)          Patient was given the following medications:  Medications   ketorolac (TORADOL) injection 30 mg (30 mg IntraMUSCular Given 9/24/21 2016)   cefdinir (OMNICEF) capsule 300 mg (300 mg Oral Given 9/24/21 2016)         I have evaluated this patient in the ED. Old records were reviewed. Patient describes musculoskeletal back pain. He describes acute on chronic pain. While sitting on the commode on Wednesday he reached to get his cat some water when he felt a pop in his back. Patient has muscular tenderness to the right back and slight midline tenderness. He is a morbidly obese man who is somewhat difficult to assess due to body habitus. Due to the pain in his back as well as the left pelvis I did order CT abdomen pelvis without contrast, reconstructed images of the L-spine and a urinalysis. Patient given Toradol 30 mg IM for pain. Opiates not given due to the fact patient drove here. He has also followed with pain management and is currently being investigated with urine drug screen before being given more opiate pain medicine. He took a friend's Suboxone. Urinalysis shows positive nitrates, moderate leukocytes,  WBCs which is 5-10 RBCs and 4+ bacteria. Urine culture sent. Patient given cefdinir 300 mg orally  CT abdomen and pelvis without contrast with recon images of the L-spine shows:  Abdomen and pelvis:   Mild perinephric and periureteral stranding on the right.  Recommend   correlation with urinalysis to exclude underlying infection. Increased soft tissue density is seen surrounding the IVC compared to the   prior study for comparison. .  This is likely due to progression of the known   retroperitoneal fibrosis.  Developing adenopathy is considered less likely. Recommend continued clinical-imaging follow-up.    Lumbar spine:   Mild degenerative change.  No significant bony central or foraminal stenosis   noted.  No acute bony abnormality     On reassessing patient he continues to rest comfortably in the bed though does have more pain when moving around. I reviewed results with him. He does have a muscle relaxer, tizanidine at home. He is Tylenol. He has pain management to get opiates. I do believe his back pain is consistent with muscle strain. However given the infected appearance of his urine there may be a degree of pain from pyelonephritis. Again urine culture has been sent and patient is being placed on a 1 week course of cefdinir. He will be given with primary care and urology follow-up. He should return if symptoms worsen or if he develops any fevers, vomiting. He is stable to go home at this time. Patient was disgruntled that he was not given any opiates at home and felt it was a wasted visit to the ED. Some of his behaviors today have been very concerning for drug-seeking behavior. I estimate there is LOW risk for SEPSIS, ACUTE VERTEBRAL FRACTURE, ABDOMINAL AORTIC ANEURYSM, CAUDA EQUINA SYNDROME, EPIDURAL MASS LESION, SPINAL STENOSIS, OR HERNIATED DISK CAUSING SEVERE STENOSIS, thus I consider the discharge disposition reasonable. Diann Rebecca and I have discussed the diagnosis and risks, and we agree with discharging home to follow-up with their primary doctor. We also discussed returning to the Emergency Department immediately if new or worsening symptoms occur. We have discussed the symptoms which are most concerning (e.g., saddle anesthesia, urinary or bowel incontinence or retention, changing or worsening pain) that necessitate immediate return. FINAL IMPRESSION:      1. Lumbar strain, initial encounter    2.  Urinary tract infection without hematuria, site unspecified          DISPOSITION/PLAN:   DISPOSITION Decision To Discharge      PATIENT REFERRED TO:  Guillaume Washburn, [address] [place], [place] [address] [address]  [phone]    Schedule an appointment as soon as possible for a visit       [facility] [place] [address]  [phone]  Schedule an appointment as soon as possible for a visit         DISCHARGE MEDICATIONS:  Discharge Medication List as of 9/24/2021 11:00 PM      START taking these medications    Details   cefdinir (OMNICEF) 300 MG capsule Take 1 capsule by mouth 2 times daily for 7 days, Disp-14 capsule, R-0Normal                        (Please note thatportions of this note were completed with a voice recognition program.  Efforts were made to edit the dictations, but occasionally words are mis-transcribed.)    Lily Pringle PA-C (electronicallysigned)              Grisel Portillo, [address]  09/24/21 [id number]

## 2021-09-27 LAB
ORGANISM: ABNORMAL
URINE CULTURE, ROUTINE: ABNORMAL

## 2021-09-28 DIAGNOSIS — F41.9 ANXIETY: ICD-10-CM

## 2021-09-28 NOTE — TELEPHONE ENCOUNTER
Refill Request - Controlled Substance    Last Seen Department: 8/19/2021  Last Seen by PCP: 8/19/2021    Last Written: 8/19/2021    Last UDS: 5/8/2020    Med Agreement Signed On: 5/8/2020    Next Appointment: Visit date not found        Requested Prescriptions     Pending Prescriptions Disp Refills    clonazePAM (KLONOPIN) 1 MG tablet [Pharmacy Med Name: clonazePAM 1 MG TABLET] 60 tablet      Sig: TAKE ONE TABLET BY MOUTH TWICE A DAY  AS NEEDED FOR ANXIETY

## 2021-09-29 RX ORDER — CLONAZEPAM 1 MG/1
TABLET ORAL
Qty: 60 TABLET | Refills: 0 | Status: SHIPPED | OUTPATIENT
Start: 2021-09-29 | End: 2021-10-27

## 2021-10-08 ENCOUNTER — OFFICE VISIT (OUTPATIENT)
Dept: FAMILY MEDICINE CLINIC | Age: 56
End: 2021-10-08
Payer: MEDICARE

## 2021-10-08 VITALS
WEIGHT: 297 LBS | HEART RATE: 102 BPM | HEIGHT: 71 IN | TEMPERATURE: 98.1 F | SYSTOLIC BLOOD PRESSURE: 130 MMHG | OXYGEN SATURATION: 96 % | DIASTOLIC BLOOD PRESSURE: 78 MMHG | BODY MASS INDEX: 41.58 KG/M2

## 2021-10-08 DIAGNOSIS — N13.5 IDIOPATHIC RETROPERITONEAL FIBROSIS: ICD-10-CM

## 2021-10-08 DIAGNOSIS — G89.4 CHRONIC PAIN SYNDROME: ICD-10-CM

## 2021-10-08 DIAGNOSIS — G89.29 CHRONIC LEFT-SIDED LOW BACK PAIN, UNSPECIFIED WHETHER SCIATICA PRESENT: ICD-10-CM

## 2021-10-08 DIAGNOSIS — N39.0 URINARY TRACT INFECTION WITHOUT HEMATURIA, SITE UNSPECIFIED: Primary | ICD-10-CM

## 2021-10-08 DIAGNOSIS — M54.50 CHRONIC LEFT-SIDED LOW BACK PAIN, UNSPECIFIED WHETHER SCIATICA PRESENT: ICD-10-CM

## 2021-10-08 DIAGNOSIS — E11.9 TYPE 2 DIABETES MELLITUS WITHOUT COMPLICATION, WITHOUT LONG-TERM CURRENT USE OF INSULIN (HCC): ICD-10-CM

## 2021-10-08 DIAGNOSIS — F41.9 ANXIETY: ICD-10-CM

## 2021-10-08 PROCEDURE — 99214 OFFICE O/P EST MOD 30 MIN: CPT | Performed by: PHYSICIAN ASSISTANT

## 2021-10-08 PROCEDURE — 3051F HG A1C>EQUAL 7.0%<8.0%: CPT | Performed by: PHYSICIAN ASSISTANT

## 2021-10-08 RX ORDER — CEFDINIR 300 MG/1
300 CAPSULE ORAL 2 TIMES DAILY
Qty: 14 CAPSULE | Refills: 0 | Status: SHIPPED | OUTPATIENT
Start: 2021-10-08 | End: 2021-10-15

## 2021-10-08 RX ORDER — OXCARBAZEPINE 150 MG/1
TABLET, FILM COATED ORAL
Qty: 30 TABLET | Refills: 0 | Status: SHIPPED | OUTPATIENT
Start: 2021-10-08 | End: 2021-10-14 | Stop reason: SDUPTHER

## 2021-10-08 NOTE — PROGRESS NOTES
10/14/2021  Cong Alonso (: 1965)  61 y.o.    ASSESSMENT and PLAN:  Cuong Garcia was seen today for ed follow-up. Diagnoses and all orders for this visit:    Urinary tract infection without hematuria, site unspecified  -     cefdinir (OMNICEF) 300 MG capsule; Take 1 capsule by mouth 2 times daily for 7 days  - continued symptoms, will prescribe another round of cefdinir. Will repeat urine studies if sxs do not improve. Chronic pain syndrome  Chronic left-sided low back pain, unspecified whether sciatica present  -   OXcarbazepine (TRILEPTAL) 150 MG tablet; Take 1 tab orally nightly  - continue follow up with pain management. Idiopathic retroperitoneal fibrosis  -     OXcarbazepine (TRILEPTAL) 150 MG tablet; Take 1 tab orally nightly  -     External Referral To Rheumatology  - Progression seen on recent CT- I do recommend he see rheumatology. Referral for  rheumatology placed. Type 2 diabetes mellitus without complication, without long-term current use of insulin (HCC)  -     metFORMIN (GLUCOPHAGE) 500 MG tablet; Start one tablet with dinner for 1 week, then increase to one tablet twice daily with meals.  - Diabetes education provided today:    Diabetes pathoetiology, difference between type 1 and type 2 diabetes, and progressive nature of Type 2 DM. Carbs: good carbs and bad carbs, importance of carb counting, incorporation of protein with each meal to reduce Glycemic index, importance of portions, Carb/insulin ratio. Different diabetic medications. Anxiety  - oarrs reviewed, continue klonopin. Return in about 3 months (around 2022) for Diabetes Mellitus. HPI    Patient presents for ED follow up. Seen in ED on  for acute on chronic back pain   UA concerning for infection and he was d/c on keflex for UTI.    Underwent CT abdomen/pelvis and L spine which showed   Abdomen and pelvis:       Mild perinephric and periureteral stranding on the right.  Recommend   correlation with urinalysis to exclude underlying infection.       Increased soft tissue density is seen surrounding the IVC compared to the   prior study for comparison. .  This is likely due to progression of the known   retroperitoneal fibrosis.  Developing adenopathy is considered less likely. Recommend continued clinical-imaging follow-up.       Lumbar spine:       Mild degenerative change.  No significant bony central or foraminal stenosis   noted. Breanna Baez acute bony abnormality      Retroperitoneal fibrosis: patient has been on chronic pain medication for many years. Reports he was seen initially by rheumatology in early 2000s but was lost to follow up after a poor experience with cymbalta. He was initially on steroids, unclear why they were stopped. Chronic pain: following with pain management. Reports he does not see them again until next year. I informed him of his appointment yesterday, he was unaware that he missed his appointment. He is requesting pain medication today. At minimum a refill on his oxcarbazepine. He continues on klonopin for his anxiety which has not been well controlled lately due his pain in the back and groin. Also discussed his elevated a1c. He has been drinking increased amounts of soda. No current exercise regimen 2/2 to pain. Has not been on diabetes medication in the past.     Review of Systems   Constitutional: Negative for activity change, chills and fever. HENT: Negative for congestion, ear pain, rhinorrhea and sore throat. Eyes: Negative for visual disturbance. Respiratory: Negative for cough and shortness of breath. Cardiovascular: Negative for chest pain and palpitations. Gastrointestinal: Negative for abdominal pain, constipation, diarrhea, nausea and vomiting. Genitourinary: Positive for testicular pain. Negative for difficulty urinating and dysuria. Musculoskeletal: Positive for back pain. Negative for arthralgias and myalgias. Skin: Negative for rash. Neurological: Negative for dizziness, weakness and numbness. Psychiatric/Behavioral: Negative for sleep disturbance. The patient is nervous/anxious. Allergies, past medical history, family history, and social history reviewed and unchanged from previous encounter. Current Outpatient Medications   Medication Sig Dispense Refill    cefdinir (OMNICEF) 300 MG capsule Take 1 capsule by mouth 2 times daily for 7 days 14 capsule 0    metFORMIN (GLUCOPHAGE) 500 MG tablet Start one tablet with dinner for 1 week, then increase to one tablet twice daily with meals. 60 tablet 5    clonazePAM (KLONOPIN) 1 MG tablet TAKE ONE TABLET BY MOUTH TWICE A DAY  AS NEEDED FOR ANXIETY 60 tablet 0    tiZANidine (ZANAFLEX) 4 MG tablet TAKE ONE TABLET BY MOUTH EVERY 4 HOURS AS NEEDED FOR PAIN. MUST LAST 30 DAYS 180 tablet 0    QUEtiapine (SEROQUEL) 400 MG tablet Take 1 tablet by mouth 2 times daily 180 tablet 1    linaclotide (LINZESS) 145 MCG capsule Take 1 capsule by mouth every morning (before breakfast) 30 capsule 5    levothyroxine (SYNTHROID) 88 MCG tablet TAKE ONE TABLET BY MOUTH DAILY 90 tablet 1    ergocalciferol (DRISDOL) 1.25 MG (57300 UT) capsule Take 1 capsule by mouth twice a week (for 6 months total) for Vitamin D deficiency. 8 capsule 5    fenofibrate (TRIGLIDE) 160 MG tablet Take 1 tablet by mouth daily 90 tablet 1    OXcarbazepine (TRILEPTAL) 150 MG tablet Take 1 tab orally nightly 30 tablet 0    lidocaine (LIDODERM) 5 % Place 1 patch onto the skin daily 12 hours on, 12 hours off. 30 patch 0    HYDROcodone-acetaminophen (NORCO) 5-325 MG per tablet Take 1 tablet by mouth 2 times daily as needed for Pain (for severe pain) for up to 10 days. Intended supply: 3 days.  Take lowest dose possible to manage pain 20 tablet 0    DULoxetine (CYMBALTA) 30 MG extended release capsule Take 1 capsule by mouth daily (Patient not taking: Reported on 10/8/2021) 30 capsule 5    naloxone 4 MG/0.1ML LIQD nasal spray 1 spray by Nasal route as needed for Opioid Reversal (Patient not taking: Reported on 10/8/2021) 1 each 5     No current facility-administered medications for this visit. Vitals:    10/08/21 1404   BP: 130/78   Site: Right Upper Arm   Position: Sitting   Cuff Size: Large Adult   Pulse: 102   Temp: 98.1 °F (36.7 °C)   TempSrc: Oral   SpO2: 96%   Weight: 297 lb (134.7 kg)   Height: 5' 11\" (1.803 m)     Estimated body mass index is 41.42 kg/m² as calculated from the following:    Height as of this encounter: 5' 11\" (1.803 m). Weight as of this encounter: 297 lb (134.7 kg). Physical Exam  Constitutional:       General: He is not in acute distress. Appearance: He is well-developed. He is obese. He is ill-appearing. HENT:      Head: Normocephalic and atraumatic. Eyes:      Conjunctiva/sclera: Conjunctivae normal.      Pupils: Pupils are equal, round, and reactive to light. Cardiovascular:      Rate and Rhythm: Normal rate and regular rhythm. Heart sounds: Normal heart sounds. No murmur heard. Pulmonary:      Effort: Pulmonary effort is normal.      Breath sounds: Normal breath sounds. No wheezing. Musculoskeletal:      Cervical back: Neck supple. Thoracic back: Decreased range of motion. Lumbar back: Decreased range of motion. Lymphadenopathy:      Cervical: No cervical adenopathy. Skin:     General: Skin is warm and dry. Findings: No rash. Neurological:      Mental Status: He is alert and oriented to person, place, and time.

## 2021-10-14 ENCOUNTER — OFFICE VISIT (OUTPATIENT)
Dept: PAIN MANAGEMENT | Age: 56
End: 2021-10-14
Payer: MEDICARE

## 2021-10-14 VITALS
TEMPERATURE: 97.1 F | HEART RATE: 84 BPM | DIASTOLIC BLOOD PRESSURE: 72 MMHG | SYSTOLIC BLOOD PRESSURE: 138 MMHG | OXYGEN SATURATION: 97 %

## 2021-10-14 DIAGNOSIS — G89.29 CHRONIC LEFT-SIDED LOW BACK PAIN, UNSPECIFIED WHETHER SCIATICA PRESENT: ICD-10-CM

## 2021-10-14 DIAGNOSIS — N13.5 IDIOPATHIC RETROPERITONEAL FIBROSIS: ICD-10-CM

## 2021-10-14 DIAGNOSIS — M54.50 CHRONIC LEFT-SIDED LOW BACK PAIN, UNSPECIFIED WHETHER SCIATICA PRESENT: ICD-10-CM

## 2021-10-14 DIAGNOSIS — G89.4 CHRONIC PAIN SYNDROME: ICD-10-CM

## 2021-10-14 DIAGNOSIS — E03.9 HYPOTHYROIDISM, UNSPECIFIED TYPE: ICD-10-CM

## 2021-10-14 DIAGNOSIS — M12.9 ARTHROPATHY: ICD-10-CM

## 2021-10-14 DIAGNOSIS — F41.9 ANXIETY: ICD-10-CM

## 2021-10-14 DIAGNOSIS — F32.89 OTHER DEPRESSION: ICD-10-CM

## 2021-10-14 DIAGNOSIS — Z51.81 ENCOUNTER FOR THERAPEUTIC DRUG LEVEL MONITORING: ICD-10-CM

## 2021-10-14 DIAGNOSIS — R10.32 LEFT GROIN PAIN: ICD-10-CM

## 2021-10-14 PROBLEM — E11.9 TYPE 2 DIABETES MELLITUS WITHOUT COMPLICATION, WITHOUT LONG-TERM CURRENT USE OF INSULIN (HCC): Status: ACTIVE | Noted: 2021-10-14

## 2021-10-14 PROCEDURE — 99214 OFFICE O/P EST MOD 30 MIN: CPT | Performed by: NURSE PRACTITIONER

## 2021-10-14 RX ORDER — OXCARBAZEPINE 150 MG/1
TABLET, FILM COATED ORAL
Qty: 30 TABLET | Refills: 0 | Status: SHIPPED | OUTPATIENT
Start: 2021-10-14 | End: 2021-11-22 | Stop reason: SDUPTHER

## 2021-10-14 RX ORDER — LIDOCAINE 50 MG/G
1 PATCH TOPICAL DAILY
Qty: 30 PATCH | Refills: 0 | Status: SHIPPED | OUTPATIENT
Start: 2021-10-14 | End: 2021-11-22 | Stop reason: SDUPTHER

## 2021-10-14 RX ORDER — HYDROCODONE BITARTRATE AND ACETAMINOPHEN 5; 325 MG/1; MG/1
1 TABLET ORAL 2 TIMES DAILY PRN
Qty: 20 TABLET | Refills: 0 | Status: SHIPPED | OUTPATIENT
Start: 2021-10-14 | End: 2021-11-22

## 2021-10-14 ASSESSMENT — ENCOUNTER SYMPTOMS
COUGH: 0
DIARRHEA: 0
SHORTNESS OF BREATH: 0
SORE THROAT: 0
VOMITING: 0
CONSTIPATION: 0
RHINORRHEA: 0
NAUSEA: 0
ABDOMINAL PAIN: 0
BACK PAIN: 1

## 2021-10-14 NOTE — PROGRESS NOTES
[facility]  1965  <[id number]>      HISTORY OF PRESENT ILLNESS:  Mr. Mohit Babin is a 61 y.o. male returns for a follow up visit for pain management  He has a diagnosis of   1. Chronic pain syndrome    2. Arthropathy    3. Chronic left-sided low back pain, unspecified whether sciatica present    4. Idiopathic retroperitoneal fibrosis    5. Left groin pain    6. Hypothyroidism, unspecified type    7. Other depression    8. Anxiety    9. Encounter for therapeutic drug level monitoring      On the Patients Pain Assessment form reviewed with the Medical Assistant:  He complains of pain in the groin and pelvic He rates the pain 9/10 and describes it as stabbing. Current treatment regimen has helped relieve about 20% of the pain. He denies any side effects from the current pain regimen. Patient reports that since the last follow up visit the physical functioning is worse, family/social relationships are worse, mood is worse sleep patterns are worse, and that the overall functioning is worse. Patient denies misusing/abusing his narcotic pain medications or using any illegal drugs. There are No indicators for possible drug abuse, addiction or diversion problems. Upon obtaining medical history from Mr. Pitts states that pain is not manageable on current pain therapy. Reports that his back gave out last month causing him to go to the ER, received Toradol injection that seems to help stabilize the pain. Admits to abdominal discomfort, was treated by his PCP for UTI. Pain seemed to stabilize after a few days. Mood is stable without anxiety. Sleep is fair with an average of 5-6 hours. Denies to having issues of constipation. Tolerating activities/house chores with moderate tenderness to the lower back. ALLERGIES: Patients list of allergies were reviewed     MEDICATIONS: Mr. Mohit Babin list of medications were reviewed. His current medications are   Outpatient Medications Prior to Visit   Medication Sig Dispense Refill  cefdinir (OMNICEF) 300 MG capsule Take 1 capsule by mouth 2 times daily for 7 days 14 capsule 0    metFORMIN (GLUCOPHAGE) 500 MG tablet Start one tablet with dinner for 1 week, then increase to one tablet twice daily with meals. 60 tablet 5    clonazePAM (KLONOPIN) 1 MG tablet TAKE ONE TABLET BY MOUTH TWICE A DAY  AS NEEDED FOR ANXIETY 60 tablet 0    tiZANidine (ZANAFLEX) 4 MG tablet TAKE ONE TABLET BY MOUTH EVERY 4 HOURS AS NEEDED FOR PAIN. MUST LAST 30 DAYS 180 tablet 0    QUEtiapine (SEROQUEL) 400 MG tablet Take 1 tablet by mouth 2 times daily 180 tablet 1    linaclotide (LINZESS) 145 MCG capsule Take 1 capsule by mouth every morning (before breakfast) 30 capsule 5    levothyroxine (SYNTHROID) 88 MCG tablet TAKE ONE TABLET BY MOUTH DAILY 90 tablet 1    ergocalciferol (DRISDOL) 1.25 MG (49607 UT) capsule Take 1 capsule by mouth twice a week (for 6 months total) for Vitamin D deficiency. 8 capsule 5    fenofibrate (TRIGLIDE) 160 MG tablet Take 1 tablet by mouth daily 90 tablet 1    OXcarbazepine (TRILEPTAL) 150 MG tablet Take 1 tab orally nightly 30 tablet 0    DULoxetine (CYMBALTA) 30 MG extended release capsule Take 1 capsule by mouth daily (Patient not taking: Reported on 10/8/2021) 30 capsule 5    naloxone 4 MG/0.1ML LIQD nasal spray 1 spray by Nasal route as needed for Opioid Reversal (Patient not taking: Reported on 10/8/2021) 1 each 5     No facility-administered medications prior to visit. SOCIAL/FAMILY/PAST MEDICAL HISTORY: Mr. Rupal Louis, family and past medical history was reviewed. REVIEW OF SYSTEMS:    Respiratory: Negative for apnea, chest tightness and shortness of breath or change in baseline breathing. Gastrointestinal: Negative for nausea, vomiting, abdominal pain, diarrhea, constipation, blood in stool and abdominal distention. PHYSICAL EXAM:   Nursing note and vitals reviewed.  /72 (Site: Right Upper Arm, Position: Sitting, Cuff Size: Large Adult) Pulse 84   Temp 97.1 °F (36.2 °C) (Infrared)   SpO2 97%   Constitutional: He appears well-developed and well-nourished. No acute distress. Skin: Skin is warm and dry, good turgor. No rash noted. He is not diaphoretic. Cardiovascular: Normal rate, regular rhythm, normal heart sounds, and does not have murmur. Pulmonary/Chest: Effort normal. No respiratory distress. He does not have wheezes in the lung fields. He has no rales. Neurological/Psychiatric:He is alert and oriented to person, place, and time. Coordination is  Normal. Came in by w/c  His mood isAppropriate and affect is Neutral/Euthymic(normal) . CT Scan of the Lumbar spine, Abdomen 9/24/21:  Abdomen and pelvis:       Mild perinephric and periureteral stranding on the right.  Recommend   correlation with urinalysis to exclude underlying infection.       Increased soft tissue density is seen surrounding the IVC compared to the   prior study for comparison. .  This is likely due to progression of the known   retroperitoneal fibrosis.  Developing adenopathy is considered less likely. Recommend continued clinical-imaging follow-up.       Lumbar spine:       Mild degenerative change.  No significant bony central or foraminal stenosis   noted. .  No acute bony abnormality     IMPRESSION:   1. Chronic pain syndrome    2. Arthropathy    3. Chronic left-sided low back pain, unspecified whether sciatica present    4. Idiopathic retroperitoneal fibrosis    5. Left groin pain    6. Hypothyroidism, unspecified type    7. Other depression    8. Anxiety    9.  Encounter for therapeutic drug level monitoring        PLAN:  Informed verbal consent was obtained  -Hydrocodone-acetaminophen 5-325 mg tabs orally bid prn x10 days  -Continue with lidocaine, Trileptal  -CT of the Lumbar spine/abdomen reviewed  -He is yet to complete PT  -Coached patient on failed UDS + Buprenorphine, he did admit to getting this from a friend last month, he will be given 10 days worth of opioids. UDS will be completed today, continuation of opioid therapy is dependent on passing next UDS. He verbalized understanding  -CBT techniques- relaxation therapies such as biofeedback, mindfulness based stress reduction, imagery, cognitive restructuring, problem solving discussed with patient  -He was advised weight reduction, diet changes- 800-1200 matilda diet, diet diary, exercising, nutritional  consult increased physical activity as tolerated  -Advised patient to quit smoking for  health related concerns and to improve the treatment outcomes. Education was given on quitting smoking and the use of different modalities including medications, hypnotherapy, counselling  and biofeedback. These were discussed with patient.  -Last UDS 9/9/21 FAILED  -Return in about 4 weeks (around 11/11/2021). Current Outpatient Medications   Medication Sig Dispense Refill    OXcarbazepine (TRILEPTAL) 150 MG tablet Take 1 tab orally nightly 30 tablet 0    lidocaine (LIDODERM) 5 % Place 1 patch onto the skin daily 12 hours on, 12 hours off. 30 patch 0    HYDROcodone-acetaminophen (NORCO) 5-325 MG per tablet Take 1 tablet by mouth 2 times daily as needed for Pain (for severe pain) for up to 10 days. Intended supply: 3 days. Take lowest dose possible to manage pain 20 tablet 0    cefdinir (OMNICEF) 300 MG capsule Take 1 capsule by mouth 2 times daily for 7 days 14 capsule 0    metFORMIN (GLUCOPHAGE) 500 MG tablet Start one tablet with dinner for 1 week, then increase to one tablet twice daily with meals. 60 tablet 5    clonazePAM (KLONOPIN) 1 MG tablet TAKE ONE TABLET BY MOUTH TWICE A DAY  AS NEEDED FOR ANXIETY 60 tablet 0    tiZANidine (ZANAFLEX) 4 MG tablet TAKE ONE TABLET BY MOUTH EVERY 4 HOURS AS NEEDED FOR PAIN.  MUST LAST 30 DAYS 180 tablet 0    QUEtiapine (SEROQUEL) 400 MG tablet Take 1 tablet by mouth 2 times daily 180 tablet 1    linaclotide (LINZESS) 145 MCG capsule Take 1 capsule by mouth every morning (before breakfast) 30 capsule 5    levothyroxine (SYNTHROID) 88 MCG tablet TAKE ONE TABLET BY MOUTH DAILY 90 tablet 1    ergocalciferol (DRISDOL) 1.25 MG (56942 UT) capsule Take 1 capsule by mouth twice a week (for 6 months total) for Vitamin D deficiency. 8 capsule 5    fenofibrate (TRIGLIDE) 160 MG tablet Take 1 tablet by mouth daily 90 tablet 1    DULoxetine (CYMBALTA) 30 MG extended release capsule Take 1 capsule by mouth daily (Patient not taking: Reported on 10/8/2021) 30 capsule 5    naloxone 4 MG/0.1ML LIQD nasal spray 1 spray by Nasal route as needed for Opioid Reversal (Patient not taking: Reported on 10/8/2021) 1 each 5     No current facility-administered medications for this visit. I will continue his current medication regimen  which is part of the above treatment schedule. It has been helping with Mr. Donis House chronic  medical problems which for this visit include:   Diagnoses of Chronic pain syndrome, Arthropathy, Chronic left-sided low back pain, unspecified whether sciatica present, Idiopathic retroperitoneal fibrosis, Left groin pain, Hypothyroidism, unspecified type, Other depression, Anxiety, and Encounter for therapeutic drug level monitoring were pertinent to this visit. Risks and benefits of the medications and other alternative treatments  including no treatment were discussed with the patient. The common side effects of these medications were also explained to the patient. Informed verbal consent was obtained. Goals of current treatment regimen include improvement in pain, restoration of functioning- with focus on improvement in physical performance, general activity, work or disability,emotional distress, health care utilization and  decreased medication consumption. Will continue to monitor progress towards achieving/maintaining therapeutic goals with special emphasis on  1. Improvement in perceived interfernce  of pain with ADL's. Ability to do home exercises independently. Ability to do household chores indoor and/or outdoor work and social and leisure activities. Improve psychosocial and physical functioning. - he is not showing any significant progress/or showing regression  towards this goal and reassessment and adjustment of goals/treatment have been made. He was advised against drinking alcohol with the narcotic pain medicines, advised against driving or handling machinery while adjusting the dose of medicines or if having cognitive  issues related to the current medications. Risk of overdose and death, if medicines not taken as prescribed, were also discussed. If the patient develops new symptoms or if the symptoms worsen, the patient should call the office. While transcribing every attempt was made to maintain the accuracy of the note in terms of it's contents,there may have been some errors made inadvertently. Thank you for allowing me to participate in the care of this patient.     Alden Zavala CNP    Cc: BRIANNA Rae

## 2021-10-14 NOTE — PATIENT INSTRUCTIONS
Patient Education        Back Stretches: Exercises  Introduction  Here are some examples of exercises for stretching your back. Start each exercise slowly. Ease off the exercise if you start to have pain. Your doctor or physical therapist will tell you when you can start these exercises and which ones will work best for you. How to do the exercises  Overhead stretch    1. Stand comfortably with your feet shoulder-width apart. 2. Looking straight ahead, raise both arms over your head and reach toward the ceiling. Do not allow your head to tilt back. 3. Hold for 15 to 30 seconds, then lower your arms to your sides. 4. Repeat 2 to 4 times. Side stretch    1. Stand comfortably with your feet shoulder-width apart. 2. Raise one arm over your head, and then lean to the other side. 3. Slide your hand down your leg as you let the weight of your arm gently stretch your side muscles. Hold for 15 to 30 seconds. 4. Repeat 2 to 4 times on each side. Press-up    1. Lie on your stomach, supporting your body with your forearms. 2. Press your elbows down into the floor to raise your upper back. As you do this, relax your stomach muscles and allow your back to arch without using your back muscles. As your press up, do not let your hips or pelvis come off the floor. 3. Hold for 15 to 30 seconds, then relax. 4. Repeat 2 to 4 times. Relax and rest    1. Lie on your back with a rolled towel under your neck and a pillow under your knees. Extend your arms comfortably to your sides. 2. Relax and breathe normally. 3. Remain in this position for about 10 minutes. 4. If you can, do this 2 or 3 times each day. Follow-up care is a key part of your treatment and safety. Be sure to make and go to all appointments, and call your doctor if you are having problems. It's also a good idea to know your test results and keep a list of the medicines you take. Where can you learn more? Go to [email or web address] org and sign in to your [employer] account. Enter Q614 in the [employer] box to learn more about \"Back Stretches: Exercises. \"     If you do not have an account, please click on the \"Sign Up Now\" link. Current as of: July 1, 2021               Content Version: 13.0  © 7720-2431 [employer]. Care instructions adapted under license by [facility] ([facility]). If you have questions about a medical condition or this instruction, always ask your healthcare professional. [address] any warranty or liability for your use of this information.

## 2021-11-22 ENCOUNTER — TELEPHONE (OUTPATIENT)
Dept: PAIN MANAGEMENT | Age: 56
End: 2021-11-22

## 2021-11-22 ENCOUNTER — OFFICE VISIT (OUTPATIENT)
Dept: PAIN MANAGEMENT | Age: 56
End: 2021-11-22
Payer: MEDICARE

## 2021-11-22 VITALS
HEIGHT: 71 IN | BODY MASS INDEX: 41.72 KG/M2 | SYSTOLIC BLOOD PRESSURE: 128 MMHG | WEIGHT: 298 LBS | DIASTOLIC BLOOD PRESSURE: 72 MMHG | OXYGEN SATURATION: 98 % | TEMPERATURE: 97.6 F | HEART RATE: 88 BPM

## 2021-11-22 DIAGNOSIS — N13.5 IDIOPATHIC RETROPERITONEAL FIBROSIS: ICD-10-CM

## 2021-11-22 DIAGNOSIS — M54.50 CHRONIC LEFT-SIDED LOW BACK PAIN, UNSPECIFIED WHETHER SCIATICA PRESENT: ICD-10-CM

## 2021-11-22 DIAGNOSIS — E03.9 HYPOTHYROIDISM, UNSPECIFIED TYPE: ICD-10-CM

## 2021-11-22 DIAGNOSIS — F41.9 ANXIETY: ICD-10-CM

## 2021-11-22 DIAGNOSIS — G89.29 CHRONIC LEFT-SIDED LOW BACK PAIN, UNSPECIFIED WHETHER SCIATICA PRESENT: ICD-10-CM

## 2021-11-22 DIAGNOSIS — G89.4 CHRONIC PAIN SYNDROME: ICD-10-CM

## 2021-11-22 DIAGNOSIS — R10.32 LEFT GROIN PAIN: ICD-10-CM

## 2021-11-22 DIAGNOSIS — F32.89 OTHER DEPRESSION: ICD-10-CM

## 2021-11-22 PROCEDURE — 99214 OFFICE O/P EST MOD 30 MIN: CPT | Performed by: NURSE PRACTITIONER

## 2021-11-22 RX ORDER — LIDOCAINE 50 MG/G
1 PATCH TOPICAL DAILY
Qty: 30 PATCH | Refills: 0 | Status: SHIPPED | OUTPATIENT
Start: 2021-11-22 | End: 2021-12-22

## 2021-11-22 RX ORDER — OXCARBAZEPINE 150 MG/1
TABLET, FILM COATED ORAL
Qty: 30 TABLET | Refills: 0 | Status: SHIPPED | OUTPATIENT
Start: 2021-11-22

## 2021-11-22 RX ORDER — BUPRENORPHINE 10 UG/H
1 PATCH TRANSDERMAL WEEKLY
Qty: 4 PATCH | Refills: 0 | Status: SHIPPED | OUTPATIENT
Start: 2021-11-22 | End: 2021-12-22

## 2021-11-22 NOTE — TELEPHONE ENCOUNTER
Submitted PA for BUPRENORPHINE PATCH Via CMM Key: [id number] STATUS: \"Approved, Coverage Starts on: 8/23/2021 12:00:00 AM, Coverage Ends on: 11/22/2022\"    The patient was notified by .

## 2021-11-22 NOTE — PROGRESS NOTES
[facility]  1965  <[id number]>      HISTORY OF PRESENT ILLNESS:  Mr. Dennise Puente is a 61 y.o. male returns for a follow up visit for pain management  He has a diagnosis of   1. Chronic pain syndrome    2. Chronic left-sided low back pain, unspecified whether sciatica present    3. Idiopathic retroperitoneal fibrosis    4. Left groin pain    5. Other depression    6. Anxiety    7. Hypothyroidism, unspecified type      On the Patients Pain Assessment form:  He complains of pain in the left ear and front hip, stomach area. He rates the pain 9/10 and describes it as aching, stabbing. Current treatment regimen has helped relieve about 0% of the pain. He denies any side effects from the current pain regimen. Patient reports that since the last follow up visit the physical functioning is worse, family/social relationships are unchanged, mood is unchanged sleep patterns are worse, and that the overall functioning is worse. Patient denies misusing/abusing his narcotic pain medications or using any illegal drugs. Upon obtaining medical history from Mr. Pitts states that pain is manageable on current pain therapy. Reports being under the care of Rheumatology for the fibrosis. Mood is stable, maintains Clonazepam through his PCP. Sleep is fair with an average of 5-6 hours. Denies to having issues of constipation. Tolerating activities/house chores with moderate tenderness to the lower back. ALLERGIES: Patients list of allergies were reviewed     MEDICATIONS: Mr. Dennise Puente list of medications were reviewed. His current medications are   Outpatient Medications Prior to Visit   Medication Sig Dispense Refill    clonazePAM (KLONOPIN) 1 MG tablet TAKE ONE TABLET BY MOUTH TWICE A DAY AS NEEDED FOR ANXIETY 60 tablet 0    tiZANidine (ZANAFLEX) 4 MG tablet TAKE ONE TABLET BY MOUTH EVERY 4 HOURS AS NEEDED FOR PAIN MUST LAST 30 DAYS 180 tablet 0    metFORMIN (GLUCOPHAGE) 500 MG tablet Start one tablet with dinner for 1 week, then increase to one tablet twice daily with meals. 60 tablet 5    DULoxetine (CYMBALTA) 30 MG extended release capsule Take 1 capsule by mouth daily 30 capsule 5    QUEtiapine (SEROQUEL) 400 MG tablet Take 1 tablet by mouth 2 times daily 180 tablet 1    linaclotide (LINZESS) 145 MCG capsule Take 1 capsule by mouth every morning (before breakfast) 30 capsule 5    levothyroxine (SYNTHROID) 88 MCG tablet TAKE ONE TABLET BY MOUTH DAILY 90 tablet 1    ergocalciferol (DRISDOL) 1.25 MG (57873 UT) capsule Take 1 capsule by mouth twice a week (for 6 months total) for Vitamin D deficiency. 8 capsule 5    fenofibrate (TRIGLIDE) 160 MG tablet Take 1 tablet by mouth daily 90 tablet 1    naloxone 4 MG/0.1ML LIQD nasal spray 1 spray by Nasal route as needed for Opioid Reversal 1 each 5    OXcarbazepine (TRILEPTAL) 150 MG tablet Take 1 tab orally nightly 30 tablet 0     No facility-administered medications prior to visit. SOCIAL/FAMILY/PAST MEDICAL HISTORY: Mr. Chance Mail, family and past medical history was reviewed. REVIEW OF SYSTEMS:    Respiratory: Negative for apnea, chest tightness and shortness of breath or change in baseline breathing. Gastrointestinal: Negative for nausea, vomiting, abdominal pain, diarrhea, constipation, blood in stool and abdominal distention. PHYSICAL EXAM:   Nursing note and vitals reviewed. /72   Pulse 88   Temp 97.6 °F (36.4 °C)   Ht 5' 11\" (1.803 m)   Wt 298 lb (135.2 kg)   SpO2 98%   BMI 41.56 kg/m²   Constitutional: He appears well-developed and well-nourished. No acute distress. Skin: Skin is warm and dry, good turgor. No rash noted. He is not diaphoretic. Cardiovascular: Normal rate, regular rhythm, normal heart sounds, and does not have murmur. Pulmonary/Chest: Effort normal. No respiratory distress. He does not have wheezes in the lung fields. He has no rales. Neurological/Psychiatric:He is alert and oriented to person, place, and time. Coordination is  Normal. Stable gait with the aid of a cane  His mood isAppropriate and affect is Neutral/Euthymic(normal) . IMPRESSION:   1. Chronic pain syndrome    2. Chronic left-sided low back pain, unspecified whether sciatica present    3. Idiopathic retroperitoneal fibrosis    4. Left groin pain    5. Other depression    6. Anxiety    7. Hypothyroidism, unspecified type        PLAN:  Informed verbal consent was obtained  -Butrans 10 mcg film topical weekly  -Patient maintains Clonazepam 1 mg bid through his PCP  -Continue with Lidocaine, Trileptal  -Home exercises recommended  -Informed the patient that He would have to wean down to 0.5 mg tabs of Clonazepam with goal to wean off the Benzos. Patient declined noting he needs the Benzos. Informed the patient he can be prescribed Butrans while on Benzos. Patient reported that the other Rheumatologist admitted that [address] would be the only therapy that works to manage his pain, he was not willing to prescribed them for the patient while under pain management. He requested for a letter noting he is no longer prescribed opioids under my care to allow other provider the opportunity for opioids. -A letter of discharge was provided to the patient with other pain providers. Patient opted to go under another pain provider with hopes for Percocet  -CBT techniques- relaxation therapies such as biofeedback, mindfulness based stress reduction, imagery, cognitive restructuring, problem solving discussed with patient  -He was advised weight reduction, diet changes- 800-1200 matilda diet, diet diary, exercising, nutritional  consult increased physical activity as tolerated  -Last UDS 10/14/21 Consistent  -No follow-ups on file. Current Outpatient Medications   Medication Sig Dispense Refill    OXcarbazepine (TRILEPTAL) 150 MG tablet Take 1 tab orally nightly 30 tablet 0    lidocaine (LIDODERM) 5 % Place 1 patch onto the skin daily 12 hours on, 12 hours off.  30 patch 0    buprenorphine (BUTRANS) 10 MCG/HR PTWK Place 1 patch onto the skin once a week for 30 days. 4 patch 0    clonazePAM (KLONOPIN) 1 MG tablet TAKE ONE TABLET BY MOUTH TWICE A DAY AS NEEDED FOR ANXIETY 60 tablet 0    tiZANidine (ZANAFLEX) 4 MG tablet TAKE ONE TABLET BY MOUTH EVERY 4 HOURS AS NEEDED FOR PAIN MUST LAST 30 DAYS 180 tablet 0    metFORMIN (GLUCOPHAGE) 500 MG tablet Start one tablet with dinner for 1 week, then increase to one tablet twice daily with meals. 60 tablet 5    DULoxetine (CYMBALTA) 30 MG extended release capsule Take 1 capsule by mouth daily 30 capsule 5    QUEtiapine (SEROQUEL) 400 MG tablet Take 1 tablet by mouth 2 times daily 180 tablet 1    linaclotide (LINZESS) 145 MCG capsule Take 1 capsule by mouth every morning (before breakfast) 30 capsule 5    levothyroxine (SYNTHROID) 88 MCG tablet TAKE ONE TABLET BY MOUTH DAILY 90 tablet 1    ergocalciferol (DRISDOL) 1.25 MG (37228 UT) capsule Take 1 capsule by mouth twice a week (for 6 months total) for Vitamin D deficiency. 8 capsule 5    fenofibrate (TRIGLIDE) 160 MG tablet Take 1 tablet by mouth daily 90 tablet 1    naloxone 4 MG/0.1ML LIQD nasal spray 1 spray by Nasal route as needed for Opioid Reversal 1 each 5     No current facility-administered medications for this visit. I will continue his current medication regimen  which is part of the above treatment schedule. It has been helping with Mr. Brenda Beltran chronic  medical problems which for this visit include:   Diagnoses of Chronic pain syndrome, Chronic left-sided low back pain, unspecified whether sciatica present, Idiopathic retroperitoneal fibrosis, Left groin pain, Other depression, Anxiety, and Hypothyroidism, unspecified type were pertinent to this visit. Risks and benefits of the medications and other alternative treatments  including no treatment were discussed with the patient. The common side effects of these medications were also explained to the patient. Informed verbal consent was obtained. Goals of current treatment regimen include improvement in pain, restoration of functioning- with focus on improvement in physical performance, general activity, work or disability,emotional distress, health care utilization and  decreased medication consumption. Will continue to monitor progress towards achieving/maintaining therapeutic goals with special emphasis on  1. Improvement in perceived interfernce  of pain with ADL's. Ability to do home exercises independently. Ability to do household chores indoor and/or outdoor work and social and leisure activities. Improve psychosocial and physical functioning. - he is showing progression towards this treatment goal with the current regimen. He was advised against drinking alcohol with the narcotic pain medicines, advised against driving or handling machinery while adjusting the dose of medicines or if having cognitive  issues related to the current medications. Risk of overdose and death, if medicines not taken as prescribed, were also discussed. If the patient develops new symptoms or if the symptoms worsen, the patient should call the office. While transcribing every attempt was made to maintain the accuracy of the note in terms of it's contents,there may have been some errors made inadvertently. Thank you for allowing me to participate in the care of this patient.     Linsey Gruber, JAMIE    Cc: BRIANNA Beyer

## 2021-11-22 NOTE — PATIENT INSTRUCTIONS
Patient Education        Back Stretches: Exercises  Introduction  Here are some examples of exercises for stretching your back. Start each exercise slowly. Ease off the exercise if you start to have pain. Your doctor or physical therapist will tell you when you can start these exercises and which ones will work best for you. How to do the exercises  Overhead stretch    1. Stand comfortably with your feet shoulder-width apart. 2. Looking straight ahead, raise both arms over your head and reach toward the ceiling. Do not allow your head to tilt back. 3. Hold for 15 to 30 seconds, then lower your arms to your sides. 4. Repeat 2 to 4 times. Side stretch    1. Stand comfortably with your feet shoulder-width apart. 2. Raise one arm over your head, and then lean to the other side. 3. Slide your hand down your leg as you let the weight of your arm gently stretch your side muscles. Hold for 15 to 30 seconds. 4. Repeat 2 to 4 times on each side. Press-up    1. Lie on your stomach, supporting your body with your forearms. 2. Press your elbows down into the floor to raise your upper back. As you do this, relax your stomach muscles and allow your back to arch without using your back muscles. As your press up, do not let your hips or pelvis come off the floor. 3. Hold for 15 to 30 seconds, then relax. 4. Repeat 2 to 4 times. Relax and rest    1. Lie on your back with a rolled towel under your neck and a pillow under your knees. Extend your arms comfortably to your sides. 2. Relax and breathe normally. 3. Remain in this position for about 10 minutes. 4. If you can, do this 2 or 3 times each day. Follow-up care is a key part of your treatment and safety. Be sure to make and go to all appointments, and call your doctor if you are having problems. It's also a good idea to know your test results and keep a list of the medicines you take. Where can you learn more? Go to https://steveneb.[email or web address] org and sign in to your "[employer] account. Enter M522 in the Squirro box to learn more about \"Back Stretches: Exercises. \"     If you do not have an account, please click on the \"Sign Up Now\" link. Current as of: July 1, 2021               Content Version: 13.0  © 9894-5138 [employer], Inc[employer]. Care instructions adapted under license by [facility] [place] ([facility]). If you have questions about a medical condition or this instruction, always ask your healthcare professional. [address] any warranty or liability for your use of this information.

## 2021-11-22 NOTE — LETTER
[facility] Matthew JIMÉNEZ [facility] [address]  Dept: [phone]  Dept Fax: [phone]  Loc: [phone]      11/22/2021    Dear Marybel Veras,    I find it necessary to inform you that I must withdraw my professional commitment to you as a pain management physician due to a breakdown in the doctor/patient relationship. It is essential that you continue to receive medical care for your condition. Therefore, I recommend you make immediate arrangements with another physician to provide the needed care. For emergency medical services, please go to the nearest emergency room for treatment. If you wish, I will continue to treat your urgent medical needs which may develop for the following thirty (30) days from today's date. Enclosed is a form authorizing me to release a copy of your medical records to your new treating physician. I will forward your records promptly upon receipt of this form, signed by you, with completed name and address of the physician to receive your records. If you have any questions regarding the contents of this letter, you may reach me at my office during normal business hours.     Respectfully,        TEREZA TOWNSEND, RAUL - CNP

## 2021-11-26 DIAGNOSIS — F41.9 ANXIETY: ICD-10-CM

## 2021-11-26 DIAGNOSIS — R10.32 LEFT GROIN PAIN: ICD-10-CM

## 2021-11-28 NOTE — TELEPHONE ENCOUNTER
10/14/21 last uds   9/14/21 med contract    Refill Request     Last Seen: Last Seen Department: 10/8/2021  Last Seen by PCP: 10/8/2021    Last Written: 10/29/21 60 with 0     Next Appointment:   Future Appointments   Date Time Provider Ryan Davenport   1/10/2022  1:30 PM BRIANNA Montenegro  [place] - NATHALIE   8/22/2022  2:00 PM BRIANNA Montenegro  Erika - NATHALIE             Requested Prescriptions     Pending Prescriptions Disp Refills    tiZANidine (ZANAFLEX) 4 MG tablet [Pharmacy Med Name: tiZANidine HCL 4MG TABLET] 180 tablet 0     Sig: TAKE ONE TABLET BY MOUTH EVERY 4 HOURS AS NEEDED FOR PAIN (MUST LAST 30 DAYS)    clonazePAM (KLONOPIN) 1 MG tablet [Pharmacy Med Name: clonazePAM 1 MG TABLET] 60 tablet      Sig: TAKE ONE TABLET BY MOUTH TWICE A DAY AS NEEDED FOR ANXIETY

## 2021-11-29 DIAGNOSIS — R10.32 LEFT GROIN PAIN: ICD-10-CM

## 2021-11-29 DIAGNOSIS — F41.9 ANXIETY: ICD-10-CM

## 2021-11-29 RX ORDER — CLONAZEPAM 1 MG/1
TABLET ORAL
Qty: 60 TABLET | Refills: 0 | Status: SHIPPED | OUTPATIENT
Start: 2021-11-29 | End: 2021-12-28 | Stop reason: SDUPTHER

## 2021-11-29 RX ORDER — TIZANIDINE 4 MG/1
TABLET ORAL
Qty: 180 TABLET | Refills: 0 | Status: SHIPPED | OUTPATIENT
Start: 2021-11-29 | End: 2021-12-28 | Stop reason: SDUPTHER

## 2021-11-29 RX ORDER — CLONAZEPAM 1 MG/1
TABLET ORAL
Qty: 60 TABLET | Refills: 0 | OUTPATIENT
Start: 2021-11-29 | End: 2021-12-29

## 2021-11-29 RX ORDER — TIZANIDINE 4 MG/1
TABLET ORAL
Qty: 180 TABLET | Refills: 0 | OUTPATIENT
Start: 2021-11-29

## 2021-11-29 NOTE — TELEPHONE ENCOUNTER
Last office visit 10/8/2021     Last written 10-    Next office visit scheduled 1/10/2022    Requested Prescriptions     Pending Prescriptions Disp Refills    clonazePAM (KLONOPIN) 1 MG tablet 60 tablet 0     Sig: TAKE ONE TABLET BY MOUTH TWICE A DAY AS NEEDED FOR ANXIETY    tiZANidine (ZANAFLEX) 4 MG tablet 180 tablet 0     Sig: TAKE ONE TABLET BY MOUTH EVERY 4 HOURS AS NEEDED FOR PAIN MUST LAST 30 DAYS       Pt. Also requesting AB for burning with urination and urine odor. I attempted to schedule appt.  And patient declined stating this is the same thing I in Sept.

## 2021-12-28 DIAGNOSIS — F41.9 ANXIETY: ICD-10-CM

## 2021-12-28 DIAGNOSIS — R10.32 LEFT GROIN PAIN: ICD-10-CM

## 2021-12-30 RX ORDER — TIZANIDINE 4 MG/1
TABLET ORAL
Qty: 180 TABLET | Refills: 0 | Status: SHIPPED | OUTPATIENT
Start: 2021-12-30 | End: 2022-01-31

## 2021-12-30 RX ORDER — CLONAZEPAM 1 MG/1
TABLET ORAL
Qty: 60 TABLET | Refills: 0 | Status: SHIPPED | OUTPATIENT
Start: 2021-12-30 | End: 2022-01-31

## 2021-12-30 NOTE — TELEPHONE ENCOUNTER
Patient calling stating he is out of medication in pain and freaking out because the practice is closed tomorrow.

## 2022-01-01 ENCOUNTER — TELEPHONE (OUTPATIENT)
Dept: FAMILY MEDICINE CLINIC | Age: 57
End: 2022-01-01

## 2022-01-01 ENCOUNTER — HOSPITAL ENCOUNTER (EMERGENCY)
Age: 57
End: 2022-12-19
Attending: STUDENT IN AN ORGANIZED HEALTH CARE EDUCATION/TRAINING PROGRAM
Payer: MEDICARE

## 2022-01-01 DIAGNOSIS — I46.9 CARDIAC ARREST (HCC): Primary | ICD-10-CM

## 2022-01-01 PROCEDURE — 2500000003 HC RX 250 WO HCPCS: Performed by: STUDENT IN AN ORGANIZED HEALTH CARE EDUCATION/TRAINING PROGRAM

## 2022-01-01 PROCEDURE — 6360000002 HC RX W HCPCS: Performed by: STUDENT IN AN ORGANIZED HEALTH CARE EDUCATION/TRAINING PROGRAM

## 2022-01-01 PROCEDURE — 99284 EMERGENCY DEPT VISIT MOD MDM: CPT

## 2022-01-01 RX ORDER — EPINEPHRINE 0.1 MG/ML
SYRINGE (ML) INJECTION DAILY PRN
Status: COMPLETED | OUTPATIENT
Start: 2022-01-01 | End: 2022-01-01

## 2022-01-01 RX ADMIN — SODIUM BICARBONATE 50 MEQ: 84 INJECTION, SOLUTION INTRAVENOUS at 15:57

## 2022-01-01 RX ADMIN — EPINEPHRINE 1 MG: 0.1 INJECTION, SOLUTION ENDOTRACHEAL; INTRACARDIAC; INTRAVENOUS at 15:53

## 2022-01-01 RX ADMIN — EPINEPHRINE 1 MG: 0.1 INJECTION, SOLUTION ENDOTRACHEAL; INTRACARDIAC; INTRAVENOUS at 15:56

## 2022-01-10 ENCOUNTER — VIRTUAL VISIT (OUTPATIENT)
Dept: FAMILY MEDICINE CLINIC | Age: 57
End: 2022-01-10
Payer: MEDICARE

## 2022-01-10 DIAGNOSIS — E11.9 TYPE 2 DIABETES MELLITUS WITHOUT COMPLICATION, WITHOUT LONG-TERM CURRENT USE OF INSULIN (HCC): Primary | ICD-10-CM

## 2022-01-10 DIAGNOSIS — R30.0 DYSURIA: ICD-10-CM

## 2022-01-10 PROCEDURE — 98967 PH1 ASSMT&MGMT NQHP 11-20: CPT | Performed by: PHYSICIAN ASSISTANT

## 2022-01-10 PROCEDURE — 83036 HEMOGLOBIN GLYCOSYLATED A1C: CPT | Performed by: PHYSICIAN ASSISTANT

## 2022-01-10 SDOH — ECONOMIC STABILITY: HOUSING INSECURITY
IN THE LAST 12 MONTHS, WAS THERE A TIME WHEN YOU DID NOT HAVE A STEADY PLACE TO SLEEP OR SLEPT IN A SHELTER (INCLUDING NOW)?: NO

## 2022-01-10 SDOH — ECONOMIC STABILITY: FOOD INSECURITY: WITHIN THE PAST 12 MONTHS, THE FOOD YOU BOUGHT JUST DIDN'T LAST AND YOU DIDN'T HAVE MONEY TO GET MORE.: SOMETIMES TRUE

## 2022-01-10 SDOH — ECONOMIC STABILITY: FOOD INSECURITY: WITHIN THE PAST 12 MONTHS, YOU WORRIED THAT YOUR FOOD WOULD RUN OUT BEFORE YOU GOT MONEY TO BUY MORE.: SOMETIMES TRUE

## 2022-01-10 SDOH — ECONOMIC STABILITY: TRANSPORTATION INSECURITY
IN THE PAST 12 MONTHS, HAS LACK OF TRANSPORTATION KEPT YOU FROM MEETINGS, WORK, OR FROM GETTING THINGS NEEDED FOR DAILY LIVING?: YES

## 2022-01-10 SDOH — ECONOMIC STABILITY: INCOME INSECURITY: IN THE LAST 12 MONTHS, WAS THERE A TIME WHEN YOU WERE NOT ABLE TO PAY THE MORTGAGE OR RENT ON TIME?: NO

## 2022-01-10 SDOH — ECONOMIC STABILITY: HOUSING INSECURITY: IN THE LAST 12 MONTHS, HOW MANY PLACES HAVE YOU LIVED?: 1

## 2022-01-10 SDOH — ECONOMIC STABILITY: TRANSPORTATION INSECURITY
IN THE PAST 12 MONTHS, HAS THE LACK OF TRANSPORTATION KEPT YOU FROM MEDICAL APPOINTMENTS OR FROM GETTING MEDICATIONS?: YES

## 2022-01-10 ASSESSMENT — SOCIAL DETERMINANTS OF HEALTH (SDOH): HOW HARD IS IT FOR YOU TO PAY FOR THE VERY BASICS LIKE FOOD, HOUSING, MEDICAL CARE, AND HEATING?: VERY HARD

## 2022-01-10 NOTE — PROGRESS NOTES
Torrie Morse is a 61 y.o. male evaluated via telephone on 1/10/2022. Consent:  He and/or health care decision maker is aware that that he may receive a bill for this telephone service, depending on his insurance coverage, and has provided verbal consent to proceed: Yes      Documentation:  I communicated with the patient and/or health care decision maker about anxiety, chronic pain . Details of this discussion including any medical advice provided:     Retroperitoneal fibrosis: patient has been on chronic pain medication for many years. Reports he was seen initially by rheumatology in early 2000s but was lost to follow up after a poor experience with cymbalta. He was initially on steroids, unclear why they were stopped. Has since followed up with UC who is recommended repeat biopsy, pt is due for appt. Encouraged to schedule.      Chronic pain: following with pain management. Continued on pm and flexeril. He continues on klonopin for his anxiety which has not been well controlled lately due his pain in the back and groin. Started on metformin after last labs- a1c 7.1. Has been taking without side effects. Does not monitor BG, due for foot and eye exam- will complete at next OV. Recently seen in ED for UTI. Reports sxs improved after abx but have since returned. Now with foul smell and burning upon completion. Denies fevers ,chills. Has chronic abdominal pain. I affirm this is a Patient Initiated Episode with a Patient who has not had a related appointment within my department in the past 7 days or scheduled within the next 24 hours. Patient identification was verified at the start of the visit: Yes    Total Time: minutes: 11-20 minutes    The visit was conducted pursuant to the emergency declaration under the [facility] [place], [address] [place] [address] authority and the [email or web address] General Act.   Patient identification was verified, and a caregiver was present when appropriate. The patient was located in a state where the provider was credentialed to provide care.     Note: not billable if this call serves to triage the patient into an appointment for the relevant concern      BRIANNA Muñoz

## 2022-01-31 DIAGNOSIS — R10.32 LEFT GROIN PAIN: ICD-10-CM

## 2022-01-31 DIAGNOSIS — F41.9 ANXIETY: ICD-10-CM

## 2022-01-31 RX ORDER — CLONAZEPAM 1 MG/1
TABLET ORAL
Qty: 60 TABLET | Refills: 0 | Status: SHIPPED | OUTPATIENT
Start: 2022-01-31 | End: 2022-02-01 | Stop reason: SDUPTHER

## 2022-01-31 RX ORDER — TIZANIDINE 4 MG/1
TABLET ORAL
Qty: 180 TABLET | Refills: 0 | Status: SHIPPED | OUTPATIENT
Start: 2022-01-31 | End: 2022-03-02

## 2022-01-31 NOTE — TELEPHONE ENCOUNTER
Refill Request - Controlled Substance    Last Seen Department: 1/10/2022  Last Seen by PCP: 1/10/2022    Last Written: 12/30/2021    Last UDS: 5/8/2020    Med Agreement Signed On: 5/8/2020    Next Appointment: 4/11/2022    Future appointment scheduled    Requested Prescriptions     Pending Prescriptions Disp Refills    clonazePAM (KLONOPIN) 1 MG tablet [Pharmacy Med Name: clonazePAM 1 MG TABLET] 60 tablet      Sig: TAKE ONE TABLET BY MOUTH TWICE A DAY AS NEEDED FOR ANXIETY    tiZANidine (Teola Fee) 4 MG tablet [Pharmacy Med Name: tiZANidine HCL 4MG TABLET] 180 tablet 0     Sig: TAKE ONE TABLET BY MOUTH EVERY 4 HOURS AS NEEDED FOR PAIN **MUST LAST 30 DAYS**

## 2022-02-01 DIAGNOSIS — F41.9 ANXIETY: ICD-10-CM

## 2022-02-02 RX ORDER — CLONAZEPAM 1 MG/1
TABLET ORAL
Qty: 60 TABLET | Refills: 0 | Status: SHIPPED | OUTPATIENT
Start: 2022-02-02 | End: 2022-03-02

## 2022-02-02 NOTE — TELEPHONE ENCOUNTER
Harpreet Orta, Can you please do this for [facility]. Pt. Is needing RX before storm arrives.

## 2022-02-17 DIAGNOSIS — F33.41 DEPRESSION, MAJOR, RECURRENT, IN PARTIAL REMISSION (HCC): ICD-10-CM

## 2022-02-17 DIAGNOSIS — F41.9 ANXIETY: ICD-10-CM

## 2022-02-17 RX ORDER — QUETIAPINE FUMARATE 400 MG/1
TABLET, FILM COATED ORAL
Qty: 180 TABLET | Refills: 1 | Status: SHIPPED | OUTPATIENT
Start: 2022-02-17 | End: 2022-07-28

## 2022-02-17 NOTE — TELEPHONE ENCOUNTER
Refill Request     Last Seen: Last Seen Department: 1/10/2022  Last Seen by PCP: 1/10/2022    Last Written: 8/19/21 180 tablet 1 refill     Next Appointment: 4/11/2     Future Appointments   Date Time Provider Ryan Davenport   4/11/2022  1:30 PM BRIANNA Vizcaino   8/22/2022  2:00 PM BRIANNA Vizcaino       Future appointment scheduled      Requested Prescriptions     Pending Prescriptions Disp Refills    QUEtiapine (SEROQUEL) 400 MG tablet [Pharmacy Med Name: QUEtiapine FUMARATE 400 MG TAB] 180 tablet 1     Sig: TAKE ONE TABLET BY MOUTH TWICE A DAY

## 2022-03-01 DIAGNOSIS — F41.9 ANXIETY: ICD-10-CM

## 2022-03-01 DIAGNOSIS — R10.32 LEFT GROIN PAIN: ICD-10-CM

## 2022-03-01 NOTE — TELEPHONE ENCOUNTER
Refill Request     Last Seen: Last Seen Department: 1/10/2022  Last Seen by PCP: 1/10/2022    Last Written: 1/31/22 180 tablet 0 refill     Next Appointment: 4/11/22     Future Appointments   Date Time Provider Ryan Davenport   4/11/2022  1:30 PM BRIANNA Jacobo   8/22/2022  2:00 PM BRIANNA Jacobo  [place] ZELAYA       Future appointment scheduled      Requested Prescriptions     Pending Prescriptions Disp Refills    tiZANidine (Donnal Sacks) 4 MG tablet [Pharmacy Med Name: tiZANidine HCL 4MG TABLET] 180 tablet 0     Sig: TAKE ONE TABLET BY MOUTH EVERY 4 HOURS AS NEEDED FOR PAIN MUST LAST 30 DAYS

## 2022-03-01 NOTE — TELEPHONE ENCOUNTER
Refill Request - Controlled Substance    Last Seen Department: 1/10/2022  Last Seen by PCP: 1/10/22    Last Written: 2/2/22 60 tablet 0 refill     Last UDS: 5/8/20     Med Agreement Signed On: 5/8/20     Next Appointment: 3/1/2022      Future appointment scheduled    Requested Prescriptions     Pending Prescriptions Disp Refills    clonazePAM (KLONOPIN) 1 MG tablet [Pharmacy Med Name: clonazePAM 1 MG TABLET] 60 tablet      Sig: TAKE ONE TABLET BY MOUTH TWICE A DAY AS NEEDED FOR ANXIETY

## 2022-03-02 RX ORDER — TIZANIDINE 4 MG/1
TABLET ORAL
Qty: 180 TABLET | Refills: 0 | Status: SHIPPED | OUTPATIENT
Start: 2022-03-02 | End: 2022-03-29

## 2022-03-02 RX ORDER — CLONAZEPAM 1 MG/1
1 TABLET ORAL 2 TIMES DAILY PRN
Qty: 60 TABLET | Refills: 0 | Status: SHIPPED | OUTPATIENT
Start: 2022-03-02 | End: 2022-04-04 | Stop reason: SDUPTHER

## 2022-03-29 DIAGNOSIS — R10.32 LEFT GROIN PAIN: ICD-10-CM

## 2022-03-29 RX ORDER — TIZANIDINE 4 MG/1
TABLET ORAL
Qty: 180 TABLET | Refills: 0 | Status: SHIPPED | OUTPATIENT
Start: 2022-03-29 | End: 2022-04-29

## 2022-03-29 NOTE — TELEPHONE ENCOUNTER
Refill Request     Last Seen: Last Seen Department: 1/10/2022  Last Seen by PCP: 1/10/2022    Last Written: 3/2/2022    Next Appointment:   Future Appointments   Date Time Provider Ryan Davenport   4/11/2022  1:30 PM BRIANNA Moreno   8/22/2022  2:00 PM BRIANNA Moreno  [place] - NATHALIE       Future appointment scheduled      Requested Prescriptions     Pending Prescriptions Disp Refills    tiZANidine (Phylliss Janna) 4 MG tablet [Pharmacy Med Name: tiZANidine HCL 4MG TABLET] 180 tablet 0     Sig: TAKE ONE TABLET BY MOUTH EVERY 4 HOURS AS NEEDED FOR PAIN MUST LAST 30 DAYS

## 2022-03-31 DIAGNOSIS — F41.9 ANXIETY: ICD-10-CM

## 2022-03-31 NOTE — TELEPHONE ENCOUNTER
Refill Request - Controlled Substance    Last Seen Department: 1/10/2022  Last Seen by PCP: 1/10/2022    Last Written: 3/2/2022    Last UDS: 5/8/20    Med Agreement Signed On: 5/8/20    Next Appointment: 4/11/2022    Future appointment scheduled    Requested Prescriptions     Pending Prescriptions Disp Refills    clonazePAM (KLONOPIN) 1 MG tablet 60 tablet 0     Sig: Take 1 tablet by mouth 2 times daily as needed for Anxiety for up to 30 days.

## 2022-04-01 NOTE — TELEPHONE ENCOUNTER
Pt calling in regards to medication. Is concerned he will not get it before the weekend. Please contact pt at [phone].

## 2022-04-04 RX ORDER — CLONAZEPAM 1 MG/1
1 TABLET ORAL 2 TIMES DAILY PRN
Qty: 60 TABLET | Refills: 0 | Status: SHIPPED | OUTPATIENT
Start: 2022-04-04 | End: 2022-05-02

## 2022-04-11 ENCOUNTER — OFFICE VISIT (OUTPATIENT)
Dept: FAMILY MEDICINE CLINIC | Age: 57
End: 2022-04-11
Payer: MEDICARE

## 2022-04-11 VITALS
OXYGEN SATURATION: 95 % | SYSTOLIC BLOOD PRESSURE: 120 MMHG | TEMPERATURE: 97.7 F | WEIGHT: 279 LBS | BODY MASS INDEX: 39.06 KG/M2 | HEIGHT: 71 IN | HEART RATE: 85 BPM | DIASTOLIC BLOOD PRESSURE: 74 MMHG

## 2022-04-11 DIAGNOSIS — E11.9 TYPE 2 DIABETES, DIET CONTROLLED (HCC): ICD-10-CM

## 2022-04-11 DIAGNOSIS — R30.0 DYSURIA: ICD-10-CM

## 2022-04-11 DIAGNOSIS — G89.4 CHRONIC PAIN SYNDROME: ICD-10-CM

## 2022-04-11 DIAGNOSIS — N13.5 IDIOPATHIC RETROPERITONEAL FIBROSIS: ICD-10-CM

## 2022-04-11 DIAGNOSIS — E78.2 MIXED HYPERLIPIDEMIA: Primary | ICD-10-CM

## 2022-04-11 DIAGNOSIS — E78.2 MIXED HYPERLIPIDEMIA: ICD-10-CM

## 2022-04-11 LAB
BACTERIA: ABNORMAL /HPF
BILIRUBIN, POC: NEGATIVE
BLOOD URINE, POC: ABNORMAL
CLARITY, POC: ABNORMAL
COLOR, POC: ABNORMAL
CREATININE URINE POCT: ABNORMAL
CRYSTALS, UA: ABNORMAL /HPF
EPITHELIAL CELLS, UA: 1 /HPF (ref 0–5)
GLUCOSE URINE, POC: NEGATIVE
HBA1C MFR BLD: 6.4 %
HYALINE CASTS: 1 /LPF (ref 0–8)
KETONES, POC: NEGATIVE
LEUKOCYTE EST, POC: POSITIVE
MICROALBUMIN/CREAT 24H UR: ABNORMAL MG/G{CREAT}
MICROALBUMIN/CREAT UR-RTO: ABNORMAL
NITRITE, POC: ABNORMAL
PH, POC: 5.5
PROTEIN, POC: ABNORMAL
RBC UA: ABNORMAL /HPF (ref 0–4)
SPECIFIC GRAVITY, POC: 1.02
URINE TYPE: ABNORMAL
UROBILINOGEN, POC: ABNORMAL
WBC UA: 231 /HPF (ref 0–5)

## 2022-04-11 PROCEDURE — 82044 UR ALBUMIN SEMIQUANTITATIVE: CPT | Performed by: PHYSICIAN ASSISTANT

## 2022-04-11 PROCEDURE — 99214 OFFICE O/P EST MOD 30 MIN: CPT | Performed by: PHYSICIAN ASSISTANT

## 2022-04-11 PROCEDURE — 81002 URINALYSIS NONAUTO W/O SCOPE: CPT | Performed by: PHYSICIAN ASSISTANT

## 2022-04-11 PROCEDURE — 83036 HEMOGLOBIN GLYCOSYLATED A1C: CPT | Performed by: PHYSICIAN ASSISTANT

## 2022-04-11 PROCEDURE — 3044F HG A1C LEVEL LT 7.0%: CPT | Performed by: PHYSICIAN ASSISTANT

## 2022-04-11 RX ORDER — SULFAMETHOXAZOLE AND TRIMETHOPRIM 800; 160 MG/1; MG/1
1 TABLET ORAL 2 TIMES DAILY
Qty: 10 TABLET | Refills: 0 | Status: SHIPPED | OUTPATIENT
Start: 2022-04-11 | End: 2022-04-16

## 2022-04-11 NOTE — PROGRESS NOTES
2022  Scarlet Mckeon (: 1965)  61 y.o.    ASSESSMENT and PLAN:  Ozzie Wade was seen today for diabetes and follow-up. Diagnoses and all orders for this visit:    Mixed hyperlipidemia  -     Cancel: Comprehensive Metabolic Panel; Future  -     LIPID PANEL; Future  -     Comprehensive Metabolic Panel; Future    Idiopathic retroperitoneal fibrosis  - notes from UC reviewed, patient is due for follow up, reports he is scheduled for next month. Chronic pain syndrome  -     External referral to pain clinic- Dr. Julieta Rodriguez   - requesting to see new provider, new referral placed today. Type 2 diabetes, diet controlled (HCC)  -     POCT glycosylated hemoglobin (Hb A1C) 6.4  -     POCT microalbumin  -     Diabetic Foot Exam  -     CBC; Future  - diet controlled, borderline. Patient declines metformin at this time. Dysuria  -     POCT Urinalysis no Micro  - will send for culture. Return in about 3 months (around 2022) for Controlled Med Management. HPI  Routine follow up chronic conditions. DM: Currently on metformin 500 mg BID. Has been taking without side effects. Does not monitor BG. Retroperitoneal fibrosis: patient has been on chronic pain medication for many years. Reports he was seen initially by rheumatology in early  but was lost to follow up after a poor experience with cymbalta. He was initially on steroids, unclear why they were stopped. Has since followed up with UC who is recommended repeat biopsy, pt is due for appt. Encouraged to schedule.      Chronic pain: following with pain management. Continued on opioids and flexeril.      He continues on klonopin for his anxiety which has not been well controlled. Has been on cymbalta in the past- listed as an allergy but patient does not think this is the case. Also with dysuria and frequency  H/o recurrent uti  No recent abx. Denies fevers, chills, fever.      Review of Systems   Constitutional: Negative for activity change, chills and fever. HENT: Negative for congestion, ear pain, rhinorrhea and sore throat. Eyes: Negative for visual disturbance. Respiratory: Negative for cough and shortness of breath. Cardiovascular: Negative for chest pain and palpitations. Gastrointestinal: Positive for abdominal pain. Negative for constipation, diarrhea, nausea and vomiting. Genitourinary: Positive for dysuria. Negative for difficulty urinating. Musculoskeletal: Negative for arthralgias and myalgias. Skin: Negative for rash. Neurological: Negative for dizziness, weakness and numbness. Psychiatric/Behavioral: Positive for dysphoric mood and sleep disturbance. The patient is nervous/anxious. Allergies, past medical history, family history, and social history reviewed and unchanged from previous encounter. Current Outpatient Medications   Medication Sig Dispense Refill    clonazePAM (KLONOPIN) 1 MG tablet Take 1 tablet by mouth 2 times daily as needed for Anxiety for up to 30 days. 60 tablet 0    tiZANidine (ZANAFLEX) 4 MG tablet TAKE ONE TABLET BY MOUTH EVERY 4 HOURS AS NEEDED FOR PAIN MUST LAST 30 DAYS 180 tablet 0    QUEtiapine (SEROQUEL) 400 MG tablet TAKE ONE TABLET BY MOUTH TWICE A  tablet 1    OXcarbazepine (TRILEPTAL) 150 MG tablet Take 1 tab orally nightly 30 tablet 0    DULoxetine (CYMBALTA) 30 MG extended release capsule Take 1 capsule by mouth daily 30 capsule 5    linaclotide (LINZESS) 145 MCG capsule Take 1 capsule by mouth every morning (before breakfast) 30 capsule 5    levothyroxine (SYNTHROID) 88 MCG tablet TAKE ONE TABLET BY MOUTH DAILY 90 tablet 1    ergocalciferol (DRISDOL) 1.25 MG (21301 UT) capsule Take 1 capsule by mouth twice a week (for 6 months total) for Vitamin D deficiency.  8 capsule 5    fenofibrate (TRIGLIDE) 160 MG tablet Take 1 tablet by mouth daily 90 tablet 1    naloxone 4 MG/0.1ML LIQD nasal spray 1 spray by Nasal route as needed for Opioid Reversal 1 each 5    atorvastatin (LIPITOR) 20 MG tablet Take 1 tablet by mouth daily 90 tablet 1     No current facility-administered medications for this visit. Vitals:    04/11/22 1318   BP: 120/74   Site: Left Upper Arm   Position: Sitting   Cuff Size: Medium Adult   Pulse: 85   Temp: 97.7 °F (36.5 °C)   TempSrc: Oral   SpO2: 95%   Weight: 279 lb (126.6 kg)   Height: 5' 11\" (1.803 m)     Estimated body mass index is 38.91 kg/m² as calculated from the following:    Height as of this encounter: 5' 11\" (1.803 m). Weight as of this encounter: 279 lb (126.6 kg). Physical Exam  Constitutional:       General: He is not in acute distress. Appearance: He is well-developed. HENT:      Head: Normocephalic and atraumatic. Eyes:      Conjunctiva/sclera: Conjunctivae normal.      Pupils: Pupils are equal, round, and reactive to light. Cardiovascular:      Rate and Rhythm: Normal rate and regular rhythm. Heart sounds: Normal heart sounds. No murmur heard. Pulmonary:      Effort: Pulmonary effort is normal.      Breath sounds: Normal breath sounds. No wheezing. Musculoskeletal:      Cervical back: Neck supple. Lymphadenopathy:      Cervical: No cervical adenopathy. Skin:     General: Skin is warm and dry. Findings: No rash.

## 2022-04-12 LAB
A/G RATIO: 1.2 (ref 1.1–2.2)
ALBUMIN SERPL-MCNC: 4.1 G/DL (ref 3.4–5)
ALP BLD-CCNC: 134 U/L (ref 40–129)
ALT SERPL-CCNC: 9 U/L (ref 10–40)
ANION GAP SERPL CALCULATED.3IONS-SCNC: 19 MMOL/L (ref 3–16)
AST SERPL-CCNC: 10 U/L (ref 15–37)
BILIRUB SERPL-MCNC: <0.2 MG/DL (ref 0–1)
BUN BLDV-MCNC: 15 MG/DL (ref 7–20)
CALCIUM SERPL-MCNC: 9.3 MG/DL (ref 8.3–10.6)
CHLORIDE BLD-SCNC: 99 MMOL/L (ref 99–110)
CHOLESTEROL, TOTAL: 168 MG/DL (ref 0–199)
CO2: 21 MMOL/L (ref 21–32)
CREAT SERPL-MCNC: 1.6 MG/DL (ref 0.9–1.3)
GFR AFRICAN AMERICAN: 54
GFR NON-AFRICAN AMERICAN: 45
GLUCOSE BLD-MCNC: 113 MG/DL (ref 70–99)
HCT VFR BLD CALC: 41.6 % (ref 40.5–52.5)
HDLC SERPL-MCNC: 38 MG/DL (ref 40–60)
HEMOGLOBIN: 14 G/DL (ref 13.5–17.5)
LDL CHOLESTEROL CALCULATED: ABNORMAL MG/DL
LDL CHOLESTEROL DIRECT: 81 MG/DL
MCH RBC QN AUTO: 28.3 PG (ref 26–34)
MCHC RBC AUTO-ENTMCNC: 33.6 G/DL (ref 31–36)
MCV RBC AUTO: 84.3 FL (ref 80–100)
PDW BLD-RTO: 18.6 % (ref 12.4–15.4)
PLATELET # BLD: 274 K/UL (ref 135–450)
PMV BLD AUTO: 7.9 FL (ref 5–10.5)
POTASSIUM SERPL-SCNC: 4.3 MMOL/L (ref 3.5–5.1)
RBC # BLD: 4.94 M/UL (ref 4.2–5.9)
SODIUM BLD-SCNC: 139 MMOL/L (ref 136–145)
TOTAL PROTEIN: 7.5 G/DL (ref 6.4–8.2)
TRIGL SERPL-MCNC: 310 MG/DL (ref 0–150)
VLDLC SERPL CALC-MCNC: ABNORMAL MG/DL
WBC # BLD: 9.2 K/UL (ref 4–11)

## 2022-04-13 LAB
ORGANISM: ABNORMAL
URINE CULTURE, ROUTINE: ABNORMAL

## 2022-04-18 DIAGNOSIS — E78.2 MIXED HYPERLIPIDEMIA: ICD-10-CM

## 2022-04-18 DIAGNOSIS — N39.0 RECURRENT UTI: Primary | ICD-10-CM

## 2022-04-18 RX ORDER — CEPHALEXIN 500 MG/1
500 CAPSULE ORAL 4 TIMES DAILY
Qty: 28 CAPSULE | Refills: 0 | Status: SHIPPED | OUTPATIENT
Start: 2022-04-18 | End: 2022-04-25

## 2022-04-18 RX ORDER — ATORVASTATIN CALCIUM 20 MG/1
20 TABLET, FILM COATED ORAL DAILY
Qty: 90 TABLET | Refills: 1 | Status: SHIPPED | OUTPATIENT
Start: 2022-04-18

## 2022-04-26 ASSESSMENT — ENCOUNTER SYMPTOMS
RHINORRHEA: 0
CONSTIPATION: 0
COUGH: 0
DIARRHEA: 0
ABDOMINAL PAIN: 1
SORE THROAT: 0
VOMITING: 0
NAUSEA: 0
SHORTNESS OF BREATH: 0

## 2022-04-29 DIAGNOSIS — R10.32 LEFT GROIN PAIN: ICD-10-CM

## 2022-04-29 RX ORDER — TIZANIDINE 4 MG/1
TABLET ORAL
Qty: 180 TABLET | Refills: 1 | Status: SHIPPED | OUTPATIENT
Start: 2022-04-29 | End: 2022-06-28

## 2022-04-29 NOTE — TELEPHONE ENCOUNTER
Refill Request     Last Seen: Last Seen Department: 4/11/2022  Last Seen by PCP: 4/11/2022    Last Written: 3/29/22 180 tablet   0 refill      Next Appointment: 7/11/22     Future Appointments   Date Time Provider Ryan Davenport   7/11/2022  2:00 [facility] [place] [facility] [place]   8[facility]22/2022  [facility]       Future appointment scheduled      Requested Prescriptions     Pending Prescriptions Disp Refills    tiZANidine (Amin Half) 4 MG tablet [Pharmacy Med Name: tiZANidine HCL 4MG TABLET] 180 tablet 1     Sig: TAKE ONE TABLET BY MOUTH EVERY 4 HOURS AS NEEDED FOR PAIN MUST LAST 30 DAYS

## 2022-05-02 DIAGNOSIS — F41.9 ANXIETY: ICD-10-CM

## 2022-05-02 RX ORDER — CLONAZEPAM 1 MG/1
TABLET ORAL
Qty: 60 TABLET | Refills: 0 | Status: SHIPPED | OUTPATIENT
Start: 2022-05-02 | End: 2022-06-03 | Stop reason: SDUPTHER

## 2022-05-02 NOTE — TELEPHONE ENCOUNTER
.  Refill Request - Controlled Substance    Last Seen Department: 4/11/2022  Last Seen by PCP: 4/11/2022    Last Written: 4/4/22 60 with 0     Last UDS: 10/14/21    Med Agreement Signed On: 9/14/21    Next Appointment: 7/11/2022      Requested Prescriptions     Pending Prescriptions Disp Refills    clonazePAM (KLONOPIN) 1 MG tablet [Pharmacy Med Name: clonazePAM 1 MG TABLET] 60 tablet      Sig: TAKE ONE TABLET BY MOUTH TWICE A DAY AS NEEDED FOR ANXIETY

## 2022-06-01 NOTE — TELEPHONE ENCOUNTER
Left message for patient to call back.    Please help schedule with Dr. Cheyenne Vera and 1st COVID test. Bilobed Flap Text: The defect edges were debeveled with a #15 scalpel blade.  Given the location of the defect and the proximity to free margins a bilobe flap was deemed most appropriate.  Using a sterile surgical marker, an appropriate bilobe flap drawn around the defect.    The area thus outlined was incised deep to adipose tissue with a #15 scalpel blade.  The skin margins were undermined to an appropriate distance in all directions utilizing iris scissors.

## 2022-06-03 DIAGNOSIS — F41.9 ANXIETY: ICD-10-CM

## 2022-06-03 DIAGNOSIS — N39.0 RECURRENT UTI: Primary | ICD-10-CM

## 2022-06-03 RX ORDER — CLONAZEPAM 1 MG/1
TABLET ORAL
Qty: 60 TABLET | Refills: 0 | Status: SHIPPED | OUTPATIENT
Start: 2022-06-03 | End: 2022-07-05

## 2022-06-03 NOTE — TELEPHONE ENCOUNTER
Refill sent, if he develops another UTI, I would recommend follow up with urology to see why all of the sudden he is having this issue.

## 2022-06-03 NOTE — TELEPHONE ENCOUNTER
Jordon Zuleta [phone] (home)    is requesting refill(s) of Klonopin      to preferred [facility] [address]    Last OV  5/2/22  Last refill  4/11/22    Pt would also like a refill on keflex, he states that his infection has come back. Pt was informed that he might need an appt but he wanted to ask before to make sure he would need one.

## 2022-06-23 NOTE — TELEPHONE ENCOUNTER
Spoke with pt, provided pt with scheduling information, referral being faxed will be scanned into media once fax confirmation received

## 2022-06-28 DIAGNOSIS — R10.32 LEFT GROIN PAIN: ICD-10-CM

## 2022-06-28 RX ORDER — TIZANIDINE 4 MG/1
TABLET ORAL
Qty: 180 TABLET | Refills: 1 | Status: SHIPPED | OUTPATIENT
Start: 2022-06-28 | End: 2022-09-08 | Stop reason: SDUPTHER

## 2022-06-28 NOTE — TELEPHONE ENCOUNTER
.  Refill Request     CONFIRM preferrred pharmacy with the patient. If Mail Order Rx - Pend for 90 day refill.       Last Seen: Last Seen Department: 4/11/2022  Last Seen by PCP: 4/11/2022    Last Written: 4/29/22 180 with 1     Next Appointment:   Future Appointments   Date Time Provider Ryan Davenport   7/11/2022  2:00 [facility]  [place]   8/22/2022  2:00 PM [facility]  [place] [facility] [place]         Requested Prescriptions     Pending Prescriptions Disp Refills    tiZANidine (ZANAFLEX) 4 MG tablet [Pharmacy Med Name: tiZANidine HCL 4MG TABLET] 180 tablet 1     Sig: TAKE ONE TABLET BY MOUTH EVERY 4 HOURS AS NEEDED FOR PAIN MUST LAST 30 DAYS

## 2022-07-05 DIAGNOSIS — F41.9 ANXIETY: ICD-10-CM

## 2022-07-05 RX ORDER — CLONAZEPAM 1 MG/1
TABLET ORAL
Qty: 60 TABLET | Refills: 0 | Status: SHIPPED | OUTPATIENT
Start: 2022-07-05 | End: 2022-08-04 | Stop reason: SDUPTHER

## 2022-07-05 NOTE — TELEPHONE ENCOUNTER
.  Refill Request - Controlled Substance    CONFIRM preferrred pharmacy with the patient.      Last Seen Department: 4/11/2022  Last Seen by PCP: 4/11/2022    Last Written: 6/3/22 60 with 0     Last UDS: 10/14/21    Med Agreement Signed On: 9/14/21    Next Appointment: 7/11/2022      Requested Prescriptions     Pending Prescriptions Disp Refills    clonazePAM (KLONOPIN) 1 MG tablet [Pharmacy Med Name: clonazePAM 1 MG TABLET] 60 tablet      Sig: TAKE ONE TABLET BY MOUTH TWICE A DAY AS NEEDED FOR ANXIETY

## 2022-07-28 DIAGNOSIS — F41.9 ANXIETY: ICD-10-CM

## 2022-07-28 DIAGNOSIS — F33.41 DEPRESSION, MAJOR, RECURRENT, IN PARTIAL REMISSION (HCC): ICD-10-CM

## 2022-07-28 RX ORDER — QUETIAPINE FUMARATE 400 MG/1
TABLET, FILM COATED ORAL
Qty: 180 TABLET | Refills: 1 | Status: SHIPPED | OUTPATIENT
Start: 2022-07-28

## 2022-07-28 NOTE — TELEPHONE ENCOUNTER
Refill Request     CONFIRM preferrred pharmacy with the patient. If Mail Order Rx - Pend for 90 day refill.       Last Seen: Last Seen Department: 4/11/2022  Last Seen by PCP: 4/11/2022    Last Written: 02/17/2022 180 tablet 1 refill    Next Appointment:   Future Appointments   Date Time Provider Ryan Davenport   8/24/2022 11:30 AM Almas Kruger, APRN - CNP [facility]  [place] - [place]       Future appointment scheduled      Requested Prescriptions     Pending Prescriptions Disp Refills    QUEtiapine (SEROQUEL) 400 MG tablet [Pharmacy Med Name: QUEtiapine FUMARATE 400 MG TAB] 180 tablet 1     Sig: TAKE ONE TABLET BY MOUTH TWICE A DAY

## 2022-08-03 DIAGNOSIS — F41.9 ANXIETY: ICD-10-CM

## 2022-08-03 NOTE — TELEPHONE ENCOUNTER
Refill Request     CONFIRM preferrred pharmacy with the patient. If Mail Order Rx - Pend for 90 day refill.       Last Seen: Last Seen Department: 4/11/2022  Last Seen by PCP: 4/11/2022    Last Written: 7/5/2022 60 with 0     Next Appointment:   Future Appointments   Date Time Provider Ryan Davenport   8/24/2022 11:30 AM Lino Kruger, APRN - CNP [facility]  [place] - [place]       Future appointment scheduled      Requested Prescriptions     Pending Prescriptions Disp Refills    clonazePAM (KLONOPIN) 1 MG tablet 60 tablet 0

## 2022-08-04 RX ORDER — CLONAZEPAM 1 MG/1
TABLET ORAL
Qty: 60 TABLET | Refills: 0 | Status: SHIPPED | OUTPATIENT
Start: 2022-08-04 | End: 2022-09-09 | Stop reason: SDUPTHER

## 2022-08-22 ENCOUNTER — TELEPHONE (OUTPATIENT)
Dept: FAMILY MEDICINE CLINIC | Age: 57
End: 2022-08-22

## 2022-08-22 NOTE — TELEPHONE ENCOUNTER
Valencia Stewart with [facility] clinical center called states she needs clarification if patient is supposed to still be taking the lipitor 20mg that was prescribed on 04/18/22. Patient has not filled medication and when pharmacist called patient he was unsure also. Please clarify. Valencia Stewart can be reached at [phone].  thanks

## 2022-08-24 ENCOUNTER — OFFICE VISIT (OUTPATIENT)
Dept: FAMILY MEDICINE CLINIC | Age: 57
End: 2022-08-24
Payer: MEDICARE

## 2022-08-24 ENCOUNTER — HOSPITAL ENCOUNTER (OUTPATIENT)
Age: 57
Discharge: HOME | End: 2022-08-24
Payer: MEDICARE

## 2022-08-24 VITALS
OXYGEN SATURATION: 97 % | WEIGHT: 271 LBS | TEMPERATURE: 98.3 F | BODY MASS INDEX: 37.94 KG/M2 | HEART RATE: 90 BPM | DIASTOLIC BLOOD PRESSURE: 72 MMHG | SYSTOLIC BLOOD PRESSURE: 104 MMHG | HEIGHT: 71 IN

## 2022-08-24 DIAGNOSIS — R82.90 MALODOROUS URINE: ICD-10-CM

## 2022-08-24 DIAGNOSIS — Z00.00 MEDICARE ANNUAL WELLNESS VISIT, SUBSEQUENT: Primary | ICD-10-CM

## 2022-08-24 LAB
A/G RATIO: 1.1 (ref 1.1–2.2)
ALBUMIN SERPL-MCNC: 4.2 G/DL (ref 3.4–5)
ALP BLD-CCNC: 116 U/L (ref 40–129)
ALT SERPL-CCNC: 11 U/L (ref 10–40)
ANION GAP SERPL CALCULATED.3IONS-SCNC: 13 MMOL/L (ref 3–16)
AST SERPL-CCNC: 9 U/L (ref 15–37)
BASOPHILS ABSOLUTE: 0.1 K/UL (ref 0–0.2)
BASOPHILS RELATIVE PERCENT: 1.2 %
BILIRUB SERPL-MCNC: 0.3 MG/DL (ref 0–1)
BILIRUBIN, POC: NEGATIVE
BLOOD URINE, POC: ABNORMAL
BUN BLDV-MCNC: 17 MG/DL (ref 7–20)
CALCIUM SERPL-MCNC: 9.7 MG/DL (ref 8.3–10.6)
CHLORIDE BLD-SCNC: 98 MMOL/L (ref 99–110)
CLARITY, POC: ABNORMAL
CO2: 26 MMOL/L (ref 21–32)
COLOR, POC: YELLOW
CREAT SERPL-MCNC: 1.7 MG/DL (ref 0.9–1.3)
EOSINOPHILS ABSOLUTE: 0.4 K/UL (ref 0–0.6)
EOSINOPHILS RELATIVE PERCENT: 3 %
GFR AFRICAN AMERICAN: 50
GFR NON-AFRICAN AMERICAN: 42
GLUCOSE BLD-MCNC: 121 MG/DL (ref 70–99)
GLUCOSE URINE, POC: ABNORMAL
HCT VFR BLD CALC: 45.7 % (ref 40.5–52.5)
HEMOGLOBIN: 15.6 G/DL (ref 13.5–17.5)
KETONES, POC: NEGATIVE
LEUKOCYTE EST, POC: NEGATIVE
LYMPHOCYTES ABSOLUTE: 1.5 K/UL (ref 1–5.1)
LYMPHOCYTES RELATIVE PERCENT: 13.2 %
MCH RBC QN AUTO: 28.7 PG (ref 26–34)
MCHC RBC AUTO-ENTMCNC: 34.1 G/DL (ref 31–36)
MCV RBC AUTO: 84.2 FL (ref 80–100)
MONOCYTES ABSOLUTE: 0.5 K/UL (ref 0–1.3)
MONOCYTES RELATIVE PERCENT: 4.3 %
NEUTROPHILS ABSOLUTE: 9 K/UL (ref 1.7–7.7)
NEUTROPHILS RELATIVE PERCENT: 78.3 %
NITRITE, POC: ABNORMAL
PDW BLD-RTO: 16.2 % (ref 12.4–15.4)
PH, POC: 5.5
PLATELET # BLD: 330 K/UL (ref 135–450)
PMV BLD AUTO: 7.2 FL (ref 5–10.5)
POTASSIUM SERPL-SCNC: 4.3 MMOL/L (ref 3.5–5.1)
PROTEIN, POC: ABNORMAL
RBC # BLD: 5.43 M/UL (ref 4.2–5.9)
SODIUM BLD-SCNC: 137 MMOL/L (ref 136–145)
SPECIFIC GRAVITY, POC: 1.02
TOTAL PROTEIN: 8.1 G/DL (ref 6.4–8.2)
UROBILINOGEN, POC: ABNORMAL
WBC # BLD: 11.5 K/UL (ref 4–11)

## 2022-08-24 PROCEDURE — G0439 PPPS, SUBSEQ VISIT: HCPCS | Performed by: NURSE PRACTITIONER

## 2022-08-24 PROCEDURE — 85025 COMPLETE CBC W/AUTO DIFF WBC: CPT

## 2022-08-24 PROCEDURE — 36415 COLL VENOUS BLD VENIPUNCTURE: CPT

## 2022-08-24 PROCEDURE — 80053 COMPREHEN METABOLIC PANEL: CPT

## 2022-08-24 PROCEDURE — 81002 URINALYSIS NONAUTO W/O SCOPE: CPT | Performed by: NURSE PRACTITIONER

## 2022-08-24 RX ORDER — SULFAMETHOXAZOLE AND TRIMETHOPRIM 800; 160 MG/1; MG/1
1 TABLET ORAL 2 TIMES DAILY
Qty: 14 TABLET | Refills: 0 | Status: SHIPPED
Start: 2022-08-24 | End: 2022-08-26 | Stop reason: SINTOL

## 2022-08-24 ASSESSMENT — PATIENT HEALTH QUESTIONNAIRE - PHQ9
6. FEELING BAD ABOUT YOURSELF - OR THAT YOU ARE A FAILURE OR HAVE LET YOURSELF OR YOUR FAMILY DOWN: 3
1. LITTLE INTEREST OR PLEASURE IN DOING THINGS: 3
SUM OF ALL RESPONSES TO PHQ9 QUESTIONS 1 & 2: 6
SUM OF ALL RESPONSES TO PHQ QUESTIONS 1-9: 23
2. FEELING DOWN, DEPRESSED OR HOPELESS: 3
5. POOR APPETITE OR OVEREATING: 2
8. MOVING OR SPEAKING SO SLOWLY THAT OTHER PEOPLE COULD HAVE NOTICED. OR THE OPPOSITE, BEING SO FIGETY OR RESTLESS THAT YOU HAVE BEEN MOVING AROUND A LOT MORE THAN USUAL: 3
4. FEELING TIRED OR HAVING LITTLE ENERGY: 3
7. TROUBLE CONCENTRATING ON THINGS, SUCH AS READING THE NEWSPAPER OR WATCHING TELEVISION: 3
SUM OF ALL RESPONSES TO PHQ QUESTIONS 1-9: 25
3. TROUBLE FALLING OR STAYING ASLEEP: 3
10. IF YOU CHECKED OFF ANY PROBLEMS, HOW DIFFICULT HAVE THESE PROBLEMS MADE IT FOR YOU TO DO YOUR WORK, TAKE CARE OF THINGS AT HOME, OR GET ALONG WITH OTHER PEOPLE: 2
SUM OF ALL RESPONSES TO PHQ QUESTIONS 1-9: 25
9. THOUGHTS THAT YOU WOULD BE BETTER OFF DEAD, OR OF HURTING YOURSELF: 2
SUM OF ALL RESPONSES TO PHQ QUESTIONS 1-9: 25

## 2022-08-24 ASSESSMENT — COLUMBIA-SUICIDE SEVERITY RATING SCALE - C-SSRS
2. HAVE YOU ACTUALLY HAD ANY THOUGHTS OF KILLING YOURSELF?: NO
1. WITHIN THE PAST MONTH, HAVE YOU WISHED YOU WERE DEAD OR WISHED YOU COULD GO TO SLEEP AND NOT WAKE UP?: NO
6. HAVE YOU EVER DONE ANYTHING, STARTED TO DO ANYTHING, OR PREPARED TO DO ANYTHING TO END YOUR LIFE?: NO

## 2022-08-24 ASSESSMENT — ANXIETY QUESTIONNAIRES
7. FEELING AFRAID AS IF SOMETHING AWFUL MIGHT HAPPEN: 2
1. FEELING NERVOUS, ANXIOUS, OR ON EDGE: 3
3. WORRYING TOO MUCH ABOUT DIFFERENT THINGS: 3
4. TROUBLE RELAXING: 3
IF YOU CHECKED OFF ANY PROBLEMS ON THIS QUESTIONNAIRE, HOW DIFFICULT HAVE THESE PROBLEMS MADE IT FOR YOU TO DO YOUR WORK, TAKE CARE OF THINGS AT HOME, OR GET ALONG WITH OTHER PEOPLE: SOMEWHAT DIFFICULT
5. BEING SO RESTLESS THAT IT IS HARD TO SIT STILL: 1
2. NOT BEING ABLE TO STOP OR CONTROL WORRYING: 3
6. BECOMING EASILY ANNOYED OR IRRITABLE: 2
GAD7 TOTAL SCORE: 17

## 2022-08-24 ASSESSMENT — LIFESTYLE VARIABLES
HOW MANY STANDARD DRINKS CONTAINING ALCOHOL DO YOU HAVE ON A TYPICAL DAY: PATIENT DOES NOT DRINK
HOW OFTEN DO YOU HAVE A DRINK CONTAINING ALCOHOL: NEVER

## 2022-08-24 NOTE — PROGRESS NOTES
Factor Screenings with Interventions:      Depression:  PHQ-2 Score: 6  PHQ-9 Total Score: 25    Severity:1-4 = minimal depression, 5-9 = mild depression, 10-14 = moderate depression, 15-19 = moderately severe depression, 20-27 = severe depression  Depression Interventions:  Counseling/psychotherapy referral ordered for further evaluation/treatment  Denies suicidal ideation. Feels useless at times. Poor exercise.        Tobacco Use:  Tobacco Use: High Risk    Smoking Tobacco Use: Every Day    Smokeless Tobacco Use: Never     E-cigarette/Vaping       Questions Responses    E-cigarette/Vaping Use     Start Date     Passive Exposure     Quit Date     Counseling Given     Comments           Substance Use - Tobacco Interventions:  tobacco cessation tips and resources provided     Drug Use Screening: DAST-10 Score: 1  DAST-10 Score Interpretation:  1-2: Low level - Monitor, re-assess at a later date; 3-5: Moderate level - Further Investigation; 6-8: Substantial level - Intensive Assessment; 9-10: Severe level - Intensive Assessment  Substance Use - Drug Use Interventions:  educational materials provided       General Health and ACP:  General  In general, how would you say your health is?: (!) Poor  In the past 7 days, have you experienced any of the following: New or Increased Pain, New or Increased Fatigue, Loneliness, Social Isolation, Stress or Anger?: (!) Yes  Select all that apply: (!) Social Isolation, Loneliness, Stress, Anger  Do you get the social and emotional support that you need?: (!) No  Do you have a Living Will?: (!) No    Advance Directives       Power of  Living Will ACP-Advance Directive ACP-Power of     Not on File Not on File Not on File Not on File        General Health Risk Interventions:  Social isolation: patient declines any further intervention for this issue  No Living Will: Advance Care Planning addressed with patient today and Patient declines ACP discussion/assistance    Health Habits/Nutrition:  Physical Activity: Inactive    Days of Exercise per Week: 1 day    Minutes of Exercise per Session: 0 min     Have you lost any weight without trying in the past 3 months?: No  Body mass index: (!) 37.79  Have you seen the dentist within the past year?: (!) No  Health Habits/Nutrition Interventions:  Dental exam overdue:  patient encouraged to make appointment with his/her dentist    Hearing/Vision:  Do you or your family notice any trouble with your hearing that hasn't been managed with hearing aids?: (!) Yes  Do you have difficulty driving, watching TV, or doing any of your daily activities because of your eyesight?: No  Have you had an eye exam within the past year?: (!) No  No results found.   Hearing/Vision Interventions:  Hearing concerns:  patient declines any further evaluation/treatment for hearing issues  Vision concerns:  patient encouraged to make appointment with his/her eye specialist    Safety:  Do you have working smoke detectors?: Yes  Do you have any tripping hazards - loose or unsecured carpets or rugs?: No  Do you have any tripping hazards - clutter in doorways, halls, or stairs?: No  Do you have either shower bars, grab bars, non-slip mats or non-slip surfaces in your shower or bathtub?: (!) No  Do all of your stairways have a railing or banister?: Yes  Do you always fasten your seatbelt when you are in a car?: Yes  Safety Interventions:  Home safety tips provided    ADLs:  In the past 7 days, did you need help from others to perform any of the following everyday activities: Eating, dressing, grooming, bathing, toileting, or walking/balance?: No  In the past 7 days, did you need help from others to take care of any of the following: Laundry, housekeeping, banking/finances, shopping, telephone use, food preparation, transportation, or taking medications?: (!) Yes  Select all that apply: [employer], Housekeeping, Shopping  ADL Interventions:  Patient declines any further evaluation/treatment for this issue          Objective   Vitals:    08/24/22 1131   BP: 104/72   Site: Right Upper Arm   Position: Sitting   Cuff Size: Medium Adult   Pulse: 90   Temp: 98.3 °F (36.8 °C)   TempSrc: Oral   SpO2: 97%   Weight: 271 lb (122.9 kg)   Height: 5' 11\" (1.803 m)      Body mass index is 37.8 kg/m².       General Appearance: alert and oriented to person, place and time, well developed and well- nourished, in no acute distress  Skin: warm and dry, no rash or erythema  Head: normocephalic and atraumatic  Eyes: pupils equal, round, and reactive to light, extraocular eye movements intact, conjunctivae normal  ENT: tympanic membrane, external ear and ear canal normal bilaterally, nose without deformity, nasal mucosa and turbinates normal without polyps  Neck: supple and non-tender without mass, no thyromegaly or thyroid nodules, no cervical lymphadenopathy  Pulmonary/Chest: clear to auscultation bilaterally- no wheezes, rales or rhonchi, normal air movement, no respiratory distress  Cardiovascular: normal rate, regular rhythm, normal S1 and S2, no murmurs, rubs, clicks, or gallops, distal pulses intact, no carotid bruits  Abdomen: soft, non-tender, non-distended, normal bowel sounds, no masses or organomegaly  Extremities: no cyanosis, clubbing or edema  Musculoskeletal: normal range of motion, no joint swelling, deformity or tenderness  Neurologic: reflexes normal and symmetric, no cranial nerve deficit, gait, coordination and speech normal       Allergies   Allergen Reactions    Citalopram Hydrobromide Nausea Only    Cymbalta [Duloxetine Hcl] Other (See Comments)     headache    Darvocet [Propoxyphene N-Acetaminophen] Nausea Only    Escitalopram Oxalate Nausea Only    Lyrica [Pregabalin] Other (See Comments)     Depression suicidal    Morphine Other (See Comments)     Felt strange/\"loopy\"    Neurontin [Gabapentin] Nausea Only    Oxycodone     Protonix [Pantoprazole Sodium Sesquihydrate] Other (See Comments)     headache     Prior to Visit Medications    Medication Sig Taking? Authorizing Provider   clonazePAM (KLONOPIN) 1 MG tablet TAKE ONE TABLET BY MOUTH TWICE A DAY AS NEEDED FOR ANXIETY Yes May Christopher Kruger APRN - CNP   QUEtiapine (SEROQUEL) 400 MG tablet TAKE ONE TABLET BY MOUTH TWICE A DAY Yes May Christopher Kruger APRN - CNP   tiZANidine (ZANAFLEX) 4 MG tablet TAKE ONE TABLET BY MOUTH EVERY 4 HOURS AS NEEDED FOR PAIN MUST LAST 30 DAYS Yes BRIANNA Mejía   atorvastatin (LIPITOR) 20 MG tablet Take 1 tablet by mouth daily Yes BRIANNA Mejía   OXcarbazepine (TRILEPTAL) 150 MG tablet Take 1 tab orally nightly Yes RAUL Chatamn - CNP   levothyroxine (SYNTHROID) 88 MCG tablet TAKE ONE TABLET BY MOUTH DAILY Yes BRIANNA Mejía   ergocalciferol (DRISDOL) 1.25 MG (18765 UT) capsule Take 1 capsule by mouth twice a week (for 6 months total) for Vitamin D deficiency.  Yes BRIANNA Mejía   naloxone 4 MG/0.1ML LIQD nasal spray 1 spray by Nasal route as needed for Opioid Reversal Yes Mary Hayward MD   DULoxetine (CYMBALTA) 30 MG extended release capsule Take 1 capsule by mouth daily  Patient not taking: Reported on 8/24/2022  BRIANNA Mejía   linaclotide Ulyess Said) 145 MCG capsule Take 1 capsule by mouth every morning (before breakfast)  Patient not taking: Reported on 8/24/2022  BRIANNA Mejía   fenofibrate (TRIGLIDE) 160 MG tablet Take 1 tablet by mouth daily  Patient not taking: Reported on 8/24/2022  BRIANNA Mejía       CareTeam (Including outside providers/suppliers regularly involved in providing care):   Patient Care Team:  BRIANNA Mejía as PCP - General (Physician Assistant)  BRIANNA Mejía as PCP - [facility][1] [facility] Provider  Pietro Frost MD as Consulting Physician (Otolaryngology)     Reviewed and updated this visit:  Tobacco  Allergies  Meds  Med Hx  Surg Hx  Soc Hx  Fam Hx

## 2022-08-24 NOTE — PATIENT INSTRUCTIONS
Personalized Preventive Plan for Kathe Lindsey - 8/24/2022  Medicare offers a range of preventive health benefits. Some of the tests and screenings are paid in full while other may be subject to a deductible, co-insurance, and/or copay. Some of these benefits include a comprehensive review of your medical history including lifestyle, illnesses that may run in your family, and various assessments and screenings as appropriate. After reviewing your medical record and screening and assessments performed today your provider may have ordered immunizations, labs, imaging, and/or referrals for you. A list of these orders (if applicable) as well as your Preventive Care list are included within your After Visit Summary for your review. Other Preventive Recommendations:    A preventive eye exam performed by an eye specialist is recommended every 1-2 years to screen for glaucoma; cataracts, macular degeneration, and other eye disorders. A preventive dental visit is recommended every 6 months. Try to get at least 150 minutes of exercise per week or 10,000 steps per day on a pedometer . Order or download the FREE \"Exercise & Physical Activity: Your Everyday Guide\" from The "[employer]. Call 3-454.209.1369 or search The "[employer] online. You need 3594-5956 mg of calcium and 5364-9635 IU of vitamin D per day. It is possible to meet your calcium requirement with diet alone, but a vitamin D supplement is usually necessary to meet this goal.  When exposed to the sun, use a sunscreen that protects against both UVA and UVB radiation with an SPF of 30 or greater. Reapply every 2 to 3 hours or after sweating, drying off with a towel, or swimming. Always wear a seat belt when traveling in a car. Always wear a helmet when riding a bicycle or motorcycle.

## 2022-08-26 ENCOUNTER — TELEPHONE (OUTPATIENT)
Dept: FAMILY MEDICINE CLINIC | Age: 57
End: 2022-08-26

## 2022-08-26 DIAGNOSIS — R31.9 URINARY TRACT INFECTION WITH HEMATURIA, SITE UNSPECIFIED: ICD-10-CM

## 2022-08-26 DIAGNOSIS — R10.32 LEFT GROIN PAIN: ICD-10-CM

## 2022-08-26 DIAGNOSIS — R10.9 FLANK PAIN: Primary | ICD-10-CM

## 2022-08-26 DIAGNOSIS — N39.0 URINARY TRACT INFECTION WITH HEMATURIA, SITE UNSPECIFIED: ICD-10-CM

## 2022-08-26 LAB
ORGANISM: ABNORMAL
URINE CULTURE, ROUTINE: ABNORMAL

## 2022-08-26 RX ORDER — TIZANIDINE 4 MG/1
TABLET ORAL
Qty: 180 TABLET | Refills: 1 | OUTPATIENT
Start: 2022-08-26

## 2022-08-26 RX ORDER — CIPROFLOXACIN 500 MG/1
500 TABLET, FILM COATED ORAL 2 TIMES DAILY
Qty: 14 TABLET | Refills: 0 | Status: SHIPPED | OUTPATIENT
Start: 2022-08-26 | End: 2022-09-02

## 2022-08-26 NOTE — TELEPHONE ENCOUNTER
Was told by  staff that radiology called and pt never showed for stat CT. I called pt stated he was too weak, and was vomiting so he couldn't make it. He also said he hasn't been able to eat or drink today. I recommend pt go to ER due to worsening condition, concern for progression of infection, inability to tolerate oral intake, and frail condition. With pt's permission I spoke with pt's son Joycelyn Cordova who lives with him, and told him my recommendations for pt to go to ER at this time. He agreed with plan and agreed to take pt to ER.

## 2022-08-26 NOTE — TELEPHONE ENCOUNTER
Refill Request     CONFIRM preferrred pharmacy with the patient. If Mail Order Rx - Pend for 90 day refill.       Last Seen: Last Seen Department: 8/24/2022  Last Seen by PCP: 4/11/2022    Last Written: 06/28/2022 180 tab 1 refill    Next Appointment:   Future Appointments   Date Time Provider Ryan Davenport   9/8/2022  2:00 PM RAUL Lynn CNP [facility] [place] - [place]   8/28/2023  3:00 PM BRIANNA Ramirez [facility] - [place]       Future appointment scheduled      Requested Prescriptions     Pending Prescriptions Disp Refills    tiZANidine (Marcos Lopez) 4 MG tablet [Pharmacy Med Name: tiZANidine HCL 4MG TABLET] 180 tablet 1     Sig: TAKE ONE TABLET BY MOUTH EVERY 4 HOURS AS NEEDED FOR PAIN MUST LAST 30 DAYS

## 2022-08-26 NOTE — TELEPHONE ENCOUNTER
Spoke with pt to review culture results. Pt is still not feeling well. Vomited this AM. Only has taken 3 doses of bactrim. Unsure if upset stomach was related to bactrim. Stat CT to rule out pyelonephritis/stone. Denies fevers, abdominal pain. Stop bactrim, start cipro. Any change recommend going to ER. If pt does not answer phone he has given verbal consent to contact son Krystal Pinto or daughter Cathryn Pond listed in contact list. [facility] to stay by phone. Very low threshold for ER, pt aware of when to go to ER. Crcl 63. Cr 1.7. Stop zanaflex while on cipro.

## 2022-08-26 NOTE — TELEPHONE ENCOUNTER
[place] [facility] [place] [facility] for [facility] at [facility] & informed to head to imaging office and they will work him in if able to arrive sooner. Patient voiced understanding.

## 2022-08-29 DIAGNOSIS — R10.32 LEFT GROIN PAIN: ICD-10-CM

## 2022-08-29 NOTE — TELEPHONE ENCOUNTER
----- Message from Binu Hernandez sent at 8/29/2022  3:55 PM EDT -----  Subject: Refill Request    QUESTIONS  Name of Medication? tiZANidine (ZANAFLEX) 4 MG tablet  Patient-reported dosage and instructions? 4 mg TAKE ONE TABLET BY MOUTH   EVERY 4 HOURS AS NEEDED FOR PAIN MUST LAST 30 DAYS  How many days do you have left? 0  [facility]erred [facility]acy? [facility] [phone]  Pharmacy phone number (if available)? [phone]  ---------------------------------------------------------------------------  --------------  Jodi Argue INFO  What is the best way for the office to contact you? OK to leave message on   voicemail  Preferred Call Back Phone Number? [phone]  ---------------------------------------------------------------------------  --------------  SCRIPT ANSWERS  Relationship to Patient?  Self

## 2022-08-29 NOTE — TELEPHONE ENCOUNTER
Refill Request     CONFIRM preferrred pharmacy with the patient. If Mail Order Rx - Pend for 90 day refill.       Last Seen: Last Seen Department: 8/24/2022  Last Seen by PCP: 4/11/2022    Last Written: 6/28/2022, #180, 0 refills     Next Appointment:   Future Appointments   Date Time Provider Ryan Davenport   9/8/2022  2:00 PM RAUL Ceron CNP  [place] - [place]   8/28/2023  3:00 PM Delvis BRIANNA Valencia  [place] - [place]       Future appointment scheduled      Requested Prescriptions     Pending Prescriptions Disp Refills    tiZANidine (Tania Coad) 4 MG tablet [Pharmacy Med Name: tiZANidine HCL 4MG TABLET] 180 tablet 1     Sig: TAKE ONE TABLET BY MOUTH EVERY 4 HOURS AS NEEDED FOR PAIN MUST LAST 30 DAYS

## 2022-08-29 NOTE — TELEPHONE ENCOUNTER
Refill Request     CONFIRM preferrred pharmacy with the patient. If Mail Order Rx - Pend for 90 day refill.       Last Seen: Last Seen Department: 8/24/2022  Last Seen by PCP: 4/11/2022    Last Written: 06/28/2022 180 tablet 1 refills     Next Appointment:   Future Appointments   Date Time Provider Ryan Davenport   9/8/2022  2:00 PM RAUL Tian CNP [facility] [place] - [place]   8/28/2023  3:00 PM BRIANNA Villalobos [facility] - [place]       Future appointment scheduled      Requested Prescriptions     Pending Prescriptions Disp Refills    tiZANidine (Rigo Givens) 4 MG tablet [Pharmacy Med Name: tiZANidine HCL 4MG TABLET] 180 tablet 1     Sig: TAKE ONE TABLET BY MOUTH EVERY 4 HOURS AS NEEDED FOR PAIN MUST LAST 30 DAYS

## 2022-08-31 ENCOUNTER — TELEPHONE (OUTPATIENT)
Dept: FAMILY MEDICINE CLINIC | Age: 57
End: 2022-08-31

## 2022-08-31 NOTE — TELEPHONE ENCOUNTER
Luis ARGUETA [facility] Support  Subject: Message to Provider     QUESTIONS   Information for Provider? Pt said that pharmacy does not have prescription   yet for his tiZANidine (ZANAFLEX) 4 MG tablet. It was to be sent to   [facility] [address]. Their phone is [phone].  Please advise pt.   ---------------------------------------------------------------------------   --------------   Daryle Haver INFO   [phone]; OK to leave message on voicemail

## 2022-09-01 RX ORDER — TIZANIDINE 4 MG/1
TABLET ORAL
Qty: 180 TABLET | Refills: 1 | OUTPATIENT
Start: 2022-09-01

## 2022-09-01 NOTE — TELEPHONE ENCOUNTER
Refill Request     CONFIRM preferrred pharmacy with the patient. If Mail Order Rx - Pend for 90 day refill.       Last Seen: Last Seen Department: 8/24/2022  Last Seen by PCP: 4/11/2022    Last Written: 06/28/2022 180 tablet 1 refills     Next Appointment:   Future Appointments   Date Time Provider Ryan Davenport   9/8/2022  2:00 PM RAUL Hutson CNP  [place] - [place]   8/28/2023  3:00 PM BRIANNA Molina  [place] - [place]       Future appointment scheduled      Requested Prescriptions     Pending Prescriptions Disp Refills    tiZANidine (Margarita Roland) 4 MG tablet [Pharmacy Med Name: tiZANidine HCL 4MG TABLET] 180 tablet 1     Sig: TAKE ONE TABLET BY MOUTH EVERY 4 HOURS AS NEEDED FOR PAIN MUST LAST 30 DAYS

## 2022-09-01 NOTE — TELEPHONE ENCOUNTER
Pt states- he uses the medication for chronic muscle spasms- he's been taking this medication for the past 15- 20 years he states.

## 2022-09-01 NOTE — TELEPHONE ENCOUNTER
Please inform patient that he needs to make an appointment to follow up with Chayito Kruger to discuss FMLA and pain management. He is taking three medications that are very sedating and therefore, can be unsafe when taken together.

## 2022-09-01 NOTE — TELEPHONE ENCOUNTER
Please ask what patient is taking this medication for? It is not indicated for long term pain relief, has habit forming potential and is very sedating.  TY

## 2022-09-01 NOTE — TELEPHONE ENCOUNTER
Willi enciso, I have only met him one time, I had held due to interaction with cipro, and concern for worsening condition. Pain management would likely be a good option for him. He should have f/u soon with me to discuss fmla.

## 2022-09-01 NOTE — TELEPHONE ENCOUNTER
Please have pt make appointment to discuss, on multiple sedating medications. Also needs appointment for fmla. Can be same appointment.

## 2022-09-05 DIAGNOSIS — F41.9 ANXIETY: ICD-10-CM

## 2022-09-05 NOTE — TELEPHONE ENCOUNTER
Refill Request - Controlled Substance    CONFIRM preferrred pharmacy with the patient.      Last Seen Department: 8/24/2022  Last Seen by PCP: 8/24/2022    Last Written: 8/4/2022 60 tablet 0 refills    Last UDS: 5/8/2020    Med Agreement Signed On: 5/8/2020    Next Appointment: 9/8/2022    Future appointment scheduled    Requested Prescriptions     Pending Prescriptions Disp Refills    clonazePAM (KLONOPIN) 1 MG tablet [Pharmacy Med Name: clonazePAM 1 MG TABLET] 60 tablet      Sig: TAKE ONE TABLET BY MOUTH TWICE A DAY AS NEEDED FOR ANXIETY

## 2022-09-08 ENCOUNTER — OFFICE VISIT (OUTPATIENT)
Dept: FAMILY MEDICINE CLINIC | Age: 57
End: 2022-09-08
Payer: MEDICARE

## 2022-09-08 VITALS — OXYGEN SATURATION: 97 % | SYSTOLIC BLOOD PRESSURE: 130 MMHG | DIASTOLIC BLOOD PRESSURE: 70 MMHG | HEART RATE: 96 BPM

## 2022-09-08 DIAGNOSIS — E55.9 VITAMIN D DEFICIENCY: ICD-10-CM

## 2022-09-08 DIAGNOSIS — F41.9 ANXIETY: ICD-10-CM

## 2022-09-08 DIAGNOSIS — E03.9 HYPOTHYROIDISM, UNSPECIFIED TYPE: ICD-10-CM

## 2022-09-08 DIAGNOSIS — E11.9 TYPE 2 DIABETES MELLITUS WITHOUT COMPLICATION, WITHOUT LONG-TERM CURRENT USE OF INSULIN (HCC): Primary | ICD-10-CM

## 2022-09-08 DIAGNOSIS — R10.32 LEFT GROIN PAIN: ICD-10-CM

## 2022-09-08 PROCEDURE — 83036 HEMOGLOBIN GLYCOSYLATED A1C: CPT | Performed by: NURSE PRACTITIONER

## 2022-09-08 PROCEDURE — 3044F HG A1C LEVEL LT 7.0%: CPT | Performed by: NURSE PRACTITIONER

## 2022-09-08 PROCEDURE — 99213 OFFICE O/P EST LOW 20 MIN: CPT | Performed by: NURSE PRACTITIONER

## 2022-09-08 RX ORDER — TIZANIDINE 4 MG/1
TABLET ORAL
Qty: 120 TABLET | Refills: 1 | Status: SHIPPED | OUTPATIENT
Start: 2022-09-08 | End: 2022-09-30 | Stop reason: SDUPTHER

## 2022-09-08 RX ORDER — CLONAZEPAM 1 MG/1
TABLET ORAL
Qty: 60 TABLET | Refills: 0 | Status: CANCELLED | OUTPATIENT
Start: 2022-09-08 | End: 2022-10-08

## 2022-09-08 RX ORDER — LEVOTHYROXINE SODIUM 88 UG/1
TABLET ORAL
Qty: 90 TABLET | Refills: 1 | Status: SHIPPED
Start: 2022-09-08 | End: 2022-09-09 | Stop reason: DRUGHIGH

## 2022-09-08 RX ORDER — ERGOCALCIFEROL (VITAMIN D2) 1250 MCG
50000 CAPSULE ORAL
Qty: 8 CAPSULE | Refills: 5 | Status: CANCELLED | OUTPATIENT
Start: 2022-09-08

## 2022-09-08 NOTE — PROGRESS NOTES
2022  Mike Tan (: 1965)  61 y.o.    ASSESSMENT and PLAN:  Greta Whipple was seen today for forms and discuss medications. Diagnoses and all orders for this visit:    Type 2 diabetes mellitus without complication, without long-term current use of insulin (HCC)  -     POCT glycosylated hemoglobin (Hb A1C)  -update a1c  -The current medical regimen is effective;  continue present plan and medications. Hypothyroidism, unspecified type  -     levothyroxine (SYNTHROID) 88 MCG tablet; TAKE ONE TABLET BY MOUTH DAILY  -     TSH with Reflex; Future  -update tsh. Adjust meds accordingly. Vitamin D deficiency  -     Vitamin D 25 Hydroxy; Future  -rule out vitamin d deficiency. Left groin pain  -     tiZANidine (ZANAFLEX) 4 MG tablet; TAKE ONE TABLET BY MOUTH EVERY 6 HOURS AS NEEDED FOR PAIN MUST LAST 30 DAYS  -     External Referral To Pain Clinic  -re-establish with pain clinic.   -discussed importance of using mediations appropriately. -recommend re-establishing with rheum. Anxiety  -The current medical regimen is effective;  continue present plan and medications. Return in about 3 months (around 2022), or if symptoms worsen or fail to improve. HPI    Routine follow up chronic conditions. currently on disability requesting paperwork to be filled out. DM: Diet controlled. Does not monitor BG. Last a1c 6.4. Diet controlled. Microalbumin-last . Requesting Disability update. Employer-disabled since  d/t Retropertitonal fibrosis. Was seeing rheumatology, needs to reestablish. [facility] rheum. Family med used to follow . Symptoms currently having include pain, discomfort. Has been on chronic pain meds for many years. Chronic pain: was recently dismissed from pain management. Had ran out of meds early. Has been off narcotics for 2 wks or so. Pain 9/10 currently. Denies withdraw symptoms currently. Anxiety-He continues on klonopin 1 mg bid as needed. PDMP reviewed. for his anxiety which has not been well controlled. On seroquel 400 mg,   Has been on cymbalta in the past- listed as an allergy but patient does not think this is the case. Review of Systems   Constitutional:  Positive for fatigue. Negative for activity change, appetite change, chills, fever and unexpected weight change. HENT: Negative. Respiratory:  Negative for cough, chest tightness, shortness of breath and wheezing. Cardiovascular:  Negative for chest pain, palpitations and leg swelling. Gastrointestinal:  Negative for abdominal distention, abdominal pain, constipation, diarrhea, nausea and vomiting. Genitourinary: Negative. Musculoskeletal: Negative. Pain to groin. Generalized pain. Skin: Negative. Neurological:  Negative for dizziness, weakness, light-headedness, numbness and headaches. Hematological: Negative. Psychiatric/Behavioral:  Positive for dysphoric mood. Negative for self-injury. Allergies, past medical history, family history, and social history reviewed and unchanged from previous encounter. Current Outpatient Medications   Medication Sig Dispense Refill    QUEtiapine (SEROQUEL) 400 MG tablet TAKE ONE TABLET BY MOUTH TWICE A  tablet 1    tiZANidine (ZANAFLEX) 4 MG tablet TAKE ONE TABLET BY MOUTH EVERY 4 HOURS AS NEEDED FOR PAIN MUST LAST 30 DAYS 180 tablet 1    levothyroxine (SYNTHROID) 88 MCG tablet TAKE ONE TABLET BY MOUTH DAILY 90 tablet 1    ergocalciferol (DRISDOL) 1.25 MG (70511 UT) capsule Take 1 capsule by mouth twice a week (for 6 months total) for Vitamin D deficiency.  8 capsule 5    clonazePAM (KLONOPIN) 1 MG tablet TAKE ONE TABLET BY MOUTH TWICE A DAY AS NEEDED FOR ANXIETY 60 tablet 0    atorvastatin (LIPITOR) 20 MG tablet Take 1 tablet by mouth daily (Patient not taking: Reported on 9/8/2022) 90 tablet 1    OXcarbazepine (TRILEPTAL) 150 MG tablet Take 1 tab orally nightly (Patient not taking: Reported on 9/8/2022) 30 tablet 0    DULoxetine (CYMBALTA) 30 MG extended release capsule Take 1 capsule by mouth daily (Patient not taking: No sig reported) 30 capsule 5    linaclotide (LINZESS) 145 MCG capsule Take 1 capsule by mouth every morning (before breakfast) (Patient not taking: No sig reported) 30 capsule 5    fenofibrate (TRIGLIDE) 160 MG tablet Take 1 tablet by mouth daily (Patient not taking: No sig reported) 90 tablet 1    naloxone 4 MG/0.1ML LIQD nasal spray 1 spray by Nasal route as needed for Opioid Reversal (Patient not taking: Reported on 9/8/2022) 1 each 5     No current facility-administered medications for this visit. Vitals:    09/08/22 1410   BP: 130/70   Site: Right Upper Arm   Position: Sitting   Cuff Size: Medium Adult   Pulse: 96   SpO2: 97%     Estimated body mass index is 37.8 kg/m² as calculated from the following:    Height as of 8/24/22: 5' 11\" (1.803 m). Weight as of 8/24/22: 271 lb (122.9 kg). Physical Exam  Vitals reviewed. Constitutional:       General: He is not in acute distress. Appearance: Normal appearance. He is ill-appearing. He is not toxic-appearing or diaphoretic. HENT:      Head: Normocephalic and atraumatic. Nose: Nose normal.   Eyes:      Conjunctiva/sclera: Conjunctivae normal.   Cardiovascular:      Rate and Rhythm: Normal rate and regular rhythm. Pulses: Normal pulses. Heart sounds: Normal heart sounds. Pulmonary:      Effort: Pulmonary effort is normal. No respiratory distress. Breath sounds: Normal breath sounds. No wheezing or rhonchi. Chest:      Chest wall: No tenderness. Abdominal:      General: Abdomen is flat. Bowel sounds are normal. There is no distension. Palpations: Abdomen is soft. There is no mass. Tenderness: There is no abdominal tenderness. There is no guarding or rebound. Hernia: No hernia is present.    Musculoskeletal:         General: Tenderness

## 2022-09-08 NOTE — TELEPHONE ENCOUNTER
Refill Request     CONFIRM preferrred pharmacy with the patient. If Mail Order Rx - Pend for 90 day refill.       Last Seen: Last Seen Department: 9/8/2022  Last Seen by PCP: 4/11/2022    Last Written: 8/4/2022 120 with 1     Next Appointment:   Future Appointments   Date Time Provider Ryan Davenport   12/8/2022  1:30 PM BRIANNA Mejía   8/28/2023  3:00 PM BRIANNA Mejía  [place]       Future appointment scheduled      Requested Prescriptions     Pending Prescriptions Disp Refills    clonazePAM (KLONOPIN) 1 MG tablet 60 tablet 0     Sig: TAKE ONE TABLET BY MOUTH TWICE A DAY AS NEEDED FOR ANXIETY

## 2022-09-09 ENCOUNTER — TELEPHONE (OUTPATIENT)
Dept: FAMILY MEDICINE CLINIC | Age: 57
End: 2022-09-09

## 2022-09-09 DIAGNOSIS — E03.9 HYPOTHYROIDISM, UNSPECIFIED TYPE: Primary | ICD-10-CM

## 2022-09-09 LAB
T4 FREE: 0.8 NG/DL (ref 0.9–1.8)
TSH REFLEX: 6.23 UIU/ML (ref 0.27–4.2)
VITAMIN D 25-HYDROXY: 25.4 NG/ML

## 2022-09-09 RX ORDER — MELATONIN
1000 DAILY
Qty: 90 TABLET | Refills: 1 | Status: SHIPPED | OUTPATIENT
Start: 2022-09-09

## 2022-09-09 RX ORDER — CLONAZEPAM 1 MG/1
TABLET ORAL
Qty: 60 TABLET | Refills: 0 | Status: SHIPPED | OUTPATIENT
Start: 2022-09-09 | End: 2022-10-11

## 2022-09-09 RX ORDER — LEVOTHYROXINE SODIUM 0.1 MG/1
100 TABLET ORAL DAILY
Qty: 90 TABLET | Refills: 1 | Status: SHIPPED | OUTPATIENT
Start: 2022-09-09

## 2022-09-09 RX ORDER — CLONAZEPAM 1 MG/1
TABLET ORAL
Qty: 60 TABLET | Refills: 0 | OUTPATIENT
Start: 2022-09-09 | End: 2022-10-04

## 2022-09-09 NOTE — TELEPHONE ENCOUNTER
----- Message from Danilo Dumas sent at 9/9/2022 10:32 AM EDT -----  Subject: Message to Provider    QUESTIONS  Information for Provider? Patient called to say that he did not receive   one of his medications. The name of the medication is Clonazepam. Please   call patient back and advise.  ---------------------------------------------------------------------------  --------------  [id number] [employer]  [phone]; OK to leave message on voicemail  ---------------------------------------------------------------------------  --------------  SCRIPT ANSWERS  Relationship to Patient?  Self

## 2022-09-09 NOTE — TELEPHONE ENCOUNTER
Refill sent to pharmacy 9/9 at 10:29am.    Attempted to call patient but no answer and unable to leave a message.

## 2022-09-25 ASSESSMENT — ENCOUNTER SYMPTOMS
CONSTIPATION: 0
DIARRHEA: 0
COUGH: 0
SHORTNESS OF BREATH: 0
VOMITING: 0
ABDOMINAL DISTENTION: 0
ABDOMINAL PAIN: 0
WHEEZING: 0
CHEST TIGHTNESS: 0
NAUSEA: 0

## 2022-09-27 DIAGNOSIS — R10.32 LEFT GROIN PAIN: ICD-10-CM

## 2022-09-27 NOTE — TELEPHONE ENCOUNTER
Refill Request     CONFIRM preferrred pharmacy with the patient. If Mail Order Rx - Pend for 90 day refill. Last Seen: Last Seen Department: 9/8/2022  Last Seen by PCP: 4/11/2022    Last Written: 120 with 1 9/8/2022     If no future appointment scheduled, route STAFF MESSAGE with patient name to the [facility] for scheduling. Next Appointment:   Future Appointments   Date Time Provider Ryan Davenport   12/8/2022  1:30 PM BRIANNA Hurley  [place] - [place]   8/28/2023  3:00 PM BRIANNA Hurley  [place] - [place]       Message sent to [address] [place] [address] to schedule appt with patient?   NO      Requested Prescriptions     Pending Prescriptions Disp Refills    ciprofloxacin (CIPRO) 500 MG tablet 14 tablet 0     Sig: Take 1 tablet by mouth 2 times daily for 7 days    tiZANidine (ZANAFLEX) 4 MG tablet 120 tablet 1     Sig: TAKE ONE TABLET BY MOUTH EVERY 6 HOURS AS NEEDED FOR PAIN MUST LAST 30 DAYS

## 2022-09-30 RX ORDER — CIPROFLOXACIN 500 MG/1
500 TABLET, FILM COATED ORAL 2 TIMES DAILY
Qty: 14 TABLET | Refills: 0 | OUTPATIENT
Start: 2022-09-30 | End: 2022-10-07

## 2022-09-30 RX ORDER — TIZANIDINE 4 MG/1
TABLET ORAL
Qty: 120 TABLET | Refills: 1 | Status: SHIPPED | OUTPATIENT
Start: 2022-09-30

## 2022-10-07 DIAGNOSIS — F41.9 ANXIETY: ICD-10-CM

## 2022-10-07 NOTE — TELEPHONE ENCOUNTER
Refill Request - Controlled Substance    CONFIRM preferrred pharmacy with the patient. If Mail Order Rx - Pend for 90 day refill. Last Seen Department: 9/8/2022  Last Seen by PCP: 9/8/2022    Last Written: 09/09/2022 60 tablet 0 refills     Last UDS: 05/08/2020    Med Agreement Signed On: 05/08/2020    If no future appointment scheduled, route STAFF MESSAGE with patient name to the [facility] for scheduling. CONFIRM preferrred pharmacy with the patient. Next Appointment:   Future Appointments   Date Time Provider Ryan Davenport   12/8/2022  1:30 PM BRIANNA [place] - [place]   8/28/2023  3:00 PM BRIANNA James [place] - [place]       Message sent to [address] [place] [address] to schedule appt with patient?   NO      Requested Prescriptions     Pending Prescriptions Disp Refills    clonazePAM (KLONOPIN) 1 MG tablet [Pharmacy Med Name: clonazePAM 1 MG TABLET] 60 tablet      Sig: TAKE ONE TABLET BY MOUTH TWICE A DAY AS NEEDED FOR ANXIETY

## 2022-10-11 ENCOUNTER — TELEPHONE (OUTPATIENT)
Dept: FAMILY MEDICINE CLINIC | Age: 57
End: 2022-10-11

## 2022-10-11 RX ORDER — CLONAZEPAM 1 MG/1
TABLET ORAL
Qty: 60 TABLET | Refills: 0 | Status: SHIPPED | OUTPATIENT
Start: 2022-10-11 | End: 2022-11-06

## 2022-10-11 NOTE — TELEPHONE ENCOUNTER
----- Message from Madhavi sent at 10/11/2022 10:03 AM EDT -----  Subject: Refill Request    QUESTIONS  Name of Medication? clonazePAM (KLONOPIN) 1 MG tablet  Patient-reported dosage and instructions? twice daily   How many days do you have left? 0  Preferred Pharmacy? [facility] [id number]  Pharmacy phone number (if available)? [phone]  ---------------------------------------------------------------------------  --------------  Michele THORPE  What is the best way for the office to contact you? OK to leave message on   voicemail  Preferred Call Back Phone Number? [phone]  ---------------------------------------------------------------------------  --------------  SCRIPT ANSWERS  Relationship to Patient?  Self

## 2022-11-14 DIAGNOSIS — F41.9 ANXIETY: ICD-10-CM

## 2022-11-14 NOTE — TELEPHONE ENCOUNTER
----- Message from Ortega Mason sent at 11/14/2022 10:42 AM EST -----  Subject: Refill Request    QUESTIONS  Name of Medication? clonazePAM (KLONOPIN) 1 MG tablet  Patient-reported dosage and instructions? 2 tabs daily  How many days do you have left? 0  Preferred Pharmacy? [facility] [id number]  Pharmacy phone number (if available)? [phone]  Additional Information for Provider? Been out since Saturday.   ---------------------------------------------------------------------------  --------------  CALL BACK INFO  What is the best way for the office to contact you? OK to leave message on   voicemail  Preferred Call Back Phone Number? [phone]  ---------------------------------------------------------------------------  --------------  SCRIPT ANSWERS  Relationship to Patient?  Self

## 2022-11-14 NOTE — TELEPHONE ENCOUNTER
Refill Request - Controlled Substance    CONFIRM preferrred pharmacy with the patient. If Mail Order Rx - Pend for 90 day refill. Last Seen Department: 9/8/2022  Last Seen by PCP: 4/11/2022    Last Written: 10/11/2022 60 tablet 0 refills     Last UDS: 05/08/2020    Med Agreement Signed On: 05/08/2020    If no future appointment scheduled, route STAFF MESSAGE with patient name to the [facility] for scheduling. CONFIRM preferrred pharmacy with the patient. Next Appointment:   Future Appointments   Date Time Provider Ryan Davenport   12/8/2022  1:30 PM BRIANNA Dominguez - [place]   8/28/2023  3:00 PM BRIANNA Dominguez [place] - [place]       Message sent to [address] [place] [address] to schedule appt with patient?   NO      Requested Prescriptions     Pending Prescriptions Disp Refills    clonazePAM (KLONOPIN) 1 MG tablet 60 tablet 0

## 2022-11-15 DIAGNOSIS — F41.9 ANXIETY: ICD-10-CM

## 2022-11-15 RX ORDER — CLONAZEPAM 1 MG/1
TABLET ORAL
Qty: 60 TABLET | Refills: 0 | OUTPATIENT
Start: 2022-11-15 | End: 2022-12-10

## 2022-11-15 RX ORDER — CLONAZEPAM 1 MG/1
TABLET ORAL
Qty: 60 TABLET | Refills: 0 | Status: SHIPPED | OUTPATIENT
Start: 2022-11-15 | End: 2022-12-11

## 2022-11-15 NOTE — TELEPHONE ENCOUNTER
Patient called in and states that he is completely out of his Klonopin and needs if filled, has been since Saturday. A request was sent to PCP for a refill, but not response. Routing to Dr Hugo Serrano to see that she can fill. Medication has been pended.

## 2022-12-07 ENCOUNTER — TELEPHONE (OUTPATIENT)
Dept: FAMILY MEDICINE CLINIC | Age: 57
End: 2022-12-07

## 2022-12-07 NOTE — TELEPHONE ENCOUNTER
Patient called office, call was transferred to me by phone room for triage. Patient states he has been vomiting, having diarrhea, cold chills- is unsure about fever due to not being able to check it, and reports a productive cough with clear to light green sputum. Denies any dizziness but does become nauseous and vomits when he gets up and moves around. Symptoms started yesterday. Patient states he has not taken anything over the counter to help. Advised trying mucinex to help with the chest congestion. Discussed staying hydrated as much as possible, he states he can drink some water and usually keeps it down for an hour or two and then vomits again. He is unable to keep down food. Offered to schedule patient a virtual visit or send our dispatch health for evaluation. Patient has an OV with PCP tomorrow- 12/8, that was previously scheduled. Patient requested to keep that appointment and if he gets worse will go to ED or Urgent care but he is hopeful that he will have improved some by tomorrow and be able to come in. Patient will call the office if he needs to change to VV or if he decides he needs to cancel or ends up going to ED. Advised patient to continue to rest, try to stay hydrated and to take OTC cough medicine.  Patient verbalized understanding    Electronically signed by Theresa Stanley RN on 12/7/2022 at 11:54 AM

## 2022-12-09 ENCOUNTER — TELEMEDICINE (OUTPATIENT)
Dept: FAMILY MEDICINE CLINIC | Age: 57
End: 2022-12-09
Payer: MEDICARE

## 2022-12-09 DIAGNOSIS — R05.1 ACUTE COUGH: Primary | ICD-10-CM

## 2022-12-09 PROCEDURE — 99441 PR PHYS/QHP TELEPHONE EVALUATION 5-10 MIN: CPT | Performed by: NURSE PRACTITIONER

## 2022-12-09 RX ORDER — GUAIFENESIN 600 MG/1
600 TABLET, EXTENDED RELEASE ORAL 2 TIMES DAILY
Qty: 30 TABLET | Refills: 0 | Status: SHIPPED | OUTPATIENT
Start: 2022-12-09 | End: 2022-12-24

## 2022-12-09 RX ORDER — FLUTICASONE PROPIONATE 50 MCG
2 SPRAY, SUSPENSION (ML) NASAL DAILY
Qty: 16 G | Refills: 0 | Status: SHIPPED | OUTPATIENT
Start: 2022-12-09

## 2022-12-09 RX ORDER — ONDANSETRON 4 MG/1
4 TABLET, FILM COATED ORAL EVERY 12 HOURS PRN
Qty: 14 TABLET | Refills: 0 | Status: SHIPPED | OUTPATIENT
Start: 2022-12-09 | End: 2022-12-16

## 2022-12-09 RX ORDER — BENZONATATE 100 MG/1
100 CAPSULE ORAL 3 TIMES DAILY PRN
Qty: 30 CAPSULE | Refills: 0 | Status: SHIPPED | OUTPATIENT
Start: 2022-12-09 | End: 2022-12-16

## 2022-12-09 NOTE — PROGRESS NOTES
Edward Mathew is a 61 y.o. male evaluated via telephone on 12/9/2022 for Nausea & Vomiting, Chills (Unable to take temperature but feels warm to touch , taken tylenol), Diarrhea (Loose, soft stool, brown), and Sinus Problem (Congestion/runny nose)  . Documentation:  I communicated with the patient and/or health care decision maker about URI. Details of this discussion including any medical advice provided:     Presenting with congestion  Symptoms began 12/7  Associated symptoms include: sinus congestion, post nasal drip, cough, vomiting, nausea. Soft stools. Fevers, chills, generalized body aches. Denies HA, chest pain, chest tightness, shortness of breath, wheezing, cough, congestion? Denies loss of taste or smell  Appetite is poor. Is able to drink fluids. Has tried tylenol. Has been vaccinated flu and covid. Known sick contacts-denies. Plan  --Rule out covid/flu-can try outpatient lab, or go to pharm. If unable to eat/drink go to hospital.   Meds sent   -Quarantine until negative results, if positive recommend isolating for 5 days from symptom onset, and must be afebrile for 48 hrs prior to ending isolation precautions.   -Once isolation has ended, recommend masking at all times when in public, & recommend avoiding situations when mask is removed, like when eating. For example, eat lunch alone or in car to avoid exposure to others.   -Can alternate Tylenol as needed for fever control, comfort.  -Can try herbal tea with honey (avoid if diabetic, or pregnant), and gargling warm salt water.  -If experiencing nausea or poor appetite, recommend trying a \"Clear Liquid\" diet, or the [employer], and advance as tolerated.   -Recommend increasing hydration, and rest  -Can obtain a home pulse oximeter to check SpO2 intermittently, if below 90% I recommend going to ER.   -Side effects of medications reviewed, alarm signs and symptoms reviewed, present to ER or call office if experiencing worsening symptoms or development of sob, cp, SpO2 <90%, lightheadedness/dizziness, or fever unrelieved with tylenol.   -Family members in home should quarantine as well until negative results. Total Time: minutes: 5-10 minutes    Tania Joyce was evaluated through a synchronous (real-time) audio encounter. Patient identification was verified at the start of the visit. He (or guardian if applicable) is aware that this is a billable service, which includes applicable co-pays. This visit was conducted with the patient's (and/or legal guardian's) verbal consent. He has not had a related appointment within my department in the past 7 days or scheduled within the next 24 hours. The patient was located at Home: [address] [place] [address] [address]  [place] [id number]. The provider was located at [facility] (Appt Dept): [address] [facility]  [address] [facility] [address] [place],  [address] [facility] [address]. Note: not billable if this call serves to triage the patient into an appointment for the relevant concern    Hortensia Kruger, RAUL - CNP

## 2022-12-12 ENCOUNTER — TELEPHONE (OUTPATIENT)
Dept: PHARMACY | Facility: CLINIC | Age: 57
End: 2022-12-12

## 2022-12-12 NOTE — LETTER
Dear Luz Sepulveda,    We tried to reach you recently regarding your Atorvastatin 20mg, but were unable to reach you on the telephone. If you are no longer taking or taking differently, please reply to this message, or call us at the number below so that we can discuss this and update your medication profile. It appears that this medication has not been filled at proper times. We are worried you might be missing doses or not taking it as directed. It is important that you take your medications regularly and try not to miss a single dose.     Some ways to help you remember to take and refill your medications are to:   Use a pill box, set an alarm, and/or keep your medication near something that you do every day   Fill a 3-month supply of your prescription at a time to save you time and trips to the pharmacy - if you would like assistance in switching your prescriptions to a 3-month supply, please contact us   Ask your pharmacy if they participate in [facility]", a program where you can  all of your medications on the same day   Ask your pharmacy if you can be set up with automatic refill, where they will automatically refill your prescription when it is due and let you know it's ready to     Sincerely,       [address] [place] [address] free: [phone]

## 2022-12-12 NOTE — TELEPHONE ENCOUNTER
POPULATION HEALTH CLINICAL PHARMACY: [facility]  Identified care gap per [employer]: fills at [facility]: Statin adherence    Last Visit: 12/9/222 Virtual      Patient found in [facility] and is currently eligible for TIP    ASSESSMENT  [id number] Records claims through 12/17/22  YTD Saulo Cabrera = Filled only once; Potential Fail Date: 12/17/22 ):   ATORVASTATIN TAB 20MG last filled on 8/23/22 for 90 day supply. Next refill due: 11/21/22    Per  [facility]:  ATORVASTATIN TAB 20MG last filled on 8/23/22 for 90 day supply. Per [facility]:   ATORVASTATIN TAB 20MG last picked up on 8/24/22 for 90 day supply. refills remaining. Billed through [facility]   Component Value Date    CHOL 168 04/11/2022    TRIG 310 (H) 04/11/2022    HDL 38 (L) 04/11/2022    LDLCALC see below 04/11/2022    LDLDIRECT 81 04/11/2022     ALT   Date Value Ref Range Status   08/24/2022 11 10 - 40 U/L Final     AST   Date Value Ref Range Status   08/24/2022 9 (L) 15 - 37 U/L Final     The 10-year ASCVD risk score (Gissell DK, et al., 2019) is: 23.7%    Values used to calculate the score:      Age: 61 years      Sex: Male      Is Non- : No      Diabetic: Yes      Tobacco smoker: Yes      Systolic Blood Pressure: 762 mmHg      Is BP treated: No      HDL Cholesterol: 38 mg/dL      Total Cholesterol: 168 mg/dL     PLAN  The following are interventions that have been identified:   - Patient overdue refilling atorvastatin and active on home medication list.     LVM for patient in regards to above medication. Told patient to return call    1st fill     Future Appointments   Date Time Provider Ryan Davenport   12/14/2022  2:30 PM BRIANNA Malin [facility] [place] [facility] [place]   8/28/2023  3:00 PM BRIANNA Malin [facility].   [address] [place] [address] free: [phone]

## 2022-12-14 DIAGNOSIS — F41.9 ANXIETY: ICD-10-CM

## 2022-12-14 NOTE — TELEPHONE ENCOUNTER
Refill Request - Controlled Substance    CONFIRM preferrred pharmacy with the patient. If Mail Order Rx - Pend for 90 day refill. Last Seen Department: 12/9/2022  Last Seen by PCP: 12/9/2022    Last Written: 11/15/2022, #60, 0 refills     Last UDS: 5/8/2020    Med Agreement Signed On: 5/8/2020    If no future appointment scheduled, route STAFF MESSAGE with patient name to the [facility] for scheduling. CONFIRM preferrred pharmacy with the patient. Next Appointment:   Future Appointments   Date Time Provider Ryan Davenport   12/22/2022  1:00 PM BRIANNA [facility] - [place]   8/28/2023  3:00 PM BRIANNA Lubin  [place] [facility] [place]       Message sent to [address] [place] [address] to schedule appt with patient?   NO      Requested Prescriptions     Pending Prescriptions Disp Refills    clonazePAM (KLONOPIN) 1 MG tablet [Pharmacy Med Name: clonazePAM 1 MG TABLET] 60 tablet 0     Sig: TAKE ONE TABLET BY MOUTH TWICE A DAY AS NEEDED FOR ANXIETY

## 2022-12-15 RX ORDER — CLONAZEPAM 1 MG/1
TABLET ORAL
Qty: 60 TABLET | Refills: 0 | Status: SHIPPED | OUTPATIENT
Start: 2022-12-15 | End: 2023-01-13

## 2022-12-15 NOTE — TELEPHONE ENCOUNTER
Please let patient know that if he cannot make his next appointment I will have to wean him off his klonopin. He needs regular follow up.      Thanks,   Ryan Rivers

## 2022-12-15 NOTE — TELEPHONE ENCOUNTER
2nd Attempt Documentation:  2nd attempt to contact this patient regarding the previous message  CLINICAL PHARMACY: [facility]  Patient unavailable at the time of call. Left following message on home TAD: please call back at toll-free [phone] to retrieve previous message. Letter mailed to patient.         For Pharmacy Admin Tracking Only    CPA in place:  No  Intervention Detail: Adherence Monitorin  Gap Closed?: No   Time Spent (min): 15

## 2022-12-19 NOTE — ED NOTES
Spoke with Jaqueline ALARCON at PCP officer. States patient had a virtual visit on 12/9 for nausea/vomiting/diarrhea. Dx with URI and patient declined covid/flu.       Nguyen Palmer RN  12/19/22 [id number] [facility] [address], RN  12/19/22 2157

## 2022-12-19 NOTE — ED PROVIDER NOTES
[address] ED      CHIEF COMPLAINT  No chief complaint on file. HISTORY OF PRESENT ILLNESS  Vargas Jackson is a 61 y.o. male  who presents to the ED complaining of cardiac arrest.  Patient was reportedly found down approximately 10 minutes after last being seen by somebody at home. Upon EMS arrival, was found to be unresponsive and in PEA. He received ACLS protocol for approximately 30 minutes, with 5 rounds of epinephrine given, as well as bicarb, remained in PEA until they eventually had return of a faint pulse and brought him to the ER. Upon arrival in the ER, patient is unresponsive and pulseless and ACLS protocol was resumed immediately. No other complaints, modifying factors or associated symptoms. I have reviewed the following from the nursing documentation.     Past Medical History:   Diagnosis Date    Anxiety     Chronic back pain     Depression     Depression, major, recurrent, severe with psychosis 5/4/2012    Hydronephrosis of right kidney 7/12/2015    Hypothyroidism 9/24/2015    Retroperitoneal fibrosis 2006    TMJ dysfunction     Wears hearing aid      Past Surgical History:   Procedure Laterality Date    APPENDECTOMY      INNER EAR SURGERY      bilaterally - at least 5-6 surgeries with each one     Family History   Problem Relation Age of Onset    Diabetes Mother     High Blood Pressure Mother     High Cholesterol Mother     Cancer Father         Kidney and thyroid    Cancer Maternal Grandmother     Diabetes Paternal Aunt     Cancer Maternal Grandfather 61        colon     Social History     Socioeconomic History    Marital status:      Spouse name: Not on file    Number of children: Not on file    Years of education: Not on file    Highest education level: Not on file   Occupational History    Not on file   Tobacco Use    Smoking status: Every Day     Packs/day: 1.00     Years: 30.00     Pack years: 30.00     Types: Cigarettes     Last attempt to quit: 4/1/2010 Years since quittin.7    Smokeless tobacco: Never    Tobacco comments:     quit for 5 years about 2012   Substance and Sexual Activity    Alcohol use: No    Drug use: No    Sexual activity: Not Currently   Other Topics Concern    Not on file   Social History Narrative    Not on file     Social Determinants of Health     Financial Resource Strain: High Risk    Difficulty of Paying Living Expenses: Very hard   Food Insecurity: Food Insecurity Present    Worried About Running Out of Food in the Last Year: Sometimes true    Ran Out of Food in the Last Year: Sometimes true   Transportation Needs: Unmet Transportation Needs    Lack of Transportation (Medical): Yes    Lack of Transportation (Non-Medical): Yes   Physical Activity: Inactive    Days of Exercise per Week: 1 day    Minutes of Exercise per Session: 0 min   Stress: Not on file   Social Connections: Not on file   Intimate Partner Violence: Not on file   Housing Stability: [address] to Pay for Housing in the Last Year: No    Number of [place] in the Last Year: 1    Unstable Housing in the Last Year: No     No current facility-administered medications for this encounter.      Current Outpatient Medications   Medication Sig Dispense Refill    clonazePAM (KLONOPIN) 1 MG tablet TAKE ONE TABLET BY MOUTH TWICE A DAY AS NEEDED FOR ANXIETY 60 tablet 0    fluticasone (FLONASE) 50 MCG/ACT nasal spray 2 sprays by Each Nostril route daily 16 g 0    guaiFENesin (MUCINEX) 600 MG extended release tablet Take 1 tablet by mouth 2 times daily for 15 days 30 tablet 0    tiZANidine (ZANAFLEX) 4 MG tablet TAKE ONE TABLET BY MOUTH EVERY 6 HOURS AS NEEDED FOR PAIN MUST LAST 30 DAYS 120 tablet 1    vitamin D3 (CHOLECALCIFEROL) 25 MCG (1000 UT) TABS tablet Take 1 tablet by mouth daily 90 tablet 1    levothyroxine (SYNTHROID) 100 MCG tablet Take 1 tablet by mouth daily 90 tablet 1    QUEtiapine (SEROQUEL) 400 MG tablet TAKE ONE TABLET BY MOUTH TWICE A  tablet 1 atorvastatin (LIPITOR) 20 MG tablet Take 1 tablet by mouth daily (Patient not taking: No sig reported) 90 tablet 1    OXcarbazepine (TRILEPTAL) 150 MG tablet Take 1 tab orally nightly (Patient not taking: No sig reported) 30 tablet 0    ergocalciferol (DRISDOL) 1.25 MG (16484 UT) capsule Take 1 capsule by mouth twice a week (for 6 months total) for Vitamin D deficiency. 8 capsule 5    naloxone 4 MG/0.1ML LIQD nasal spray 1 spray by Nasal route as needed for Opioid Reversal (Patient not taking: No sig reported) 1 each 5     Allergies   Allergen Reactions    Citalopram Hydrobromide Nausea Only    Cymbalta [Duloxetine Hcl] Other (See Comments)     headache    Darvocet [Propoxyphene N-Acetaminophen] Nausea Only    Escitalopram Oxalate Nausea Only    Lyrica [Pregabalin] Other (See Comments)     Depression suicidal    Morphine Other (See Comments)     Felt strange/\"loopy\"    Neurontin [Gabapentin] Nausea Only    Oxycodone     Protonix [Pantoprazole Sodium Sesquihydrate] Other (See Comments)     headache       REVIEW OF SYSTEMS  10 systems reviewed, pertinent positives per HPI otherwise noted to be negative. PHYSICAL EXAM  There were no vitals taken for this visit. GENERAL APPEARANCE: Unresponsive  HENT: Normocephalic. Atraumatic. Mucous membranes are moist.  NECK: Supple. EYES: Dilated and unresponsive  HEART/CHEST: Asystolic  LUNGS: Lungs clear bilaterally  ABDOMEN: Soft. Non-distended. MUSCULOSKELETAL: No extremity edema. Compartments soft. No deformity. SKIN: Warm and dry. No acute rashes. NEUROLOGICAL: Unresponsive  PSYCHIATRIC: Normal mood and affect. LABS  I have reviewed all labs for this visit. No results found for this visit on 12/19/22. RADIOLOGY  No orders to display     ED COURSE/MDM  Patient seen and evaluated. Old records reviewed. Labs and imaging reviewed    Patient is a 48-year-old male, presenting in cardiac arrest.  Full HPI as detailed above.   Upon arrival in the ED, patient arrived pulseless and unresponsive and ACLS protocol was reinitiated immediately. Found to be in PEA here on multiple rhythm checks. Received multiple doses of epinephrine as well as a dose of bicarb, I did perform a bedside ultrasound which revealed no evidence of cardiac activity whatsoever. After multiple rounds of ACLS, patient remained pulseless in PEA with no visible cardiac activity and time of death was called at . The total Critical Care time is 25 minutes which excludes separately billable procedures. I, Dr. Ashu Oneil MD, am the primary clinician of record. Is this patient to be included in the SEP-1 Core Measure? No   Exclusion criteria - the patient is NOT to be included for SEP-1 Core Measure due to: Infection is not suspected     During the patient's ED course, the patient was given:  Medications - No data to display     CLINICAL IMPRESSION  1. Cardiac arrest ([facility] [address]        There were no vitals taken for this visit. DISPOSITION  Pietro Guan was pronounced  at     Patient was given scripts for the following medications. I counseled patient how to take these medications. New Prescriptions    No medications on file       Follow-up with:  No follow-up provider specified. DISCLAIMER: This chart was created using Dragon dictation software. Efforts were made by me to ensure accuracy, however some errors may be present due to limitations of this technology and occasionally words are not transcribed correctly.        Ashu Oneil MD  22 [id number]

## 2022-12-19 NOTE — TELEPHONE ENCOUNTER
Nury Abraham [phone]    Stephany from [facility] ER called the office wanting to know if an MD in the office would sign the death certificate of the patient, patient had no cardiac history. Patient arrived to ED in PEA, received ACLS for 30 minutes. Time of death was called at . Please call Nury Abraham with any further questions. Thank you.

## 2022-12-19 NOTE — ED NOTES
Spoke with Nathan REED from [facility] concern would be that patient possibly overdosed. States patient was prescribed chronic pain medications. States she stopped prescribing pain medications because she was concerned that patient was taking his son's medication. States she will not be able to sign death certificate and MD at office would not be comfortable signing. Notified Ray Daryar at [facility]. Awaiting return call.       Holli Cuevas RN  12/19/22 2030

## 2022-12-20 NOTE — TELEPHONE ENCOUNTER
Sorry for the delayed response, did not see message yesterday. Likely he has left the hospital, but I will sign the death certificate.

## 2024-09-10 NOTE — TELEPHONE ENCOUNTER
Refill Request     Last Seen: Last Seen Department: 8/19/2021  Last Seen by PCP: 8/19/2021    Last Written: 8/19/2021    Next Appointment:   Future Appointments   Date Time Provider Ryan Davenport   10/7/2021  2:00 PM RAUL Munoz CNP [facility]   8/22/2022  2:00 PM BRIANNA Christian [place] [facility]       Future appointment scheduled      Requested Prescriptions     Pending Prescriptions Disp Refills    tiZANidine (Koby Medina) 4 MG tablet [Pharmacy Med Name: tiZANidine HCL 4MG TABLET] 180 tablet 0     Sig: TAKE ONE TABLET BY MOUTH EVERY 4 HOURS AS NEEDED FOR PAIN.  MUST LAST 30 DAYS <<--- Click to launch